# Patient Record
Sex: FEMALE | Race: WHITE | Employment: OTHER | URBAN - METROPOLITAN AREA
[De-identification: names, ages, dates, MRNs, and addresses within clinical notes are randomized per-mention and may not be internally consistent; named-entity substitution may affect disease eponyms.]

---

## 2017-02-12 ENCOUNTER — APPOINTMENT (OUTPATIENT)
Dept: CT IMAGING | Age: 82
DRG: 392 | End: 2017-02-12
Payer: COMMERCIAL

## 2017-02-12 ENCOUNTER — HOSPITAL ENCOUNTER (INPATIENT)
Age: 82
LOS: 1 days | Discharge: HOME HEALTH | DRG: 392 | End: 2017-02-16
Attending: EMERGENCY MEDICINE | Admitting: FAMILY MEDICINE
Payer: COMMERCIAL

## 2017-02-12 ENCOUNTER — APPOINTMENT (OUTPATIENT)
Dept: MRI IMAGING | Age: 82
DRG: 392 | End: 2017-02-12
Payer: COMMERCIAL

## 2017-02-12 DIAGNOSIS — G93.40 ENCEPHALOPATHY ACUTE: ICD-10-CM

## 2017-02-12 DIAGNOSIS — R44.1 HALLUCINATION, VISUAL: ICD-10-CM

## 2017-02-12 DIAGNOSIS — R10.84 GENERALIZED ABDOMINAL PAIN: Primary | ICD-10-CM

## 2017-02-12 LAB
-: NORMAL
ABSOLUTE EOS #: 0.1 K/UL (ref 0–0.4)
ABSOLUTE LYMPH #: 1.7 K/UL (ref 1–4.8)
ABSOLUTE MONO #: 0.6 K/UL (ref 0.1–1.3)
ALBUMIN SERPL-MCNC: 3.9 G/DL (ref 3.5–5.2)
ALBUMIN/GLOBULIN RATIO: ABNORMAL (ref 1–2.5)
ALP BLD-CCNC: 75 U/L (ref 35–104)
ALT SERPL-CCNC: 12 U/L (ref 5–33)
AMMONIA: 43 UMOL/L (ref 11–51)
AMORPHOUS: NORMAL
ANION GAP SERPL CALCULATED.3IONS-SCNC: 14 MMOL/L (ref 9–17)
AST SERPL-CCNC: 25 U/L
BACTERIA: NORMAL
BASOPHILS # BLD: 1 % (ref 0–2)
BASOPHILS ABSOLUTE: 0 K/UL (ref 0–0.2)
BILIRUB SERPL-MCNC: 0.56 MG/DL (ref 0.3–1.2)
BILIRUBIN URINE: NEGATIVE
BUN BLDV-MCNC: 12 MG/DL (ref 8–23)
BUN/CREAT BLD: ABNORMAL (ref 9–20)
CALCIUM SERPL-MCNC: 9.8 MG/DL (ref 8.6–10.4)
CASTS UA: NORMAL /LPF
CHLORIDE BLD-SCNC: 101 MMOL/L (ref 98–107)
CO2: 23 MMOL/L (ref 20–31)
COLOR: YELLOW
COMMENT UA: ABNORMAL
CREAT SERPL-MCNC: 0.7 MG/DL (ref 0.5–0.9)
CRYSTALS, UA: NORMAL /HPF
DIFFERENTIAL TYPE: ABNORMAL
EOSINOPHILS RELATIVE PERCENT: 2 % (ref 0–4)
EPITHELIAL CELLS UA: NORMAL /HPF
GFR AFRICAN AMERICAN: >60 ML/MIN
GFR NON-AFRICAN AMERICAN: >60 ML/MIN
GFR SERPL CREATININE-BSD FRML MDRD: ABNORMAL ML/MIN/{1.73_M2}
GFR SERPL CREATININE-BSD FRML MDRD: ABNORMAL ML/MIN/{1.73_M2}
GLUCOSE BLD-MCNC: 107 MG/DL (ref 70–99)
GLUCOSE URINE: NEGATIVE
HCT VFR BLD CALC: 42.7 % (ref 36–46)
HEMOGLOBIN: 14.6 G/DL (ref 12–16)
INR BLD: 2.4
KETONES, URINE: ABNORMAL
LACTIC ACID: 1.1 MMOL/L (ref 0.5–2.2)
LACTIC ACID: 1.3 MMOL/L (ref 0.5–2.2)
LEUKOCYTE ESTERASE, URINE: NEGATIVE
LIPASE: 15 U/L (ref 13–60)
LYMPHOCYTES # BLD: 34 % (ref 24–44)
MAGNESIUM: 2 MG/DL (ref 1.6–2.6)
MCH RBC QN AUTO: 31.4 PG (ref 26–34)
MCHC RBC AUTO-ENTMCNC: 34.2 G/DL (ref 31–37)
MCV RBC AUTO: 91.8 FL (ref 80–100)
MONOCYTES # BLD: 11 % (ref 1–7)
MUCUS: NORMAL
NITRITE, URINE: NEGATIVE
OTHER OBSERVATIONS UA: NORMAL
PDW BLD-RTO: 13 % (ref 11.5–14.9)
PH UA: 7 (ref 5–8)
PLATELET # BLD: 200 K/UL (ref 150–450)
PLATELET ESTIMATE: ABNORMAL
PMV BLD AUTO: 7.8 FL (ref 6–12)
POTASSIUM SERPL-SCNC: 4.3 MMOL/L (ref 3.7–5.3)
PROTEIN UA: ABNORMAL
PROTHROMBIN TIME: 26.1 SEC (ref 9.7–12)
RBC # BLD: 4.66 M/UL (ref 4–5.2)
RBC # BLD: ABNORMAL 10*6/UL
RBC UA: NORMAL /HPF
RENAL EPITHELIAL, UA: NORMAL /HPF
SEG NEUTROPHILS: 52 % (ref 36–66)
SEGMENTED NEUTROPHILS ABSOLUTE COUNT: 2.6 K/UL (ref 1.3–9.1)
SODIUM BLD-SCNC: 138 MMOL/L (ref 135–144)
SPECIFIC GRAVITY UA: 1.02 (ref 1–1.03)
TOTAL PROTEIN: 8.3 G/DL (ref 6.4–8.3)
TRICHOMONAS: NORMAL
TROPONIN INTERP: NORMAL
TROPONIN INTERP: NORMAL
TROPONIN T: <0.03 NG/ML
TROPONIN T: <0.03 NG/ML
TURBIDITY: CLEAR
URINE HGB: NEGATIVE
UROBILINOGEN, URINE: NORMAL
WBC # BLD: 5.1 K/UL (ref 3.5–11)
WBC # BLD: ABNORMAL 10*3/UL
WBC UA: NORMAL /HPF
YEAST: NORMAL

## 2017-02-12 PROCEDURE — 2500000003 HC RX 250 WO HCPCS: Performed by: EMERGENCY MEDICINE

## 2017-02-12 PROCEDURE — A9579 GAD-BASE MR CONTRAST NOS,1ML: HCPCS | Performed by: FAMILY MEDICINE

## 2017-02-12 PROCEDURE — 74177 CT ABD & PELVIS W/CONTRAST: CPT | Performed by: RADIOLOGY

## 2017-02-12 PROCEDURE — 2580000003 HC RX 258: Performed by: EMERGENCY MEDICINE

## 2017-02-12 PROCEDURE — 93005 ELECTROCARDIOGRAM TRACING: CPT

## 2017-02-12 PROCEDURE — 96372 THER/PROPH/DIAG INJ SC/IM: CPT

## 2017-02-12 PROCEDURE — 6360000002 HC RX W HCPCS: Performed by: FAMILY MEDICINE

## 2017-02-12 PROCEDURE — 85610 PROTHROMBIN TIME: CPT

## 2017-02-12 PROCEDURE — 83735 ASSAY OF MAGNESIUM: CPT

## 2017-02-12 PROCEDURE — 6360000004 HC RX CONTRAST MEDICATION: Performed by: EMERGENCY MEDICINE

## 2017-02-12 PROCEDURE — 96375 TX/PRO/DX INJ NEW DRUG ADDON: CPT

## 2017-02-12 PROCEDURE — 6360000002 HC RX W HCPCS: Performed by: EMERGENCY MEDICINE

## 2017-02-12 PROCEDURE — 6360000004 HC RX CONTRAST MEDICATION: Performed by: FAMILY MEDICINE

## 2017-02-12 PROCEDURE — 2580000003 HC RX 258: Performed by: FAMILY MEDICINE

## 2017-02-12 PROCEDURE — 74185 MRA ABD W OR W/O CNTRST: CPT | Performed by: RADIOLOGY

## 2017-02-12 PROCEDURE — S0028 INJECTION, FAMOTIDINE, 20 MG: HCPCS | Performed by: EMERGENCY MEDICINE

## 2017-02-12 PROCEDURE — G0378 HOSPITAL OBSERVATION PER HR: HCPCS

## 2017-02-12 PROCEDURE — 70450 CT HEAD/BRAIN W/O DYE: CPT | Performed by: RADIOLOGY

## 2017-02-12 PROCEDURE — 70450 CT HEAD/BRAIN W/O DYE: CPT

## 2017-02-12 PROCEDURE — 83605 ASSAY OF LACTIC ACID: CPT

## 2017-02-12 PROCEDURE — 74177 CT ABD & PELVIS W/CONTRAST: CPT

## 2017-02-12 PROCEDURE — 36415 COLL VENOUS BLD VENIPUNCTURE: CPT

## 2017-02-12 PROCEDURE — 80053 COMPREHEN METABOLIC PANEL: CPT

## 2017-02-12 PROCEDURE — 82140 ASSAY OF AMMONIA: CPT

## 2017-02-12 PROCEDURE — 84484 ASSAY OF TROPONIN QUANT: CPT

## 2017-02-12 PROCEDURE — 87086 URINE CULTURE/COLONY COUNT: CPT

## 2017-02-12 PROCEDURE — 96374 THER/PROPH/DIAG INJ IV PUSH: CPT

## 2017-02-12 PROCEDURE — 85025 COMPLETE CBC W/AUTO DIFF WBC: CPT

## 2017-02-12 PROCEDURE — C8900 MRA W/CONT, ABD: HCPCS

## 2017-02-12 PROCEDURE — 99285 EMERGENCY DEPT VISIT HI MDM: CPT

## 2017-02-12 PROCEDURE — 83690 ASSAY OF LIPASE: CPT

## 2017-02-12 PROCEDURE — 81001 URINALYSIS AUTO W/SCOPE: CPT

## 2017-02-12 PROCEDURE — 96376 TX/PRO/DX INJ SAME DRUG ADON: CPT

## 2017-02-12 RX ORDER — SODIUM CHLORIDE 0.9 % (FLUSH) 0.9 %
10 SYRINGE (ML) INJECTION EVERY 12 HOURS SCHEDULED
Status: DISCONTINUED | OUTPATIENT
Start: 2017-02-12 | End: 2017-02-16 | Stop reason: HOSPADM

## 2017-02-12 RX ORDER — ONDANSETRON 2 MG/ML
4 INJECTION INTRAMUSCULAR; INTRAVENOUS EVERY 4 HOURS PRN
Status: DISCONTINUED | OUTPATIENT
Start: 2017-02-12 | End: 2017-02-15

## 2017-02-12 RX ORDER — LORAZEPAM 2 MG/ML
0.5 INJECTION INTRAMUSCULAR ONCE
Status: COMPLETED | OUTPATIENT
Start: 2017-02-12 | End: 2017-02-12

## 2017-02-12 RX ORDER — 0.9 % SODIUM CHLORIDE 0.9 %
500 INTRAVENOUS SOLUTION INTRAVENOUS ONCE
Status: COMPLETED | OUTPATIENT
Start: 2017-02-12 | End: 2017-02-12

## 2017-02-12 RX ORDER — ONDANSETRON 2 MG/ML
8 INJECTION INTRAMUSCULAR; INTRAVENOUS ONCE
Status: COMPLETED | OUTPATIENT
Start: 2017-02-12 | End: 2017-02-12

## 2017-02-12 RX ORDER — DEXTROSE, SODIUM CHLORIDE, AND POTASSIUM CHLORIDE 5; .45; .15 G/100ML; G/100ML; G/100ML
INJECTION INTRAVENOUS CONTINUOUS
Status: DISCONTINUED | OUTPATIENT
Start: 2017-02-12 | End: 2017-02-16 | Stop reason: HOSPADM

## 2017-02-12 RX ORDER — HYDRALAZINE HYDROCHLORIDE 20 MG/ML
10 INJECTION INTRAMUSCULAR; INTRAVENOUS EVERY 6 HOURS PRN
Status: DISCONTINUED | OUTPATIENT
Start: 2017-02-12 | End: 2017-02-15

## 2017-02-12 RX ORDER — MORPHINE SULFATE 2 MG/ML
2 INJECTION, SOLUTION INTRAMUSCULAR; INTRAVENOUS
Status: DISCONTINUED | OUTPATIENT
Start: 2017-02-12 | End: 2017-02-15

## 2017-02-12 RX ORDER — SODIUM CHLORIDE 0.9 % (FLUSH) 0.9 %
10 SYRINGE (ML) INJECTION PRN
Status: DISCONTINUED | OUTPATIENT
Start: 2017-02-12 | End: 2017-02-15

## 2017-02-12 RX ORDER — ASCORBIC ACID 500 MG
500 TABLET ORAL DAILY
COMMUNITY
End: 2017-12-05 | Stop reason: SDUPTHER

## 2017-02-12 RX ORDER — LANOLIN ALCOHOL/MO/W.PET/CERES
325 CREAM (GRAM) TOPICAL
COMMUNITY
End: 2017-12-05 | Stop reason: SDUPTHER

## 2017-02-12 RX ORDER — LORAZEPAM 2 MG/ML
0.5 INJECTION INTRAMUSCULAR EVERY 8 HOURS PRN
Status: DISCONTINUED | OUTPATIENT
Start: 2017-02-12 | End: 2017-02-15

## 2017-02-12 RX ORDER — MORPHINE SULFATE 2 MG/ML
1 INJECTION, SOLUTION INTRAMUSCULAR; INTRAVENOUS
Status: DISCONTINUED | OUTPATIENT
Start: 2017-02-12 | End: 2017-02-15

## 2017-02-12 RX ADMIN — ONDANSETRON 8 MG: 2 INJECTION INTRAMUSCULAR; INTRAVENOUS at 15:33

## 2017-02-12 RX ADMIN — LORAZEPAM 0.5 MG: 2 INJECTION INTRAMUSCULAR; INTRAVENOUS at 17:56

## 2017-02-12 RX ADMIN — IOHEXOL 130 ML: 350 INJECTION, SOLUTION INTRAVENOUS at 16:26

## 2017-02-12 RX ADMIN — SODIUM CHLORIDE 500 ML: 9 INJECTION, SOLUTION INTRAVENOUS at 15:26

## 2017-02-12 RX ADMIN — GADOPENTETATE DIMEGLUMINE 40 ML: 469.01 INJECTION INTRAVENOUS at 21:12

## 2017-02-12 RX ADMIN — LORAZEPAM 0.5 MG: 2 INJECTION INTRAMUSCULAR; INTRAVENOUS at 21:02

## 2017-02-12 RX ADMIN — ENOXAPARIN SODIUM 40 MG: 40 INJECTION SUBCUTANEOUS at 21:11

## 2017-02-12 RX ADMIN — MORPHINE SULFATE 1 MG: 2 INJECTION, SOLUTION INTRAMUSCULAR; INTRAVENOUS at 21:02

## 2017-02-12 RX ADMIN — Medication 10 ML: at 21:04

## 2017-02-12 RX ADMIN — FAMOTIDINE 20 MG: 10 INJECTION, SOLUTION INTRAVENOUS at 15:36

## 2017-02-12 RX ADMIN — POTASSIUM CHLORIDE, DEXTROSE MONOHYDRATE AND SODIUM CHLORIDE: 150; 5; 450 INJECTION, SOLUTION INTRAVENOUS at 21:11

## 2017-02-12 ASSESSMENT — PAIN SCALES - GENERAL
PAINLEVEL_OUTOF10: 8
PAINLEVEL_OUTOF10: 10

## 2017-02-12 ASSESSMENT — ENCOUNTER SYMPTOMS
VOMITING: 0
BLOOD IN STOOL: 0
NAUSEA: 1
ANAL BLEEDING: 0
RECTAL PAIN: 0
ABDOMINAL PAIN: 1
ABDOMINAL DISTENTION: 0
CONSTIPATION: 1
DIARRHEA: 0

## 2017-02-12 ASSESSMENT — PAIN DESCRIPTION - LOCATION: LOCATION: ABDOMEN;HEAD;SHOULDER

## 2017-02-12 ASSESSMENT — PAIN DESCRIPTION - PAIN TYPE: TYPE: ACUTE PAIN

## 2017-02-13 PROBLEM — F03.90 DEMENTIA (HCC): Chronic | Status: ACTIVE | Noted: 2017-02-13

## 2017-02-13 PROBLEM — R10.84 GENERALIZED ABDOMINAL PAIN: Status: ACTIVE | Noted: 2017-02-13

## 2017-02-13 LAB
CULTURE: NORMAL
CULTURE: NORMAL
INR BLD: 1.9
Lab: NORMAL
PROTHROMBIN TIME: 20.7 SEC (ref 9.7–12)
SPECIMEN DESCRIPTION: NORMAL
SPECIMEN DESCRIPTION: NORMAL
STATUS: NORMAL

## 2017-02-13 PROCEDURE — 96376 TX/PRO/DX INJ SAME DRUG ADON: CPT

## 2017-02-13 PROCEDURE — 36415 COLL VENOUS BLD VENIPUNCTURE: CPT

## 2017-02-13 PROCEDURE — 2580000003 HC RX 258: Performed by: FAMILY MEDICINE

## 2017-02-13 PROCEDURE — 93880 EXTRACRANIAL BILAT STUDY: CPT

## 2017-02-13 PROCEDURE — 6370000000 HC RX 637 (ALT 250 FOR IP): Performed by: FAMILY MEDICINE

## 2017-02-13 PROCEDURE — 99223 1ST HOSP IP/OBS HIGH 75: CPT | Performed by: FAMILY MEDICINE

## 2017-02-13 PROCEDURE — 85610 PROTHROMBIN TIME: CPT

## 2017-02-13 PROCEDURE — 6360000002 HC RX W HCPCS: Performed by: FAMILY MEDICINE

## 2017-02-13 PROCEDURE — G0378 HOSPITAL OBSERVATION PER HR: HCPCS

## 2017-02-13 RX ORDER — DOXAZOSIN MESYLATE 1 MG/1
1 TABLET ORAL DAILY
Status: DISCONTINUED | OUTPATIENT
Start: 2017-02-13 | End: 2017-02-16 | Stop reason: HOSPADM

## 2017-02-13 RX ORDER — AMLODIPINE BESYLATE 5 MG/1
5 TABLET ORAL DAILY
Status: DISCONTINUED | OUTPATIENT
Start: 2017-02-13 | End: 2017-02-16 | Stop reason: HOSPADM

## 2017-02-13 RX ORDER — WARFARIN SODIUM 3 MG/1
3 TABLET ORAL
Status: DISCONTINUED | OUTPATIENT
Start: 2017-02-13 | End: 2017-02-16 | Stop reason: HOSPADM

## 2017-02-13 RX ORDER — ATENOLOL 50 MG/1
50 TABLET ORAL DAILY
Status: DISCONTINUED | OUTPATIENT
Start: 2017-02-13 | End: 2017-02-16 | Stop reason: HOSPADM

## 2017-02-13 RX ORDER — LEVOTHYROXINE SODIUM 0.1 MG/1
100 TABLET ORAL DAILY
Status: DISCONTINUED | OUTPATIENT
Start: 2017-02-13 | End: 2017-02-16 | Stop reason: HOSPADM

## 2017-02-13 RX ORDER — PANTOPRAZOLE SODIUM 40 MG/1
40 TABLET, DELAYED RELEASE ORAL
Status: DISCONTINUED | OUTPATIENT
Start: 2017-02-13 | End: 2017-02-16 | Stop reason: HOSPADM

## 2017-02-13 RX ORDER — TRAMADOL HYDROCHLORIDE 50 MG/1
50 TABLET ORAL EVERY 6 HOURS PRN
Status: DISCONTINUED | OUTPATIENT
Start: 2017-02-13 | End: 2017-02-16 | Stop reason: HOSPADM

## 2017-02-13 RX ORDER — FERROUS SULFATE 325(65) MG
325 TABLET ORAL
Status: DISCONTINUED | OUTPATIENT
Start: 2017-02-13 | End: 2017-02-16 | Stop reason: HOSPADM

## 2017-02-13 RX ORDER — LATANOPROST 50 UG/ML
1 SOLUTION/ DROPS OPHTHALMIC DAILY
Status: DISCONTINUED | OUTPATIENT
Start: 2017-02-13 | End: 2017-02-16 | Stop reason: HOSPADM

## 2017-02-13 RX ORDER — POTASSIUM CHLORIDE 750 MG/1
10 CAPSULE, EXTENDED RELEASE ORAL 2 TIMES DAILY
Status: DISCONTINUED | OUTPATIENT
Start: 2017-02-13 | End: 2017-02-16 | Stop reason: HOSPADM

## 2017-02-13 RX ORDER — WARFARIN SODIUM 3 MG/1
1.5 TABLET ORAL
Status: DISCONTINUED | OUTPATIENT
Start: 2017-02-14 | End: 2017-02-16 | Stop reason: HOSPADM

## 2017-02-13 RX ORDER — WARFARIN SODIUM 3 MG/1
1.5 TABLET ORAL DAILY
Status: DISCONTINUED | OUTPATIENT
Start: 2017-02-13 | End: 2017-02-13 | Stop reason: DRUGHIGH

## 2017-02-13 RX ORDER — DONEPEZIL HYDROCHLORIDE 5 MG/1
5 TABLET, FILM COATED ORAL NIGHTLY
Status: DISCONTINUED | OUTPATIENT
Start: 2017-02-13 | End: 2017-02-16 | Stop reason: HOSPADM

## 2017-02-13 RX ORDER — TIMOLOL MALEATE 5 MG/ML
1 SOLUTION/ DROPS OPHTHALMIC 2 TIMES DAILY
Status: DISCONTINUED | OUTPATIENT
Start: 2017-02-13 | End: 2017-02-16 | Stop reason: HOSPADM

## 2017-02-13 RX ADMIN — POTASSIUM CHLORIDE, DEXTROSE MONOHYDRATE AND SODIUM CHLORIDE: 150; 5; 450 INJECTION, SOLUTION INTRAVENOUS at 12:00

## 2017-02-13 RX ADMIN — POTASSIUM CHLORIDE 10 MEQ: 750 CAPSULE, EXTENDED RELEASE ORAL at 20:12

## 2017-02-13 RX ADMIN — LEVOTHYROXINE SODIUM 100 MCG: 100 TABLET ORAL at 11:56

## 2017-02-13 RX ADMIN — MORPHINE SULFATE 2 MG: 2 INJECTION, SOLUTION INTRAMUSCULAR; INTRAVENOUS at 16:47

## 2017-02-13 RX ADMIN — POTASSIUM CHLORIDE 10 MEQ: 750 CAPSULE, EXTENDED RELEASE ORAL at 09:00

## 2017-02-13 RX ADMIN — DOXAZOSIN 1 MG: 1 TABLET ORAL at 11:56

## 2017-02-13 RX ADMIN — TIMOLOL MALEATE 1 DROP: 5 SOLUTION OPHTHALMIC at 22:20

## 2017-02-13 RX ADMIN — LATANOPROST 1 DROP: 50 SOLUTION OPHTHALMIC at 11:56

## 2017-02-13 RX ADMIN — MORPHINE SULFATE 2 MG: 2 INJECTION, SOLUTION INTRAMUSCULAR; INTRAVENOUS at 01:26

## 2017-02-13 RX ADMIN — DONEPEZIL HYDROCHLORIDE 5 MG: 5 TABLET, FILM COATED ORAL at 20:12

## 2017-02-13 RX ADMIN — AMLODIPINE BESYLATE 5 MG: 5 TABLET ORAL at 09:01

## 2017-02-13 RX ADMIN — TIMOLOL MALEATE 1 DROP: 5 SOLUTION OPHTHALMIC at 11:56

## 2017-02-13 RX ADMIN — LATANOPROST 1 DROP: 50 SOLUTION OPHTHALMIC at 20:12

## 2017-02-13 RX ADMIN — MORPHINE SULFATE 2 MG: 2 INJECTION, SOLUTION INTRAMUSCULAR; INTRAVENOUS at 12:36

## 2017-02-13 RX ADMIN — ATENOLOL 50 MG: 50 TABLET ORAL at 09:00

## 2017-02-13 RX ADMIN — WARFARIN SODIUM 3 MG: 3 TABLET ORAL at 18:38

## 2017-02-13 ASSESSMENT — PAIN DESCRIPTION - DESCRIPTORS: DESCRIPTORS: DISCOMFORT;DULL

## 2017-02-13 ASSESSMENT — PAIN SCALES - GENERAL
PAINLEVEL_OUTOF10: 10
PAINLEVEL_OUTOF10: 6
PAINLEVEL_OUTOF10: 0
PAINLEVEL_OUTOF10: 8
PAINLEVEL_OUTOF10: 7

## 2017-02-13 ASSESSMENT — PAIN DESCRIPTION - PAIN TYPE: TYPE: ACUTE PAIN;CHRONIC PAIN

## 2017-02-13 ASSESSMENT — PAIN DESCRIPTION - LOCATION: LOCATION: ABDOMEN;NECK

## 2017-02-14 PROBLEM — F03.918 DEMENTIA WITH BEHAVIORAL DISTURBANCE: Chronic | Status: ACTIVE | Noted: 2017-02-13

## 2017-02-14 LAB
ANION GAP SERPL CALCULATED.3IONS-SCNC: 15 MMOL/L (ref 9–17)
BUN BLDV-MCNC: 8 MG/DL (ref 8–23)
BUN/CREAT BLD: ABNORMAL (ref 9–20)
CALCIUM SERPL-MCNC: 9.3 MG/DL (ref 8.6–10.4)
CHLORIDE BLD-SCNC: 102 MMOL/L (ref 98–107)
CO2: 21 MMOL/L (ref 20–31)
CREAT SERPL-MCNC: 0.86 MG/DL (ref 0.5–0.9)
GFR AFRICAN AMERICAN: >60 ML/MIN
GFR NON-AFRICAN AMERICAN: >60 ML/MIN
GFR SERPL CREATININE-BSD FRML MDRD: ABNORMAL ML/MIN/{1.73_M2}
GFR SERPL CREATININE-BSD FRML MDRD: ABNORMAL ML/MIN/{1.73_M2}
GLUCOSE BLD-MCNC: 131 MG/DL (ref 70–99)
INR BLD: 2.1
LV EF: 55 %
LVEF MODALITY: NORMAL
POTASSIUM SERPL-SCNC: 4.2 MMOL/L (ref 3.7–5.3)
PROTHROMBIN TIME: 23.1 SEC (ref 9.7–12)
SODIUM BLD-SCNC: 138 MMOL/L (ref 135–144)

## 2017-02-14 PROCEDURE — 99231 SBSQ HOSP IP/OBS SF/LOW 25: CPT | Performed by: FAMILY MEDICINE

## 2017-02-14 PROCEDURE — 2580000003 HC RX 258: Performed by: FAMILY MEDICINE

## 2017-02-14 PROCEDURE — 85610 PROTHROMBIN TIME: CPT

## 2017-02-14 PROCEDURE — 80048 BASIC METABOLIC PNL TOTAL CA: CPT

## 2017-02-14 PROCEDURE — G0378 HOSPITAL OBSERVATION PER HR: HCPCS

## 2017-02-14 PROCEDURE — 6370000000 HC RX 637 (ALT 250 FOR IP): Performed by: FAMILY MEDICINE

## 2017-02-14 PROCEDURE — 36415 COLL VENOUS BLD VENIPUNCTURE: CPT

## 2017-02-14 PROCEDURE — C8929 TTE W OR WO FOL WCON,DOPPLER: HCPCS

## 2017-02-14 PROCEDURE — 96375 TX/PRO/DX INJ NEW DRUG ADDON: CPT

## 2017-02-14 PROCEDURE — 96374 THER/PROPH/DIAG INJ IV PUSH: CPT

## 2017-02-14 RX ADMIN — AMLODIPINE BESYLATE 5 MG: 5 TABLET ORAL at 08:54

## 2017-02-14 RX ADMIN — DOXAZOSIN 1 MG: 1 TABLET ORAL at 08:55

## 2017-02-14 RX ADMIN — TRAMADOL HYDROCHLORIDE 50 MG: 50 TABLET, FILM COATED ORAL at 04:45

## 2017-02-14 RX ADMIN — DONEPEZIL HYDROCHLORIDE 5 MG: 5 TABLET, FILM COATED ORAL at 20:06

## 2017-02-14 RX ADMIN — LATANOPROST 1 DROP: 50 SOLUTION OPHTHALMIC at 08:55

## 2017-02-14 RX ADMIN — Medication 325 MG: at 08:54

## 2017-02-14 RX ADMIN — POTASSIUM CHLORIDE 10 MEQ: 750 CAPSULE, EXTENDED RELEASE ORAL at 20:06

## 2017-02-14 RX ADMIN — POTASSIUM CHLORIDE 10 MEQ: 750 CAPSULE, EXTENDED RELEASE ORAL at 08:54

## 2017-02-14 RX ADMIN — PANTOPRAZOLE SODIUM 40 MG: 40 TABLET, DELAYED RELEASE ORAL at 06:03

## 2017-02-14 RX ADMIN — WARFARIN SODIUM 1.5 MG: 3 TABLET ORAL at 18:51

## 2017-02-14 RX ADMIN — TIMOLOL MALEATE 1 DROP: 5 SOLUTION OPHTHALMIC at 20:06

## 2017-02-14 RX ADMIN — POTASSIUM CHLORIDE, DEXTROSE MONOHYDRATE AND SODIUM CHLORIDE: 150; 5; 450 INJECTION, SOLUTION INTRAVENOUS at 14:33

## 2017-02-14 RX ADMIN — TRAMADOL HYDROCHLORIDE 50 MG: 50 TABLET, FILM COATED ORAL at 10:46

## 2017-02-14 RX ADMIN — TIMOLOL MALEATE 1 DROP: 5 SOLUTION OPHTHALMIC at 08:55

## 2017-02-14 RX ADMIN — LEVOTHYROXINE SODIUM 100 MCG: 100 TABLET ORAL at 06:03

## 2017-02-14 ASSESSMENT — PAIN SCALES - GENERAL
PAINLEVEL_OUTOF10: 6
PAINLEVEL_OUTOF10: 8
PAINLEVEL_OUTOF10: 6
PAINLEVEL_OUTOF10: 1

## 2017-02-15 LAB
ANION GAP SERPL CALCULATED.3IONS-SCNC: 14 MMOL/L (ref 9–17)
BUN BLDV-MCNC: 8 MG/DL (ref 8–23)
BUN/CREAT BLD: ABNORMAL (ref 9–20)
CALCIUM SERPL-MCNC: 9.6 MG/DL (ref 8.6–10.4)
CHLORIDE BLD-SCNC: 104 MMOL/L (ref 98–107)
CO2: 23 MMOL/L (ref 20–31)
CREAT SERPL-MCNC: 0.85 MG/DL (ref 0.5–0.9)
GFR AFRICAN AMERICAN: >60 ML/MIN
GFR NON-AFRICAN AMERICAN: >60 ML/MIN
GFR SERPL CREATININE-BSD FRML MDRD: ABNORMAL ML/MIN/{1.73_M2}
GFR SERPL CREATININE-BSD FRML MDRD: ABNORMAL ML/MIN/{1.73_M2}
GLUCOSE BLD-MCNC: 121 MG/DL (ref 70–99)
INR BLD: 2.6
POTASSIUM SERPL-SCNC: 4.7 MMOL/L (ref 3.7–5.3)
PROTHROMBIN TIME: 28.3 SEC (ref 9.7–12)
SODIUM BLD-SCNC: 141 MMOL/L (ref 135–144)

## 2017-02-15 PROCEDURE — 1200000000 HC SEMI PRIVATE

## 2017-02-15 PROCEDURE — G0009 ADMIN PNEUMOCOCCAL VACCINE: HCPCS | Performed by: FAMILY MEDICINE

## 2017-02-15 PROCEDURE — 36415 COLL VENOUS BLD VENIPUNCTURE: CPT

## 2017-02-15 PROCEDURE — 6360000002 HC RX W HCPCS: Performed by: FAMILY MEDICINE

## 2017-02-15 PROCEDURE — 2580000003 HC RX 258: Performed by: FAMILY MEDICINE

## 2017-02-15 PROCEDURE — 6370000000 HC RX 637 (ALT 250 FOR IP): Performed by: FAMILY MEDICINE

## 2017-02-15 PROCEDURE — 90670 PCV13 VACCINE IM: CPT | Performed by: FAMILY MEDICINE

## 2017-02-15 PROCEDURE — 85610 PROTHROMBIN TIME: CPT

## 2017-02-15 PROCEDURE — 99231 SBSQ HOSP IP/OBS SF/LOW 25: CPT | Performed by: FAMILY MEDICINE

## 2017-02-15 PROCEDURE — 80048 BASIC METABOLIC PNL TOTAL CA: CPT

## 2017-02-15 PROCEDURE — 99222 1ST HOSP IP/OBS MODERATE 55: CPT | Performed by: INTERNAL MEDICINE

## 2017-02-15 RX ORDER — BENZONATATE 100 MG/1
100 CAPSULE ORAL 3 TIMES DAILY PRN
Status: DISCONTINUED | OUTPATIENT
Start: 2017-02-15 | End: 2017-02-16 | Stop reason: HOSPADM

## 2017-02-15 RX ORDER — LORAZEPAM 0.5 MG/1
0.5 TABLET ORAL ONCE
Status: DISCONTINUED | OUTPATIENT
Start: 2017-02-15 | End: 2017-02-16 | Stop reason: HOSPADM

## 2017-02-15 RX ORDER — ONDANSETRON 4 MG/1
4 TABLET, FILM COATED ORAL EVERY 8 HOURS PRN
Status: DISCONTINUED | OUTPATIENT
Start: 2017-02-15 | End: 2017-02-16 | Stop reason: HOSPADM

## 2017-02-15 RX ADMIN — BENZONATATE 100 MG: 100 CAPSULE ORAL at 17:37

## 2017-02-15 RX ADMIN — WARFARIN SODIUM 3 MG: 3 TABLET ORAL at 17:10

## 2017-02-15 RX ADMIN — PANTOPRAZOLE SODIUM 40 MG: 40 TABLET, DELAYED RELEASE ORAL at 05:50

## 2017-02-15 RX ADMIN — Medication 325 MG: at 08:29

## 2017-02-15 RX ADMIN — TIMOLOL MALEATE 1 DROP: 5 SOLUTION OPHTHALMIC at 08:31

## 2017-02-15 RX ADMIN — LATANOPROST 1 DROP: 50 SOLUTION OPHTHALMIC at 08:30

## 2017-02-15 RX ADMIN — ATENOLOL 50 MG: 50 TABLET ORAL at 08:29

## 2017-02-15 RX ADMIN — DOXAZOSIN 1 MG: 1 TABLET ORAL at 08:30

## 2017-02-15 RX ADMIN — TRAMADOL HYDROCHLORIDE 50 MG: 50 TABLET, FILM COATED ORAL at 19:49

## 2017-02-15 RX ADMIN — PNEUMOCOCCAL 13-VALENT CONJUGATE VACCINE 0.5 ML: 2.2; 2.2; 2.2; 2.2; 2.2; 4.4; 2.2; 2.2; 2.2; 2.2; 2.2; 2.2; 2.2 INJECTION, SUSPENSION INTRAMUSCULAR at 12:51

## 2017-02-15 RX ADMIN — LEVOTHYROXINE SODIUM 100 MCG: 100 TABLET ORAL at 05:49

## 2017-02-15 RX ADMIN — TIMOLOL MALEATE 1 DROP: 5 SOLUTION OPHTHALMIC at 21:41

## 2017-02-15 RX ADMIN — DONEPEZIL HYDROCHLORIDE 5 MG: 5 TABLET, FILM COATED ORAL at 21:41

## 2017-02-15 RX ADMIN — POTASSIUM CHLORIDE 10 MEQ: 750 CAPSULE, EXTENDED RELEASE ORAL at 21:41

## 2017-02-15 RX ADMIN — AMLODIPINE BESYLATE 5 MG: 5 TABLET ORAL at 08:29

## 2017-02-15 RX ADMIN — POTASSIUM CHLORIDE 10 MEQ: 750 CAPSULE, EXTENDED RELEASE ORAL at 08:29

## 2017-02-15 RX ADMIN — POTASSIUM CHLORIDE, DEXTROSE MONOHYDRATE AND SODIUM CHLORIDE: 150; 5; 450 INJECTION, SOLUTION INTRAVENOUS at 09:43

## 2017-02-15 ASSESSMENT — PAIN SCALES - GENERAL
PAINLEVEL_OUTOF10: 5
PAINLEVEL_OUTOF10: 3

## 2017-02-16 VITALS
HEART RATE: 57 BPM | TEMPERATURE: 97.3 F | WEIGHT: 141.31 LBS | RESPIRATION RATE: 16 BRPM | OXYGEN SATURATION: 94 % | DIASTOLIC BLOOD PRESSURE: 50 MMHG | HEIGHT: 59 IN | BODY MASS INDEX: 28.49 KG/M2 | SYSTOLIC BLOOD PRESSURE: 113 MMHG

## 2017-02-16 LAB
ANION GAP SERPL CALCULATED.3IONS-SCNC: 13 MMOL/L (ref 9–17)
BUN BLDV-MCNC: 9 MG/DL (ref 8–23)
BUN/CREAT BLD: ABNORMAL (ref 9–20)
CALCIUM SERPL-MCNC: 9.5 MG/DL (ref 8.6–10.4)
CHLORIDE BLD-SCNC: 103 MMOL/L (ref 98–107)
CO2: 24 MMOL/L (ref 20–31)
CREAT SERPL-MCNC: 0.9 MG/DL (ref 0.5–0.9)
GFR AFRICAN AMERICAN: >60 ML/MIN
GFR NON-AFRICAN AMERICAN: 60 ML/MIN
GFR SERPL CREATININE-BSD FRML MDRD: ABNORMAL ML/MIN/{1.73_M2}
GFR SERPL CREATININE-BSD FRML MDRD: ABNORMAL ML/MIN/{1.73_M2}
GLUCOSE BLD-MCNC: 97 MG/DL (ref 70–99)
INR BLD: 3
POTASSIUM SERPL-SCNC: 4.5 MMOL/L (ref 3.7–5.3)
PROTHROMBIN TIME: 33.3 SEC (ref 9.7–12)
SODIUM BLD-SCNC: 140 MMOL/L (ref 135–144)

## 2017-02-16 PROCEDURE — 99238 HOSP IP/OBS DSCHRG MGMT 30/<: CPT | Performed by: FAMILY MEDICINE

## 2017-02-16 PROCEDURE — 36415 COLL VENOUS BLD VENIPUNCTURE: CPT

## 2017-02-16 PROCEDURE — 85610 PROTHROMBIN TIME: CPT

## 2017-02-16 PROCEDURE — 99232 SBSQ HOSP IP/OBS MODERATE 35: CPT | Performed by: INTERNAL MEDICINE

## 2017-02-16 PROCEDURE — 80048 BASIC METABOLIC PNL TOTAL CA: CPT

## 2017-02-16 PROCEDURE — 6370000000 HC RX 637 (ALT 250 FOR IP): Performed by: FAMILY MEDICINE

## 2017-02-16 RX ORDER — MIRTAZAPINE 15 MG/1
15 TABLET, FILM COATED ORAL NIGHTLY
Qty: 30 TABLET | Refills: 11 | Status: SHIPPED | OUTPATIENT
Start: 2017-02-16 | End: 2017-12-05 | Stop reason: SDUPTHER

## 2017-02-16 RX ORDER — DONEPEZIL HYDROCHLORIDE 5 MG/1
5 TABLET, FILM COATED ORAL NIGHTLY
Qty: 30 TABLET | Refills: 1 | Status: SHIPPED | OUTPATIENT
Start: 2017-02-16 | End: 2017-04-12 | Stop reason: SDUPTHER

## 2017-02-16 RX ADMIN — DOXAZOSIN 1 MG: 1 TABLET ORAL at 08:28

## 2017-02-16 RX ADMIN — LATANOPROST 1 DROP: 50 SOLUTION OPHTHALMIC at 08:28

## 2017-02-16 RX ADMIN — TIMOLOL MALEATE 1 DROP: 5 SOLUTION OPHTHALMIC at 08:28

## 2017-02-16 RX ADMIN — PANTOPRAZOLE SODIUM 40 MG: 40 TABLET, DELAYED RELEASE ORAL at 06:26

## 2017-02-16 RX ADMIN — POTASSIUM CHLORIDE 10 MEQ: 750 CAPSULE, EXTENDED RELEASE ORAL at 08:28

## 2017-02-16 RX ADMIN — Medication 325 MG: at 08:28

## 2017-02-16 RX ADMIN — ATENOLOL 50 MG: 50 TABLET ORAL at 08:28

## 2017-02-16 RX ADMIN — TRAMADOL HYDROCHLORIDE 50 MG: 50 TABLET, FILM COATED ORAL at 13:04

## 2017-02-16 RX ADMIN — AMLODIPINE BESYLATE 5 MG: 5 TABLET ORAL at 08:28

## 2017-02-16 RX ADMIN — LEVOTHYROXINE SODIUM 100 MCG: 100 TABLET ORAL at 06:26

## 2017-02-16 ASSESSMENT — PAIN SCALES - GENERAL: PAINLEVEL_OUTOF10: 0

## 2017-02-17 LAB
EKG ATRIAL RATE: 59 BPM
EKG P AXIS: -2 DEGREES
EKG P-R INTERVAL: 156 MS
EKG Q-T INTERVAL: 390 MS
EKG QRS DURATION: 74 MS
EKG QTC CALCULATION (BAZETT): 386 MS
EKG R AXIS: -13 DEGREES
EKG T AXIS: 26 DEGREES
EKG VENTRICULAR RATE: 59 BPM

## 2017-02-24 ENCOUNTER — HOSPITAL ENCOUNTER (OUTPATIENT)
Dept: PHARMACY | Age: 82
Setting detail: THERAPIES SERIES
Discharge: HOME | End: 2017-02-24
Payer: COMMERCIAL

## 2017-02-24 DIAGNOSIS — I48.20 CHRONIC ATRIAL FIBRILLATION (HCC): ICD-10-CM

## 2017-02-24 LAB
INR BLD: 2.9
PROTIME: 34.7 SECONDS

## 2017-02-24 PROCEDURE — 85610 PROTHROMBIN TIME: CPT

## 2017-02-24 PROCEDURE — G0463 HOSPITAL OUTPT CLINIC VISIT: HCPCS

## 2017-03-10 ENCOUNTER — HOSPITAL ENCOUNTER (OUTPATIENT)
Dept: PHARMACY | Age: 82
Setting detail: THERAPIES SERIES
Discharge: HOME | End: 2017-03-10
Payer: COMMERCIAL

## 2017-03-10 DIAGNOSIS — I48.20 CHRONIC ATRIAL FIBRILLATION (HCC): ICD-10-CM

## 2017-03-10 LAB
INR BLD: 2.5
PROTIME: 30.3 SECONDS

## 2017-03-10 PROCEDURE — G0463 HOSPITAL OUTPT CLINIC VISIT: HCPCS

## 2017-03-10 PROCEDURE — 85610 PROTHROMBIN TIME: CPT

## 2017-04-07 ENCOUNTER — HOSPITAL ENCOUNTER (OUTPATIENT)
Dept: PHARMACY | Age: 82
Setting detail: THERAPIES SERIES
Discharge: HOME | End: 2017-04-07
Payer: COMMERCIAL

## 2017-04-07 DIAGNOSIS — I48.20 CHRONIC ATRIAL FIBRILLATION (HCC): ICD-10-CM

## 2017-04-07 LAB
INR BLD: 2.1
PROTIME: 25.7 SECONDS

## 2017-04-07 PROCEDURE — G0463 HOSPITAL OUTPT CLINIC VISIT: HCPCS

## 2017-04-07 PROCEDURE — 85610 PROTHROMBIN TIME: CPT

## 2017-05-05 ENCOUNTER — HOSPITAL ENCOUNTER (OUTPATIENT)
Dept: PHARMACY | Age: 82
Setting detail: THERAPIES SERIES
Discharge: HOME | End: 2017-05-05
Payer: COMMERCIAL

## 2017-05-05 DIAGNOSIS — I48.20 CHRONIC ATRIAL FIBRILLATION (HCC): ICD-10-CM

## 2017-05-05 LAB
INR BLD: 2
PROTIME: 23.6 SECONDS

## 2017-05-05 PROCEDURE — 85610 PROTHROMBIN TIME: CPT

## 2017-05-05 PROCEDURE — 99211 OFF/OP EST MAY X REQ PHY/QHP: CPT

## 2017-05-05 PROCEDURE — G0463 HOSPITAL OUTPT CLINIC VISIT: HCPCS

## 2017-06-02 ENCOUNTER — HOSPITAL ENCOUNTER (OUTPATIENT)
Dept: PHARMACY | Age: 82
Setting detail: THERAPIES SERIES
Discharge: HOME | End: 2017-06-02
Payer: COMMERCIAL

## 2017-06-02 LAB
INR BLD: 2.1
PROTIME: 25.2 SECONDS

## 2017-06-02 PROCEDURE — 99211 OFF/OP EST MAY X REQ PHY/QHP: CPT

## 2017-06-02 PROCEDURE — 85610 PROTHROMBIN TIME: CPT

## 2017-06-02 PROCEDURE — G0463 HOSPITAL OUTPT CLINIC VISIT: HCPCS

## 2017-06-30 ENCOUNTER — HOSPITAL ENCOUNTER (OUTPATIENT)
Dept: PHARMACY | Age: 82
Setting detail: THERAPIES SERIES
Discharge: HOME | End: 2017-06-30
Payer: COMMERCIAL

## 2017-06-30 DIAGNOSIS — I48.20 CHRONIC ATRIAL FIBRILLATION (HCC): ICD-10-CM

## 2017-06-30 LAB
INR BLD: 2.2
PROTIME: 27 SECONDS

## 2017-06-30 PROCEDURE — 85610 PROTHROMBIN TIME: CPT

## 2017-06-30 PROCEDURE — G0463 HOSPITAL OUTPT CLINIC VISIT: HCPCS

## 2017-06-30 PROCEDURE — 99211 OFF/OP EST MAY X REQ PHY/QHP: CPT

## 2017-07-28 ENCOUNTER — HOSPITAL ENCOUNTER (OUTPATIENT)
Dept: PHARMACY | Age: 82
Setting detail: THERAPIES SERIES
Discharge: HOME | End: 2017-07-28
Payer: COMMERCIAL

## 2017-07-28 DIAGNOSIS — I48.20 CHRONIC ATRIAL FIBRILLATION (HCC): ICD-10-CM

## 2017-07-28 LAB
INR BLD: 2.4
PROTIME: 29.3 SECONDS

## 2017-07-28 PROCEDURE — G0463 HOSPITAL OUTPT CLINIC VISIT: HCPCS

## 2017-07-28 PROCEDURE — 85610 PROTHROMBIN TIME: CPT

## 2017-07-28 PROCEDURE — 99211 OFF/OP EST MAY X REQ PHY/QHP: CPT

## 2017-08-25 ENCOUNTER — HOSPITAL ENCOUNTER (OUTPATIENT)
Dept: PHARMACY | Age: 82
Setting detail: THERAPIES SERIES
Discharge: HOME | End: 2017-08-25
Payer: COMMERCIAL

## 2017-08-25 DIAGNOSIS — I48.20 CHRONIC ATRIAL FIBRILLATION (HCC): ICD-10-CM

## 2017-08-25 LAB
INR BLD: 1.9
PROTIME: 23.2 SECONDS

## 2017-08-25 PROCEDURE — 85610 PROTHROMBIN TIME: CPT

## 2017-08-25 PROCEDURE — 99211 OFF/OP EST MAY X REQ PHY/QHP: CPT

## 2017-09-08 ENCOUNTER — HOSPITAL ENCOUNTER (OUTPATIENT)
Dept: PHARMACY | Age: 82
Setting detail: THERAPIES SERIES
Discharge: HOME | End: 2017-09-08
Payer: COMMERCIAL

## 2017-09-08 DIAGNOSIS — I48.20 CHRONIC ATRIAL FIBRILLATION (HCC): ICD-10-CM

## 2017-09-08 LAB
INR BLD: 2.6
PROTIME: 31.4 SECONDS

## 2017-09-08 PROCEDURE — 85610 PROTHROMBIN TIME: CPT

## 2017-09-08 PROCEDURE — 99211 OFF/OP EST MAY X REQ PHY/QHP: CPT

## 2017-09-29 ENCOUNTER — TELEPHONE (OUTPATIENT)
Dept: PHARMACY | Age: 82
End: 2017-09-29

## 2017-10-06 ENCOUNTER — HOSPITAL ENCOUNTER (OUTPATIENT)
Dept: PHARMACY | Age: 82
Setting detail: THERAPIES SERIES
Discharge: HOME | End: 2017-10-06
Payer: COMMERCIAL

## 2017-10-06 DIAGNOSIS — I48.20 CHRONIC ATRIAL FIBRILLATION (HCC): ICD-10-CM

## 2017-10-13 ENCOUNTER — HOSPITAL ENCOUNTER (OUTPATIENT)
Dept: PHARMACY | Age: 82
Setting detail: THERAPIES SERIES
Discharge: HOME | End: 2017-10-13
Payer: COMMERCIAL

## 2017-10-13 DIAGNOSIS — I48.20 CHRONIC ATRIAL FIBRILLATION (HCC): ICD-10-CM

## 2017-10-13 LAB
INR BLD: 2
PROTIME: 23.5 SECONDS

## 2017-10-13 PROCEDURE — 85610 PROTHROMBIN TIME: CPT

## 2017-10-13 PROCEDURE — 99211 OFF/OP EST MAY X REQ PHY/QHP: CPT

## 2017-10-13 NOTE — PROGRESS NOTES
Patient states compliant with regimen. No bleeding or thromboembolic side effects noted. No significant med or dietary changes. No significant recent illness or disease state changes. PT/INR done in office per protocol. INR today is 2. Patient understands dosing directions and information discussed. Dosing card given to patient. Progress note faxed to office. INR therapeutic at 2.  Goal 2-3  Continue warfarin 3 mg MWF and 1.5 mg 4x weekly  Recheck INR in four weeks

## 2017-11-10 ENCOUNTER — APPOINTMENT (OUTPATIENT)
Dept: PHARMACY | Age: 82
End: 2017-11-10
Payer: COMMERCIAL

## 2017-11-17 ENCOUNTER — HOSPITAL ENCOUNTER (OUTPATIENT)
Dept: PHARMACY | Age: 82
Setting detail: THERAPIES SERIES
Discharge: HOME | End: 2017-11-17
Payer: COMMERCIAL

## 2017-11-17 DIAGNOSIS — I48.20 CHRONIC ATRIAL FIBRILLATION (HCC): ICD-10-CM

## 2017-11-17 LAB
INR BLD: 1.5
PROTIME: 18 SECONDS

## 2017-11-17 PROCEDURE — 99211 OFF/OP EST MAY X REQ PHY/QHP: CPT

## 2017-11-17 PROCEDURE — 85610 PROTHROMBIN TIME: CPT

## 2017-12-01 ENCOUNTER — HOSPITAL ENCOUNTER (OUTPATIENT)
Dept: PHARMACY | Age: 82
Setting detail: THERAPIES SERIES
Discharge: HOME | End: 2017-12-01
Payer: COMMERCIAL

## 2017-12-01 DIAGNOSIS — I48.20 CHRONIC ATRIAL FIBRILLATION (HCC): ICD-10-CM

## 2017-12-01 LAB
INR BLD: 1.7
PROTIME: 20.7 SECONDS

## 2017-12-01 PROCEDURE — 85610 PROTHROMBIN TIME: CPT

## 2017-12-01 PROCEDURE — 99211 OFF/OP EST MAY X REQ PHY/QHP: CPT

## 2017-12-15 ENCOUNTER — HOSPITAL ENCOUNTER (OUTPATIENT)
Dept: PHARMACY | Age: 82
Setting detail: THERAPIES SERIES
Discharge: HOME | End: 2017-12-15
Payer: COMMERCIAL

## 2017-12-15 DIAGNOSIS — I48.20 CHRONIC ATRIAL FIBRILLATION (HCC): ICD-10-CM

## 2017-12-15 LAB
INR BLD: 2.2
PROTIME: 26.8 SECONDS

## 2017-12-15 PROCEDURE — 99211 OFF/OP EST MAY X REQ PHY/QHP: CPT

## 2017-12-15 PROCEDURE — 85610 PROTHROMBIN TIME: CPT

## 2018-01-12 ENCOUNTER — HOSPITAL ENCOUNTER (OUTPATIENT)
Dept: PHARMACY | Age: 83
Discharge: HOME | End: 2018-01-12

## 2018-01-19 ENCOUNTER — HOSPITAL ENCOUNTER (OUTPATIENT)
Dept: PHARMACY | Age: 83
Setting detail: THERAPIES SERIES
Discharge: HOME | End: 2018-01-19
Payer: COMMERCIAL

## 2018-01-19 DIAGNOSIS — I48.20 CHRONIC ATRIAL FIBRILLATION (HCC): ICD-10-CM

## 2018-01-19 LAB
INR BLD: 1.7
PROTIME: 20.9 SECONDS

## 2018-01-19 PROCEDURE — 85610 PROTHROMBIN TIME: CPT

## 2018-01-19 PROCEDURE — 99211 OFF/OP EST MAY X REQ PHY/QHP: CPT

## 2018-02-02 ENCOUNTER — HOSPITAL ENCOUNTER (OUTPATIENT)
Dept: PHARMACY | Age: 83
Setting detail: THERAPIES SERIES
Discharge: HOME | End: 2018-02-02
Payer: COMMERCIAL

## 2018-02-02 LAB
INR BLD: 1.7
PROTIME: 20.1 SECONDS

## 2018-02-02 PROCEDURE — 99211 OFF/OP EST MAY X REQ PHY/QHP: CPT

## 2018-02-02 PROCEDURE — 85610 PROTHROMBIN TIME: CPT

## 2018-02-16 ENCOUNTER — HOSPITAL ENCOUNTER (OUTPATIENT)
Dept: PHARMACY | Age: 83
Setting detail: THERAPIES SERIES
Discharge: HOME | End: 2018-02-16
Payer: COMMERCIAL

## 2018-02-16 DIAGNOSIS — I48.20 CHRONIC ATRIAL FIBRILLATION (HCC): ICD-10-CM

## 2018-02-16 LAB
INR BLD: 2.6
PROTIME: 31.5 SECONDS

## 2018-02-16 PROCEDURE — 99211 OFF/OP EST MAY X REQ PHY/QHP: CPT

## 2018-02-16 PROCEDURE — 85610 PROTHROMBIN TIME: CPT

## 2018-03-16 ENCOUNTER — HOSPITAL ENCOUNTER (OUTPATIENT)
Dept: PHARMACY | Age: 83
Setting detail: THERAPIES SERIES
Discharge: HOME | End: 2018-03-16
Payer: COMMERCIAL

## 2018-03-16 DIAGNOSIS — I48.20 CHRONIC ATRIAL FIBRILLATION (HCC): ICD-10-CM

## 2018-03-16 LAB
INR BLD: 1.6
PROTIME: 19 SECONDS

## 2018-03-16 PROCEDURE — 99211 OFF/OP EST MAY X REQ PHY/QHP: CPT

## 2018-03-16 PROCEDURE — 85610 PROTHROMBIN TIME: CPT

## 2018-04-02 ENCOUNTER — HOSPITAL ENCOUNTER (OUTPATIENT)
Dept: PHARMACY | Age: 83
Setting detail: THERAPIES SERIES
Discharge: HOME | End: 2018-04-02
Payer: COMMERCIAL

## 2018-04-02 ENCOUNTER — TELEPHONE (OUTPATIENT)
Dept: PHARMACY | Age: 83
End: 2018-04-02

## 2018-04-06 ENCOUNTER — HOSPITAL ENCOUNTER (OUTPATIENT)
Dept: PHARMACY | Age: 83
Setting detail: THERAPIES SERIES
Discharge: HOME | End: 2018-04-06
Payer: COMMERCIAL

## 2018-04-06 DIAGNOSIS — I48.20 CHRONIC ATRIAL FIBRILLATION (HCC): ICD-10-CM

## 2018-04-06 LAB
INR BLD: 2
PROTIME: 23.6 SECONDS

## 2018-04-06 PROCEDURE — 85610 PROTHROMBIN TIME: CPT

## 2018-04-06 PROCEDURE — 99211 OFF/OP EST MAY X REQ PHY/QHP: CPT

## 2018-05-04 ENCOUNTER — HOSPITAL ENCOUNTER (OUTPATIENT)
Dept: PHARMACY | Age: 83
Setting detail: THERAPIES SERIES
Discharge: HOME | End: 2018-05-04
Payer: COMMERCIAL

## 2018-05-04 DIAGNOSIS — I48.20 CHRONIC ATRIAL FIBRILLATION (HCC): ICD-10-CM

## 2018-05-04 LAB
INR BLD: 1.8
PROTIME: 21.1 SECONDS

## 2018-05-04 PROCEDURE — 85610 PROTHROMBIN TIME: CPT

## 2018-05-04 PROCEDURE — 99211 OFF/OP EST MAY X REQ PHY/QHP: CPT

## 2018-05-18 ENCOUNTER — HOSPITAL ENCOUNTER (OUTPATIENT)
Dept: PHARMACY | Age: 83
Setting detail: THERAPIES SERIES
Discharge: HOME | End: 2018-05-18
Payer: COMMERCIAL

## 2018-05-18 LAB
INR BLD: 2
PROTIME: 24 SECONDS

## 2018-05-18 PROCEDURE — 99211 OFF/OP EST MAY X REQ PHY/QHP: CPT

## 2018-05-18 PROCEDURE — 85610 PROTHROMBIN TIME: CPT

## 2018-06-15 ENCOUNTER — TELEPHONE (OUTPATIENT)
Dept: PHARMACY | Age: 83
End: 2018-06-15

## 2018-06-22 ENCOUNTER — HOSPITAL ENCOUNTER (OUTPATIENT)
Dept: PHARMACY | Age: 83
Setting detail: THERAPIES SERIES
Discharge: HOME | End: 2018-06-22
Payer: COMMERCIAL

## 2018-06-22 DIAGNOSIS — I48.20 CHRONIC ATRIAL FIBRILLATION (HCC): ICD-10-CM

## 2018-06-22 LAB
INR BLD: 2
PROTIME: 24.5 SECONDS

## 2018-06-22 PROCEDURE — 99211 OFF/OP EST MAY X REQ PHY/QHP: CPT

## 2018-06-22 PROCEDURE — 85610 PROTHROMBIN TIME: CPT

## 2018-08-03 ENCOUNTER — HOSPITAL ENCOUNTER (OUTPATIENT)
Dept: PHARMACY | Age: 83
Setting detail: THERAPIES SERIES
Discharge: HOME | End: 2018-08-03
Payer: COMMERCIAL

## 2018-08-03 DIAGNOSIS — I48.20 CHRONIC ATRIAL FIBRILLATION (HCC): ICD-10-CM

## 2018-08-03 LAB
INR BLD: 2.4
PROTIME: 28.6 SECONDS

## 2018-08-03 PROCEDURE — 85610 PROTHROMBIN TIME: CPT

## 2018-08-03 PROCEDURE — 99211 OFF/OP EST MAY X REQ PHY/QHP: CPT

## 2018-08-31 ENCOUNTER — HOSPITAL ENCOUNTER (OUTPATIENT)
Dept: PHARMACY | Age: 83
Setting detail: THERAPIES SERIES
Discharge: HOME | End: 2018-08-31
Payer: COMMERCIAL

## 2018-08-31 DIAGNOSIS — I48.20 CHRONIC ATRIAL FIBRILLATION (HCC): ICD-10-CM

## 2018-08-31 LAB
INR BLD: 2.4
PROTIME: 28.2 SECONDS

## 2018-08-31 PROCEDURE — 85610 PROTHROMBIN TIME: CPT

## 2018-08-31 PROCEDURE — 99211 OFF/OP EST MAY X REQ PHY/QHP: CPT

## 2018-09-28 ENCOUNTER — HOSPITAL ENCOUNTER (OUTPATIENT)
Dept: PHARMACY | Age: 83
Setting detail: THERAPIES SERIES
Discharge: HOME | End: 2018-09-28
Payer: COMMERCIAL

## 2018-09-28 DIAGNOSIS — I48.20 CHRONIC ATRIAL FIBRILLATION (HCC): ICD-10-CM

## 2018-09-28 LAB
INR BLD: 2.1
PROTIME: 25.8 SECONDS

## 2018-09-28 PROCEDURE — 85610 PROTHROMBIN TIME: CPT

## 2018-09-28 PROCEDURE — 99211 OFF/OP EST MAY X REQ PHY/QHP: CPT

## 2018-09-28 NOTE — PROGRESS NOTES
Patient states compliant all of the time with regimen. Medication list reviewed. No changes. No bleeding or thromboembolic side effects noted. Diet, use of Vitamin K reviewed. No significant changes. No significant recent illness or disease state changes. No upcomming surgeries or procedures. New prescription for warfarin needed? No    PT/INR done in office per protocol. INR was therapeutic. INR = 2.1, goal range 2-3    Warfarin regimen will be continued at current dose 1.5 mg Tuesday and 3 mg all other days. Will retest in 4 weeks. Patient understands dosing directions and information discussed. Dosing schedule and follow up appointment given to patient. Progress note routed to referring physicians office.

## 2018-10-26 ENCOUNTER — HOSPITAL ENCOUNTER (OUTPATIENT)
Dept: PHARMACY | Age: 83
Setting detail: THERAPIES SERIES
Discharge: HOME | End: 2018-10-26
Payer: COMMERCIAL

## 2018-10-26 LAB
INR BLD: 2.4
PROTIME: 29.3 SECONDS

## 2018-10-26 PROCEDURE — 85610 PROTHROMBIN TIME: CPT

## 2018-10-26 PROCEDURE — 99211 OFF/OP EST MAY X REQ PHY/QHP: CPT

## 2018-10-26 NOTE — PROGRESS NOTES
Patient states compliant all of the time with regimen. No bleeding or thromboembolic side effects noted. No significant med or dietary changes. No significant recent illness or disease state changes. PT/INR done in office per protocol. INR was therapeutic at 2.4 (goal 2-3). Warfarin regimen will be continued at current dose 1.5 mg Tues and 3 mg all other days. Will retest in 4 weeks. Patient understands dosing directions and information discussed. Dosing schedule and follow up appointment given to patient. Progress note routed to referring physicians office.

## 2018-11-23 ENCOUNTER — HOSPITAL ENCOUNTER (OUTPATIENT)
Dept: PHARMACY | Age: 83
Setting detail: THERAPIES SERIES
End: 2018-11-23
Payer: COMMERCIAL

## 2018-11-23 DIAGNOSIS — I48.20 CHRONIC ATRIAL FIBRILLATION (HCC): ICD-10-CM

## 2018-11-30 ENCOUNTER — HOSPITAL ENCOUNTER (OUTPATIENT)
Dept: PHARMACY | Age: 83
Setting detail: THERAPIES SERIES
Discharge: HOME | End: 2018-11-30
Payer: COMMERCIAL

## 2018-11-30 DIAGNOSIS — I48.20 CHRONIC ATRIAL FIBRILLATION (HCC): ICD-10-CM

## 2018-11-30 LAB
INR BLD: 1.9
PROTIME: 23.2 SECONDS

## 2018-11-30 PROCEDURE — 85610 PROTHROMBIN TIME: CPT

## 2018-11-30 PROCEDURE — 99211 OFF/OP EST MAY X REQ PHY/QHP: CPT

## 2018-12-14 ENCOUNTER — HOSPITAL ENCOUNTER (OUTPATIENT)
Dept: PHARMACY | Age: 83
Setting detail: THERAPIES SERIES
Discharge: HOME | End: 2018-12-14
Payer: COMMERCIAL

## 2018-12-14 DIAGNOSIS — I48.20 CHRONIC ATRIAL FIBRILLATION (HCC): ICD-10-CM

## 2018-12-14 LAB
INR BLD: 2.5
PROTIME: 29.7 SECONDS

## 2018-12-14 PROCEDURE — 85610 PROTHROMBIN TIME: CPT

## 2018-12-14 PROCEDURE — 99211 OFF/OP EST MAY X REQ PHY/QHP: CPT

## 2019-01-11 ENCOUNTER — HOSPITAL ENCOUNTER (OUTPATIENT)
Dept: PHARMACY | Age: 84
Setting detail: THERAPIES SERIES
End: 2019-01-11
Payer: COMMERCIAL

## 2019-01-11 DIAGNOSIS — I48.20 CHRONIC ATRIAL FIBRILLATION (HCC): ICD-10-CM

## 2019-01-18 ENCOUNTER — HOSPITAL ENCOUNTER (OUTPATIENT)
Dept: PHARMACY | Age: 84
Setting detail: THERAPIES SERIES
Discharge: HOME | End: 2019-01-18
Payer: COMMERCIAL

## 2019-01-18 DIAGNOSIS — I48.20 CHRONIC ATRIAL FIBRILLATION (HCC): ICD-10-CM

## 2019-01-18 LAB
INR BLD: 2.6
PROTIME: 31.6 SECONDS

## 2019-01-18 PROCEDURE — 99211 OFF/OP EST MAY X REQ PHY/QHP: CPT

## 2019-01-18 PROCEDURE — 85610 PROTHROMBIN TIME: CPT

## 2019-02-15 ENCOUNTER — HOSPITAL ENCOUNTER (OUTPATIENT)
Dept: PHARMACY | Age: 84
Setting detail: THERAPIES SERIES
Discharge: HOME | End: 2019-02-15
Payer: COMMERCIAL

## 2019-02-15 LAB
INR BLD: 2.3
PROTIME: 27.1 SECONDS

## 2019-02-15 PROCEDURE — 85610 PROTHROMBIN TIME: CPT

## 2019-02-15 PROCEDURE — 99211 OFF/OP EST MAY X REQ PHY/QHP: CPT

## 2019-03-15 ENCOUNTER — HOSPITAL ENCOUNTER (OUTPATIENT)
Dept: PHARMACY | Age: 84
Setting detail: THERAPIES SERIES
Discharge: HOME | End: 2019-03-15
Payer: COMMERCIAL

## 2019-03-15 DIAGNOSIS — I48.20 CHRONIC ATRIAL FIBRILLATION (HCC): ICD-10-CM

## 2019-03-15 LAB
INR BLD: 2.2
PROTIME: 26.3 SECONDS

## 2019-04-12 ENCOUNTER — HOSPITAL ENCOUNTER (OUTPATIENT)
Dept: PHARMACY | Age: 84
Setting detail: THERAPIES SERIES
Discharge: HOME | End: 2019-04-12
Payer: COMMERCIAL

## 2019-04-12 DIAGNOSIS — I48.20 CHRONIC ATRIAL FIBRILLATION (HCC): ICD-10-CM

## 2019-04-12 LAB
INR BLD: 2.5
PROTIME: 30.5 SECONDS

## 2019-04-12 PROCEDURE — 85610 PROTHROMBIN TIME: CPT

## 2019-04-12 PROCEDURE — 99211 OFF/OP EST MAY X REQ PHY/QHP: CPT

## 2019-04-12 NOTE — PROGRESS NOTES
Patient states compliant with regimen. No bleeding or thromboembolic side effects noted. No significant med or dietary changes. No significant recent illness or disease state changes. PT/INR done in office per protocol. INR today is 2.5. Patient understands dosing directions and information discussed. Dosing card given to patient. Progress note faxed to office. INR therapeutic at 2.5.  Goal 2-3  Continue warfarin 1.5 mg Tues and 3 mg 6x weekly  Recheck INR in four weeks

## 2019-05-13 ENCOUNTER — APPOINTMENT (OUTPATIENT)
Dept: PHARMACY | Age: 84
End: 2019-05-13
Payer: COMMERCIAL

## 2019-05-17 ENCOUNTER — HOSPITAL ENCOUNTER (OUTPATIENT)
Dept: PHARMACY | Age: 84
Setting detail: THERAPIES SERIES
Discharge: HOME | End: 2019-05-17
Payer: COMMERCIAL

## 2019-05-17 DIAGNOSIS — I48.20 CHRONIC ATRIAL FIBRILLATION (HCC): ICD-10-CM

## 2019-05-17 LAB
INR BLD: 2.3
PROTIME: 27.4 SECONDS

## 2019-05-17 PROCEDURE — 99211 OFF/OP EST MAY X REQ PHY/QHP: CPT

## 2019-05-17 PROCEDURE — 85610 PROTHROMBIN TIME: CPT

## 2019-05-17 NOTE — PROGRESS NOTES
Patient states compliant all of the time with regimen. Medication list reviewed. No changes. No bleeding or thromboembolic side effects noted. Diet, use of Vitamin K reviewed. No significant changes. No significant recent illness or disease state changes. No upcomming surgeries or procedures. New prescription for warfarin needed? No    PT/INR done in office per protocol. INR was therapeutic. INR = 2., goal range 2-3    Warfarin regimen will be continued at current dose 1.5 mg Tues and 3 mg all other days. Will retest in 4 weeks. Patient understands dosing directions and information discussed. Dosing schedule and follow up appointment given to patient. Progress note routed to referring physicians office. 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

## 2019-05-30 ENCOUNTER — TELEPHONE (OUTPATIENT)
Dept: PHARMACY | Age: 84
End: 2019-05-30

## 2019-05-30 RX ORDER — WARFARIN SODIUM 3 MG/1
TABLET ORAL
Qty: 30 TABLET | Refills: 0 | Status: SHIPPED | OUTPATIENT
Start: 2019-05-30 | End: 2019-08-09 | Stop reason: SDUPTHER

## 2019-06-14 ENCOUNTER — HOSPITAL ENCOUNTER (OUTPATIENT)
Dept: PHARMACY | Age: 84
Setting detail: THERAPIES SERIES
Discharge: HOME | End: 2019-06-14
Payer: COMMERCIAL

## 2019-06-14 DIAGNOSIS — I48.20 CHRONIC ATRIAL FIBRILLATION (HCC): ICD-10-CM

## 2019-06-14 LAB
INR BLD: 2.2
PROTIME: 26.5 SECONDS

## 2019-06-14 PROCEDURE — 99211 OFF/OP EST MAY X REQ PHY/QHP: CPT

## 2019-06-14 PROCEDURE — 85610 PROTHROMBIN TIME: CPT

## 2019-06-14 NOTE — PROGRESS NOTES
Patient states compliant with regimen. No bleeding or thromboembolic side effects noted. No significant med or dietary changes. No significant recent illness or disease state changes. PT/INR done in office per protocol. INR today is 2.2. Patient understands dosing directions and information discussed. Dosing card given to patient. Progress note faxed to office. INR therapeutic at 2.2.  Goal 2-3  Continue warfarin 1.5 mg Tues and 3 mg 6x weekly  Recheck INR in four weeks  The [employer] has been increased to twice daily

## 2019-07-12 ENCOUNTER — HOSPITAL ENCOUNTER (OUTPATIENT)
Dept: PHARMACY | Age: 84
Setting detail: THERAPIES SERIES
Discharge: HOME | End: 2019-07-12
Payer: COMMERCIAL

## 2019-07-12 DIAGNOSIS — I48.20 CHRONIC ATRIAL FIBRILLATION (HCC): ICD-10-CM

## 2019-07-12 LAB
INR BLD: 2.1
PROTIME: 25.6 SECONDS

## 2019-07-12 PROCEDURE — 85610 PROTHROMBIN TIME: CPT

## 2019-07-12 PROCEDURE — 99211 OFF/OP EST MAY X REQ PHY/QHP: CPT

## 2019-07-12 NOTE — PROGRESS NOTES
Patient states compliant all of the time with regimen. Medication list reviewed. No changes. No bleeding or thromboembolic side effects noted. Diet, use of Vitamin K reviewed. No significant changes. No significant recent illness or disease state changes. No upcomming surgeries or procedures. New prescription for warfarin needed? No    PT/INR done in office per protocol. INR was therapeutic. INR = 2.1, goal range 2-3    Warfarin regimen will be continued at current dose 1.5 mg Tuesday and 3 mg all other days. Will retest in 4 weeks. Patient understands dosing directions and information discussed. Dosing schedule and follow up appointment given to patient. Progress note routed to referring physicians office. 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

## 2019-08-09 ENCOUNTER — HOSPITAL ENCOUNTER (OUTPATIENT)
Dept: PHARMACY | Age: 84
Setting detail: THERAPIES SERIES
Discharge: HOME | End: 2019-08-09
Payer: COMMERCIAL

## 2019-08-09 ENCOUNTER — HOSPITAL ENCOUNTER (OUTPATIENT)
Age: 84
Discharge: HOME | End: 2019-08-09
Payer: COMMERCIAL

## 2019-08-09 DIAGNOSIS — E03.9 HYPOTHYROIDISM, UNSPECIFIED TYPE: ICD-10-CM

## 2019-08-09 DIAGNOSIS — I10 HYPERTENSION, BENIGN ESSENTIAL, GOAL BELOW 140/90: ICD-10-CM

## 2019-08-09 LAB
ANION GAP SERPL CALCULATED.3IONS-SCNC: 10 MMOL/L (ref 9–17)
BUN BLDV-MCNC: 15 MG/DL (ref 8–23)
BUN/CREAT BLD: ABNORMAL (ref 9–20)
CALCIUM SERPL-MCNC: 9.8 MG/DL (ref 8.6–10.4)
CHLORIDE BLD-SCNC: 106 MMOL/L (ref 98–107)
CO2: 23 MMOL/L (ref 20–31)
CREAT SERPL-MCNC: 0.8 MG/DL (ref 0.5–0.9)
GFR AFRICAN AMERICAN: >60 ML/MIN
GFR NON-AFRICAN AMERICAN: >60 ML/MIN
GFR SERPL CREATININE-BSD FRML MDRD: ABNORMAL ML/MIN/{1.73_M2}
GFR SERPL CREATININE-BSD FRML MDRD: ABNORMAL ML/MIN/{1.73_M2}
GLUCOSE BLD-MCNC: 112 MG/DL (ref 70–99)
INR BLD: 1.9
POTASSIUM SERPL-SCNC: 5 MMOL/L (ref 3.7–5.3)
PROTIME: 23.2 SECONDS
SODIUM BLD-SCNC: 139 MMOL/L (ref 135–144)
THYROXINE, FREE: 1.13 NG/DL (ref 0.93–1.7)
TSH SERPL DL<=0.05 MIU/L-ACNC: 9.23 MIU/L (ref 0.3–5)

## 2019-08-09 PROCEDURE — 80048 BASIC METABOLIC PNL TOTAL CA: CPT

## 2019-08-09 PROCEDURE — 85610 PROTHROMBIN TIME: CPT

## 2019-08-09 PROCEDURE — 99211 OFF/OP EST MAY X REQ PHY/QHP: CPT

## 2019-08-09 PROCEDURE — 84439 ASSAY OF FREE THYROXINE: CPT

## 2019-08-09 PROCEDURE — 36415 COLL VENOUS BLD VENIPUNCTURE: CPT

## 2019-08-09 PROCEDURE — 84443 ASSAY THYROID STIM HORMONE: CPT

## 2019-09-06 ENCOUNTER — HOSPITAL ENCOUNTER (OUTPATIENT)
Dept: PHARMACY | Age: 84
Setting detail: THERAPIES SERIES
Discharge: HOME | End: 2019-09-06
Payer: COMMERCIAL

## 2019-09-06 DIAGNOSIS — I48.20 CHRONIC ATRIAL FIBRILLATION (HCC): ICD-10-CM

## 2019-09-06 LAB
INR BLD: 1.9
PROTIME: 23.3 SECONDS

## 2019-09-06 PROCEDURE — 99211 OFF/OP EST MAY X REQ PHY/QHP: CPT

## 2019-09-06 PROCEDURE — 85610 PROTHROMBIN TIME: CPT

## 2019-09-06 NOTE — PROGRESS NOTES
Patient states compliant with regimen. No bleeding or thromboembolic side effects noted. No significant med or dietary changes. No significant recent illness or disease state changes. PT/INR done in office per protocol. INR today is 1.9. Patient understands dosing directions and information discussed. Dosing card given to patient. Progress note faxed to office. INR subtherapeutic at 1.9.  Goal 2-3  Increase warfarin dose to 3 mg daily  The patient's daughter refuses to bring the patient back earlier than every four weeks  Recheck INR in four weeks

## 2019-10-04 ENCOUNTER — HOSPITAL ENCOUNTER (OUTPATIENT)
Dept: PHARMACY | Age: 84
Setting detail: THERAPIES SERIES
Discharge: HOME | End: 2019-10-04
Payer: COMMERCIAL

## 2019-10-04 DIAGNOSIS — I48.20 CHRONIC ATRIAL FIBRILLATION (HCC): ICD-10-CM

## 2019-10-04 LAB
INR BLD: 2.2
PROTIME: 26.3 SECONDS

## 2019-10-04 PROCEDURE — 85610 PROTHROMBIN TIME: CPT

## 2019-10-04 PROCEDURE — 99211 OFF/OP EST MAY X REQ PHY/QHP: CPT

## 2019-11-01 ENCOUNTER — HOSPITAL ENCOUNTER (OUTPATIENT)
Dept: PHARMACY | Age: 84
Setting detail: THERAPIES SERIES
Discharge: HOME | End: 2019-11-01
Payer: COMMERCIAL

## 2019-11-01 DIAGNOSIS — I48.20 CHRONIC ATRIAL FIBRILLATION (HCC): ICD-10-CM

## 2019-11-01 LAB
INR BLD: 2.9
PROTIME: 34.3 SECONDS

## 2019-11-01 PROCEDURE — 85610 PROTHROMBIN TIME: CPT

## 2019-11-01 PROCEDURE — 99211 OFF/OP EST MAY X REQ PHY/QHP: CPT

## 2019-11-15 ENCOUNTER — TELEPHONE (OUTPATIENT)
Dept: PHARMACY | Age: 84
End: 2019-11-15

## 2019-11-15 RX ORDER — WARFARIN SODIUM 3 MG/1
3 TABLET ORAL DAILY
Qty: 90 TABLET | Refills: 1 | Status: SHIPPED | OUTPATIENT
Start: 2019-11-15 | End: 2019-12-06 | Stop reason: SDUPTHER

## 2019-12-06 ENCOUNTER — HOSPITAL ENCOUNTER (OUTPATIENT)
Dept: PHARMACY | Age: 84
Setting detail: THERAPIES SERIES
Discharge: HOME | End: 2019-12-06
Payer: COMMERCIAL

## 2019-12-06 DIAGNOSIS — I48.20 CHRONIC ATRIAL FIBRILLATION (HCC): ICD-10-CM

## 2019-12-06 LAB
INR BLD: 3.2
PROTIME: 38.7 SECONDS

## 2019-12-06 PROCEDURE — 85610 PROTHROMBIN TIME: CPT

## 2019-12-06 PROCEDURE — 99212 OFFICE O/P EST SF 10 MIN: CPT

## 2019-12-06 RX ORDER — WARFARIN SODIUM 3 MG/1
3 TABLET ORAL DAILY
Qty: 90 TABLET | Refills: 1 | Status: SHIPPED | OUTPATIENT
Start: 2019-12-06 | End: 2020-10-28

## 2019-12-20 ENCOUNTER — HOSPITAL ENCOUNTER (OUTPATIENT)
Dept: PHARMACY | Age: 84
Setting detail: THERAPIES SERIES
Discharge: HOME | End: 2019-12-20
Payer: COMMERCIAL

## 2019-12-20 DIAGNOSIS — I48.20 CHRONIC ATRIAL FIBRILLATION (HCC): ICD-10-CM

## 2019-12-20 LAB
INR BLD: 2.4
PROTIME: 28.5 SECONDS

## 2019-12-20 PROCEDURE — 85610 PROTHROMBIN TIME: CPT

## 2019-12-20 PROCEDURE — 99211 OFF/OP EST MAY X REQ PHY/QHP: CPT

## 2020-01-06 ENCOUNTER — APPOINTMENT (OUTPATIENT)
Dept: CT IMAGING | Age: 85
End: 2020-01-06
Payer: COMMERCIAL

## 2020-01-06 ENCOUNTER — HOSPITAL ENCOUNTER (EMERGENCY)
Age: 85
Discharge: HOME | End: 2020-01-06
Attending: EMERGENCY MEDICINE
Payer: COMMERCIAL

## 2020-01-06 VITALS
RESPIRATION RATE: 16 BRPM | HEIGHT: 59 IN | DIASTOLIC BLOOD PRESSURE: 73 MMHG | WEIGHT: 138 LBS | TEMPERATURE: 97.4 F | OXYGEN SATURATION: 96 % | HEART RATE: 66 BPM | BODY MASS INDEX: 27.82 KG/M2 | SYSTOLIC BLOOD PRESSURE: 154 MMHG

## 2020-01-06 LAB
-: ABNORMAL
ABSOLUTE EOS #: 0.1 K/UL (ref 0–0.4)
ABSOLUTE IMMATURE GRANULOCYTE: ABNORMAL K/UL (ref 0–0.3)
ABSOLUTE LYMPH #: 2 K/UL (ref 1–4.8)
ABSOLUTE MONO #: 0.6 K/UL (ref 0.1–1.3)
ALBUMIN SERPL-MCNC: 4 G/DL (ref 3.5–5.2)
ALBUMIN/GLOBULIN RATIO: ABNORMAL (ref 1–2.5)
ALP BLD-CCNC: 96 U/L (ref 35–104)
ALT SERPL-CCNC: 11 U/L (ref 5–33)
AMORPHOUS: ABNORMAL
ANION GAP SERPL CALCULATED.3IONS-SCNC: 14 MMOL/L (ref 9–17)
AST SERPL-CCNC: 21 U/L
BACTERIA: ABNORMAL
BASOPHILS # BLD: 1 % (ref 0–2)
BASOPHILS ABSOLUTE: 0 K/UL (ref 0–0.2)
BILIRUB SERPL-MCNC: 0.36 MG/DL (ref 0.3–1.2)
BILIRUBIN URINE: NEGATIVE
BUN BLDV-MCNC: 12 MG/DL (ref 8–23)
BUN/CREAT BLD: ABNORMAL (ref 9–20)
CALCIUM SERPL-MCNC: 9.8 MG/DL (ref 8.6–10.4)
CASTS UA: ABNORMAL /LPF
CHLORIDE BLD-SCNC: 103 MMOL/L (ref 98–107)
CO2: 23 MMOL/L (ref 20–31)
COLOR: YELLOW
COMMENT UA: ABNORMAL
CREAT SERPL-MCNC: 0.69 MG/DL (ref 0.5–0.9)
CRYSTALS, UA: ABNORMAL /HPF
DIFFERENTIAL TYPE: ABNORMAL
EOSINOPHILS RELATIVE PERCENT: 2 % (ref 0–4)
EPITHELIAL CELLS UA: ABNORMAL /HPF
GFR AFRICAN AMERICAN: >60 ML/MIN
GFR NON-AFRICAN AMERICAN: >60 ML/MIN
GFR SERPL CREATININE-BSD FRML MDRD: ABNORMAL ML/MIN/{1.73_M2}
GFR SERPL CREATININE-BSD FRML MDRD: ABNORMAL ML/MIN/{1.73_M2}
GLUCOSE BLD-MCNC: 108 MG/DL (ref 70–99)
GLUCOSE URINE: NEGATIVE
HCT VFR BLD CALC: 42.6 % (ref 36–46)
HEMOGLOBIN: 14.4 G/DL (ref 12–16)
IMMATURE GRANULOCYTES: ABNORMAL %
KETONES, URINE: NEGATIVE
LACTIC ACID: 1.9 MMOL/L (ref 0.5–2.2)
LACTIC ACID: 2.3 MMOL/L (ref 0.5–2.2)
LEUKOCYTE ESTERASE, URINE: NEGATIVE
LIPASE: 10 U/L (ref 13–60)
LYMPHOCYTES # BLD: 36 % (ref 24–44)
MAGNESIUM: 1.9 MG/DL (ref 1.6–2.6)
MCH RBC QN AUTO: 32.3 PG (ref 26–34)
MCHC RBC AUTO-ENTMCNC: 33.7 G/DL (ref 31–37)
MCV RBC AUTO: 95.6 FL (ref 80–100)
MONOCYTES # BLD: 11 % (ref 1–7)
MUCUS: ABNORMAL
NITRITE, URINE: NEGATIVE
NRBC AUTOMATED: ABNORMAL PER 100 WBC
OTHER OBSERVATIONS UA: ABNORMAL
PDW BLD-RTO: 13.3 % (ref 11.5–14.9)
PH UA: 5.5 (ref 5–8)
PLATELET # BLD: 189 K/UL (ref 150–450)
PLATELET ESTIMATE: ABNORMAL
PMV BLD AUTO: 8.1 FL (ref 6–12)
POTASSIUM SERPL-SCNC: 4.8 MMOL/L (ref 3.7–5.3)
PROTEIN UA: ABNORMAL
RBC # BLD: 4.45 M/UL (ref 4–5.2)
RBC # BLD: ABNORMAL 10*6/UL
RBC UA: ABNORMAL /HPF
RENAL EPITHELIAL, UA: ABNORMAL /HPF
SEG NEUTROPHILS: 50 % (ref 36–66)
SEGMENTED NEUTROPHILS ABSOLUTE COUNT: 2.9 K/UL (ref 1.3–9.1)
SODIUM BLD-SCNC: 140 MMOL/L (ref 135–144)
SPECIFIC GRAVITY UA: 1.01 (ref 1–1.03)
TOTAL PROTEIN: 8 G/DL (ref 6.4–8.3)
TRICHOMONAS: ABNORMAL
TURBIDITY: CLEAR
URINE HGB: NEGATIVE
UROBILINOGEN, URINE: NORMAL
WBC # BLD: 5.7 K/UL (ref 3.5–11)
WBC # BLD: ABNORMAL 10*3/UL
WBC UA: ABNORMAL /HPF
YEAST: ABNORMAL

## 2020-01-06 PROCEDURE — 83690 ASSAY OF LIPASE: CPT

## 2020-01-06 PROCEDURE — 83735 ASSAY OF MAGNESIUM: CPT

## 2020-01-06 PROCEDURE — 93005 ELECTROCARDIOGRAM TRACING: CPT | Performed by: EMERGENCY MEDICINE

## 2020-01-06 PROCEDURE — 81001 URINALYSIS AUTO W/SCOPE: CPT

## 2020-01-06 PROCEDURE — 2580000003 HC RX 258: Performed by: EMERGENCY MEDICINE

## 2020-01-06 PROCEDURE — 6360000004 HC RX CONTRAST MEDICATION: Performed by: EMERGENCY MEDICINE

## 2020-01-06 PROCEDURE — 83605 ASSAY OF LACTIC ACID: CPT

## 2020-01-06 PROCEDURE — 80053 COMPREHEN METABOLIC PANEL: CPT

## 2020-01-06 PROCEDURE — 74174 CTA ABD&PLVS W/CONTRAST: CPT

## 2020-01-06 PROCEDURE — 85025 COMPLETE CBC W/AUTO DIFF WBC: CPT

## 2020-01-06 PROCEDURE — 99284 EMERGENCY DEPT VISIT MOD MDM: CPT

## 2020-01-06 PROCEDURE — 36415 COLL VENOUS BLD VENIPUNCTURE: CPT

## 2020-01-06 RX ORDER — 0.9 % SODIUM CHLORIDE 0.9 %
500 INTRAVENOUS SOLUTION INTRAVENOUS ONCE
Status: DISCONTINUED | OUTPATIENT
Start: 2020-01-06 | End: 2020-01-06 | Stop reason: HOSPADM

## 2020-01-06 RX ORDER — 0.9 % SODIUM CHLORIDE 0.9 %
80 INTRAVENOUS SOLUTION INTRAVENOUS ONCE
Status: COMPLETED | OUTPATIENT
Start: 2020-01-06 | End: 2020-01-06

## 2020-01-06 RX ORDER — SODIUM CHLORIDE 0.9 % (FLUSH) 0.9 %
10 SYRINGE (ML) INJECTION PRN
Status: DISCONTINUED | OUTPATIENT
Start: 2020-01-06 | End: 2020-01-06 | Stop reason: HOSPADM

## 2020-01-06 RX ADMIN — Medication 10 ML: at 05:21

## 2020-01-06 RX ADMIN — IOVERSOL 100 ML: 741 INJECTION INTRA-ARTERIAL; INTRAVENOUS at 05:21

## 2020-01-06 RX ADMIN — SODIUM CHLORIDE 80 ML: 9 INJECTION, SOLUTION INTRAVENOUS at 05:20

## 2020-01-06 ASSESSMENT — PAIN SCALES - GENERAL
PAINLEVEL_OUTOF10: 0
PAINLEVEL_OUTOF10: 2

## 2020-01-06 ASSESSMENT — ENCOUNTER SYMPTOMS
SHORTNESS OF BREATH: 0
VOMITING: 0
ABDOMINAL PAIN: 1
DIARRHEA: 0
COUGH: 0
BACK PAIN: 1

## 2020-01-06 ASSESSMENT — PAIN DESCRIPTION - LOCATION: LOCATION: BACK

## 2020-01-06 ASSESSMENT — PAIN DESCRIPTION - ORIENTATION: ORIENTATION: LEFT

## 2020-01-06 NOTE — ED NOTES
Mode of arrival (squad #, walk in, police, etc) : walk in from home        Chief complaint(s): flank pain, back pain        Arrival Note (brief scenario, treatment PTA, etc). : Pt presents with left flank pain radiating to back x 3 days. Pt states pain is intermittent and continues to wake her from sleep. Pt denies nausea, vomiting, diarrhea, CP, SOB. Pt denies urinary frequency, urgency, and dysuria. Pt hx of hypertension. Pt is A&Ox4, eupneic, PWD. GCS=15. Call light in reach. Daughter at bedside. C= \"Have you ever felt that you should Cut down on your drinking? \"  No  A= \"Have people Annoyed you by criticizing your drinking? \"  No  G= \"Have you ever felt bad or Guilty about your drinking? \"  No  E= \"Have you ever had a drink as an Eye-opener first thing in the morning to steady your nerves or to help a hangover? \"  No      Deferred []      Reason for deferring: N/A    *If yes to two or more: probable alcohol abuse. Oleksandr Santos RN  01/06/20 6827

## 2020-01-06 NOTE — ED NOTES
Pt resting comfortably in bed. Denies further needs at this time.  Daughter at bedside        Peggy Barillas RN  01/06/20 7989

## 2020-01-06 NOTE — ED PROVIDER NOTES
EMERGENCY DEPARTMENT ENCOUNTER    Pt Name: Deanne Moon  MRN: [id number]  [place] 7/30/1932  Date of evaluation: 1/6/20  CHIEF COMPLAINT       Chief Complaint   Patient presents with    Back Pain    Flank Pain     HISTORY OF PRESENT ILLNESS   HPI     This is a 51-year-old female with a history of hypertension who comes in today with abdominal pain it is on the left side of her abdomen and radiates to her back this is been going on for the past 3 days the patient states that she has been having a difficult time sleeping last night and so that is why she is here. She has a good appetite no nausea no vomiting the pain is not associated with eating she states if she stays in one area for too long that is when she has the pain. She denies any diarrhea no blood in the stools no burning with urination. No chest pain shortness of breath headache cough congestion. Of note, the patient had a similar episode of abdominal pain back in 2017. 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 REVIEW OF SYSTEMS     Review of Systems   Constitutional: Negative for fever. HENT: Negative for congestion. Respiratory: Negative for cough and shortness of breath. Cardiovascular: Negative for chest pain. Gastrointestinal: Positive for abdominal pain. Negative for diarrhea and vomiting. Genitourinary: Negative for dysuria. Musculoskeletal: Positive for back pain. Skin: Negative for rash. Neurological: Negative for headaches. All other systems reviewed and are negative.     PASTMEDICAL HISTORY     Past Medical History:   Diagnosis Date    Atrial fibrillation 3/28/2014    Back pain, chronic 6/1/2015    GERD (gastroesophageal reflux disease)     Hiatal hernia     History of breast cancer 12/1/2015    History of glaucoma     Hypertension, benign essential, goal below 140/90 6/1/2015    Hypothyroid 6/1/2015     SURGICAL HISTORY       Past Surgical History:   Procedure Laterality Date    BLADDER REPAIR      BREAST SURGERY      left mastectomy    CATARACT REMOVAL WITH IMPLANT      CHOLECYSTECTOMY       CURRENT MEDICATIONS       Previous Medications    AMLODIPINE (NORVASC) 5 MG TABLET    TAKE 1 TABLET BY MOUTH ONCE DAILY    ATENOLOL (TENORMIN) 50 MG TABLET    TAKE 1 TABLET BY MOUTH ONCE DAILY    CARDURA 1 MG TABLET    TAKE 1 TABLET BY MOUTH ONCE DAILY    DONEPEZIL (ARICEPT) 5 MG TABLET    TAKE 1 TABLET BY MOUTH NIGHTLY    FERROUS SULFATE 325 (65 FE) MG TABLET    TAKE 1 TABLET BY MOUTH DAILY WITH BREAKFAST    LATANOPROST (XALATAN) 0.005 % OPHTHALMIC SOLUTION    Apply to eye daily    LEVOTHYROXINE (SYNTHROID) 100 MCG TABLET    TAKE 1 TABLET BY MOUTH ONCE DAILY    MIRTAZAPINE (REMERON) 15 MG TABLET    TAKE (1) TABLET BY MOUTH NIGHTLY    MULTIPLE VITAMINS-MINERALS (ICAPS AREDS 2) CAPS    Take 1 tablet by mouth 2 times daily     OMEPRAZOLE (PRILOSEC) 20 MG DELAYED RELEASE CAPSULE    TAKE 1 CAPSULE BY MOUTH ONCE DAILY BEFORE A MEAL    POTASSIUM CHLORIDE (MICRO-K) 10 MEQ EXTENDED RELEASE CAPSULE    TAKE 1 CAPSULE BY MOUTH TWICE A DAY WITH FOOD    TIMOLOL (TIMOPTIC) 0.5 % OPHTHALMIC SOLUTION    instill 1 drop into both eyes twice a day    TRAMADOL (ULTRAM) 50 MG TABLET    Take 1 tablet by mouth every 8 hours as needed for Pain for up to 90 days. VITAMIN C (ASCORBIC ACID) 500 MG TABLET    TAKE 1 TABLET BY MOUTH DAILY    WARFARIN (COUMADIN) 3 MG TABLET    Take 1 tablet by mouth daily     ALLERGIES     is allergic to latex; penicillins; and tylenol [acetaminophen]. FAMILY HISTORY     She indicated that her mother is . She indicated that her father is .      SOCIAL HISTORY       Social History     Tobacco Use    Smoking status: Never Smoker    Smokeless tobacco: Never Used   Substance Use Topics    Alcohol use: No     Alcohol/week: 0.0 standard drinks    Drug use: No     PHYSICAL EXAM     INITIAL VITALS: BP (!) 154/73 Pulse 66   Temp 97.4 °F (36.3 °C) (Oral)   Resp 16   Ht 4' 11\" (1.499 m)   Wt 138 lb (62.6 kg)   SpO2 96%   BMI 27.87 kg/m²    Physical Exam  Vitals signs and nursing note reviewed. Constitutional:       General: She is not in acute distress. Appearance: She is well-developed. HENT:      Head: Normocephalic and atraumatic. Eyes:      Conjunctiva/sclera: Conjunctivae normal.   Neck:      Musculoskeletal: Neck supple. Cardiovascular:      Rate and Rhythm: Normal rate and regular rhythm. Heart sounds: No murmur. No friction rub. Pulmonary:      Effort: Pulmonary effort is normal. No respiratory distress. Breath sounds: Normal breath sounds. Abdominal:      General: There is no distension. Palpations: Abdomen is soft. There is no mass. Tenderness: There is no tenderness. There is no right CVA tenderness, left CVA tenderness, guarding or rebound. Hernia: No hernia is present. Genitourinary:     Comments: Pelvic exam chaperoned by [place] RN: Blood in the posterior fornix difficult to visualize the cervix secondary to the patient's body habitus and menses, no cervical motion tenderness on bimanual exam suprapubic abdominal tenderness no adnexal tenderness  Skin:     General: Skin is warm and dry. Capillary Refill: Capillary refill takes less than 2 seconds. Findings: No rash. Neurological:      Mental Status: She is alert. DIAGNOSTIC RESULTS   RADIOLOGY:All plain film, CT, MRI, and formal ultrasound images (except ED bedside ultrasound) are read by the radiologist, see reports below, unless otherwisenoted in MDM or here. CTA ABDOMEN PELVIS W CONTRAST   Preliminary Result   1. 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 2. 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 3. Hepatic steatosis. 4. Fat containing right inguinal hernia.            LABS: All lab results were reviewed by myself, and all abnormals are listed below. Labs Reviewed   CBC WITH AUTO DIFFERENTIAL - Abnormal; Notable for the following components:       Result Value    Monocytes 11 (*)     All other components within normal limits   COMPREHENSIVE METABOLIC PANEL - Abnormal; Notable for the following components:    Glucose 108 (*)     All other components within normal limits   LIPASE - Abnormal; Notable for the following components:    Lipase 10 (*)     All other components within normal limits   LACTIC ACID - Abnormal; Notable for the following components:    Lactic Acid 2.3 (*)     All other components within normal limits   URINALYSIS - Abnormal; Notable for the following components:    Protein, UA 2+ (*)     All other components within normal limits   MICROSCOPIC URINALYSIS - Abnormal; Notable for the following components:    Bacteria, UA FEW (*)     All other components within normal limits   MAGNESIUM   LACTIC ACID       EMERGENCY DEPARTMENTCOURSE:   Differential diagnosis includes chronic mesenteric ischemia, acute mesenteric ischemia, renal colic, urinary tract infection, shingles. There is no rash doubt shingles. I did look at the previous notes she had SMA obstruction 3 years ago and if this is intermittent in nature she could be having chronic arterial or venous mesenteric ischemia will obtain a lactate and a CT abdomen. 5:15 AM  Mild lactic acidosis at 2.3 no elevation in creatinine no electrolyte abnormality no anion gap elevation no elevation in LFTs or lipase no leukocytosis no anemia. Urinalysis reveals few bacteria with no nitrite or leukocyte esterace. 6:23 AM  CTA of the abdomen reveals chronic SMA occlusion but no acute process. Unknown etiology for the patient's abdominal pain however she has had no abdominal pain while in the emergency department. If lactate is decreasing will discharge. EKG: All EKG's are interpreted by the Emergency Department Physician who either signs or Co-signs this chart in the absence of a cardiologist.  Normal sinus rhythm with sinus arrhythmia with a heart rate of 65 bpm borderline IL interval at 208 ms no acute ST or T wave changes      Vitals:    Vitals:    01/06/20 0245 01/06/20 0300 01/06/20 0315 01/06/20 0500   BP: (!) 166/61 (!) 150/61 (!) 153/66 (!) 154/73   Pulse:    66   Resp:    16   Temp:       TempSrc:       SpO2: 96% 95% 95% 96%   Weight:       Height:           The patient was given the following medications while in the emergency department:  Orders Placed This Encounter   Medications    ioversol (OPTIRAY) 74 % injection 100 mL    sodium chloride flush 0.9 % injection 10 mL    0.9 % sodium chloride bolus    0.9 % sodium chloride bolus         FINAL IMPRESSION      1.  Abdominal pain, unspecified abdominal location         DISPOSITION/PLAN   DISPOSITION  01/06/2020 06:38:52 AM      PATIENT REFERRED TO:  Santosh Lui MD  [facility]  [address] [address]  [phone]    Schedule an appointment as soon as possible for a visit     Neena Wellington MD  Attending Emergency Physician    This charting supersedes any ED resident or staff charting and was written using speech recognition software       Neena Wellington MD  01/06/20 1048

## 2020-01-07 LAB
EKG ATRIAL RATE: 65 BPM
EKG P AXIS: 68 DEGREES
EKG P-R INTERVAL: 208 MS
EKG Q-T INTERVAL: 380 MS
EKG QRS DURATION: 78 MS
EKG QTC CALCULATION (BAZETT): 395 MS
EKG R AXIS: -19 DEGREES
EKG T AXIS: 47 DEGREES
EKG VENTRICULAR RATE: 65 BPM

## 2020-01-07 PROCEDURE — 93010 ELECTROCARDIOGRAM REPORT: CPT | Performed by: INTERNAL MEDICINE

## 2020-01-17 ENCOUNTER — HOSPITAL ENCOUNTER (OUTPATIENT)
Dept: PHARMACY | Age: 85
Setting detail: THERAPIES SERIES
Discharge: HOME | End: 2020-01-17
Payer: COMMERCIAL

## 2020-01-17 LAB
INR BLD: 3
PROTIME: 36 SECONDS

## 2020-01-17 PROCEDURE — 99211 OFF/OP EST MAY X REQ PHY/QHP: CPT

## 2020-01-17 PROCEDURE — 85610 PROTHROMBIN TIME: CPT

## 2020-01-17 NOTE — PROGRESS NOTES
Patient states compliant with regimen. No bleeding or thromboembolic side effects noted. No significant med or dietary changes. No significant recent illness or disease state changes. PT/INR done in office per protocol. INR today is 3. Patient understands dosing directions and information discussed. Dosing card given to patient. Progress note faxed to office. INR of 3 at the upper end of the therapeutic range.  Goal 2-3  The patient plans to consume increased greens  Continue warfarin 3 mg daily  Recheck INR in four weeks

## 2020-02-14 ENCOUNTER — HOSPITAL ENCOUNTER (OUTPATIENT)
Dept: PHARMACY | Age: 85
Setting detail: THERAPIES SERIES
End: 2020-02-14
Payer: COMMERCIAL

## 2020-02-21 ENCOUNTER — HOSPITAL ENCOUNTER (OUTPATIENT)
Dept: PHARMACY | Age: 85
Setting detail: THERAPIES SERIES
Discharge: HOME | End: 2020-02-21
Payer: COMMERCIAL

## 2020-02-21 LAB
INR BLD: 2.8
PROTIME: 33.6 SECONDS

## 2020-02-21 PROCEDURE — 99211 OFF/OP EST MAY X REQ PHY/QHP: CPT

## 2020-02-21 PROCEDURE — 85610 PROTHROMBIN TIME: CPT

## 2020-02-21 NOTE — PROGRESS NOTES
Patient states compliant all of the time with regimen. No bleeding or thromboembolic side effects noted. No significant med or dietary changes. No significant recent illness or disease state changes. PT/INR done in office per protocol. INR was therapeutic at 2.8 (goal 2-3)    Warfarin regimen will be continued at current dose 3 mg daily. Will retest in 4 weeks. Patient understands dosing directions and information discussed. Dosing schedule and follow up appointment given to patient. Progress note routed to referring physicians office. Patient acknowledges working in [address] [place] [address] with Pharmacist as referred by his/her physician.

## 2020-03-19 ENCOUNTER — TELEPHONE (OUTPATIENT)
Dept: PHARMACY | Age: 85
End: 2020-03-19

## 2020-04-15 ENCOUNTER — TELEPHONE (OUTPATIENT)
Dept: PHARMACY | Age: 85
End: 2020-04-15

## 2020-05-14 ENCOUNTER — TELEPHONE (OUTPATIENT)
Dept: PHARMACY | Age: 85
End: 2020-05-14

## 2020-05-14 NOTE — TELEPHONE ENCOUNTER
Spoke with patient  for COVID 19 screening secondary to upcoming appointment tomorrow . At this time patient denies symptoms, recent travel and exposure. Since patient has had stable INR will have patient get lab draw on scheduled day. Patient educated to call physician or go to ER with respiratory symptoms or fever and to not present to appointment if symptomatic. Will coordinate for next INR check accordingly.      Electronically signed by Ana Maria Hoover [address] [place] [address] on 5/14/20 at 5:11 PM EDT

## 2020-05-15 ENCOUNTER — HOSPITAL ENCOUNTER (OUTPATIENT)
Dept: PHARMACY | Age: 85
Setting detail: THERAPIES SERIES
Discharge: HOME | End: 2020-05-15
Payer: COMMERCIAL

## 2020-05-15 LAB
INR BLD: 2.7
PROTIME: 32.5 SECONDS

## 2020-05-15 PROCEDURE — 85610 PROTHROMBIN TIME: CPT

## 2020-05-15 PROCEDURE — 99211 OFF/OP EST MAY X REQ PHY/QHP: CPT

## 2020-06-09 ENCOUNTER — TELEPHONE (OUTPATIENT)
Dept: PHARMACY | Age: 85
End: 2020-06-09

## 2020-06-09 NOTE — TELEPHONE ENCOUNTER
Left voice message for patient informing that next appointment will be in the [facility] office. Please enter through the main hospital entrance on [address], temperature will be take, makses are required - one will be provided if patient does not have own. Ask to please not arrive earlier than 5 minutes prior to appointment so as to practice social distancing. Requested patient call clinic back to acknowledge receipt of  this message. Armin Tellez [facility].   [address] [facility]  [phone]

## 2020-06-12 ENCOUNTER — HOSPITAL ENCOUNTER (OUTPATIENT)
Dept: PHARMACY | Age: 85
Setting detail: THERAPIES SERIES
Discharge: HOME | End: 2020-06-12
Payer: COMMERCIAL

## 2020-06-12 ENCOUNTER — TELEPHONE (OUTPATIENT)
Dept: PHARMACY | Age: 85
End: 2020-06-12

## 2020-06-12 LAB
INR BLD: 2.8
PROTIME: 33.5 SECONDS

## 2020-06-12 PROCEDURE — 85610 PROTHROMBIN TIME: CPT

## 2020-06-12 PROCEDURE — 99211 OFF/OP EST MAY X REQ PHY/QHP: CPT

## 2020-07-08 ENCOUNTER — TELEPHONE (OUTPATIENT)
Dept: PHARMACY | Age: 85
End: 2020-07-08

## 2020-07-08 NOTE — TELEPHONE ENCOUNTER
Left message for patient for COVID 19 screening secondary to upcoming appointment Friday . Patient unavailable but left message for patient to contact us with any respiratory symptoms/fever/exposure or history of recent international travel. Message left for patient to call physician or go to ER with respiratory symptoms or fever and to not present to appointment if symptomatic. Will coordinate for next INR check accordingly.      Electronically signed by Salome Shaw [address] [place] [address] on 7/8/20 at 4:37 PM EDT

## 2020-07-10 ENCOUNTER — HOSPITAL ENCOUNTER (OUTPATIENT)
Dept: PHARMACY | Age: 85
Setting detail: THERAPIES SERIES
Discharge: HOME | End: 2020-07-10
Payer: COMMERCIAL

## 2020-07-10 VITALS — TEMPERATURE: 97.3 F

## 2020-07-10 LAB
INR BLD: 2.7
PROTIME: 31.9 SECONDS

## 2020-07-10 PROCEDURE — 99211 OFF/OP EST MAY X REQ PHY/QHP: CPT

## 2020-07-10 PROCEDURE — 85610 PROTHROMBIN TIME: CPT

## 2020-07-10 NOTE — PROGRESS NOTES
Patient seen in person in Medication Management Service. Patient states compliant most of the time with regimen. No bleeding or thromboembolic side effects noted. No significant med or dietary changes. No significant recent illness or disease state changes. PT/INR done via POC meter per protocol. INR was therapeutic at 2.7.  (goal 2 - 3)    Warfarin regimen will be continued at current dose 3 mg daily. Will retest in 4 weeks. Patient understands dosing directions and information discussed. Dosing schedule and follow up appointment given to patient. Progress note routed to referring physicians office. Patient acknowledges working in [address] [place] [address] with Pharmacist as referred by his/her physician. Standing order for PT/INR has been placed in preparation to transition to possible remote INR monitoring given efforts to reduce the spread of COVID-19.       CLINICAL PHARMACY CONSULT: MED RECONCILIATION/REVIEW ADDENDUM    For Pharmacy Admin Tracking Only    PHSO: No  Total # of Interventions Recommended: 0    - Maintenance Safety Lab Monitoring #: 1    Total Interventions Accepted: 0  Time Spent (min): 20    Martha Velazquez, EyadD

## 2020-08-06 ENCOUNTER — TELEPHONE (OUTPATIENT)
Dept: PHARMACY | Age: 85
End: 2020-08-06

## 2020-08-06 NOTE — TELEPHONE ENCOUNTER
Spoke with patient for COVID 19 screening secondary to upcoming appointment tomorrow . At this time patient denies symptoms, recent travel and exposure. Patient will report to [facility] for INR check at scheduled time. Patient educated to call physician or go to ER with respiratory symptoms or fever and to not present to appointment if symptomatic. Will coordinate for next INR check accordingly.

## 2020-08-07 ENCOUNTER — HOSPITAL ENCOUNTER (OUTPATIENT)
Dept: PHARMACY | Age: 85
Setting detail: THERAPIES SERIES
Discharge: HOME | End: 2020-08-07
Payer: COMMERCIAL

## 2020-08-07 VITALS — TEMPERATURE: 97.3 F

## 2020-08-07 LAB
INR BLD: 2.9
PROTIME: 35.3 SECONDS

## 2020-08-07 PROCEDURE — 99211 OFF/OP EST MAY X REQ PHY/QHP: CPT

## 2020-08-07 PROCEDURE — 85610 PROTHROMBIN TIME: CPT

## 2020-08-07 NOTE — PROGRESS NOTES
Patient seen in person in Medication Management Service. Patient states compliant all of the time with regimen. No bleeding or thromboembolic side effects noted. No significant med or dietary changes. No significant recent illness or disease state changes. No upcoming surgeries or procedures. Patient moving in with her daughter at the end of August  PT/INR done via POC meter per protocol. INR was therapeutic. INR = 2.9. INR goal range 2-3. Warfarin regimen will be continued at current dose 3 mg daily. Will retest in 4 weeks. Patient understands dosing directions and information discussed. Dosing schedule and follow up appointment given to patient. Progress note routed to referring physicians office. Patient acknowledges working in [address] [place] [address] with Pharmacist as referred by his/her physician. Standing order for PT/INR has been placed in preparation to transition to possible remote INR monitoring given efforts to reduce the spread of COVID-19.       CLINICAL PHARMACY CONSULT: MED RECONCILIATION/REVIEW ADDENDUM    For Pharmacy Admin Tracking Only    PHSO: No  Total # of Interventions Recommended: 0  - Maintenance Safety Lab Monitoring #: 1  Total Interventions Accepted: 0  Time Spent (min): Jason Reno, PharmD  [address]

## 2020-09-03 ENCOUNTER — TELEPHONE (OUTPATIENT)
Dept: PHARMACY | Age: 85
End: 2020-09-03

## 2020-09-03 NOTE — TELEPHONE ENCOUNTER
Attempted to call patient for COVID 19 screening secondary to upcoming appointment tomorrow . At this time patient is unavailable so voicemail left for patient to call us with any covic symptoms, contact or recent travel. Patient will report to [facility] or INR check at scheduled time. Patient educated to call physician or go to ER with respiratory symptoms or fever and to not present to appointment if symptomatic. Will coordinate for next INR check accordingly.

## 2020-09-04 ENCOUNTER — APPOINTMENT (OUTPATIENT)
Dept: PHARMACY | Age: 85
End: 2020-09-04
Payer: COMMERCIAL

## 2020-09-04 ENCOUNTER — TELEPHONE (OUTPATIENT)
Dept: PHARMACY | Age: 85
End: 2020-09-04

## 2020-09-04 NOTE — TELEPHONE ENCOUNTER
Patient was a no show for [facility] appointment today for INR check. Spoke with patient's daughter. She totally forgot about appointment. Appointment rescheduled for next week.    Dyan Weinstein, Pharm D, [facility]  9/4/2020 12:07 PM

## 2020-09-10 ENCOUNTER — TELEPHONE (OUTPATIENT)
Dept: PHARMACY | Age: 85
End: 2020-09-10

## 2020-09-11 ENCOUNTER — HOSPITAL ENCOUNTER (OUTPATIENT)
Age: 85
Setting detail: SPECIMEN
Discharge: HOME | End: 2020-09-11
Payer: COMMERCIAL

## 2020-09-11 ENCOUNTER — HOSPITAL ENCOUNTER (OUTPATIENT)
Dept: PHARMACY | Age: 85
Setting detail: THERAPIES SERIES
Discharge: HOME | End: 2020-09-11
Payer: COMMERCIAL

## 2020-09-11 VITALS — TEMPERATURE: 96 F

## 2020-09-11 LAB
INR BLD: 6.9
PROTHROMBIN TIME: 60.6 SEC (ref 11.8–14.6)

## 2020-09-11 PROCEDURE — 99212 OFFICE O/P EST SF 10 MIN: CPT

## 2020-09-11 PROCEDURE — 36415 COLL VENOUS BLD VENIPUNCTURE: CPT

## 2020-09-11 PROCEDURE — 85610 PROTHROMBIN TIME: CPT

## 2020-09-11 NOTE — PROGRESS NOTES
Patient seen in person in Medication Management Service. Patient states compliant all of the time with regimen. No bleeding or thromboembolic side effects noted. Denies any signs of bleeding  No significant dietary changes. No significant disease state changes. Patient has been on Bactrim for eye infection since last Friday. Will finish tomorrow    PT/INR done via POC meter per protocol. INR was >6 than on POC machine in office. Patient sent to [address] [place] [address] lab for venous draw. INR was supratherapeutic at 6.9.  (goal 2 - 3)    Patient likes broccoli and will have some tonight as well. Warfarin regimen will be held for 3 days. Will retest in 3 days prior to restarting. Patient counseled on signs/symptoms of bleeding and when to seek medical attention. Patient instructed to use extra precautions with elevated INR. Patient understands dosing directions and information discussed. Dosing schedule and follow up appointment given to patient. Progress note routed to referring physicians office. Patient acknowledges working in [address] [place] [address] with Pharmacist as referred by his/her physician. Standing order for PT/INR has been placed in preparation to transition to possible remote INR monitoring given efforts to reduce the spread of COVID-19.       CLINICAL PHARMACY CONSULT: MED RECONCILIATION/REVIEW ADDENDUM    For Pharmacy Admin Tracking Only    PHSO: No  Total # of Interventions Recommended: 1  - Decreased Dose #: 1  - Maintenance Safety Lab Monitoring #: 1  Total Interventions Accepted: 1  Time Spent (min): [address], EyadD

## 2020-09-11 NOTE — PROGRESS NOTES
Called Dr. Dain Bullock office per protocol to inform of the office of elevated INR 6.9 and plan for warfarin. Dr. Dain Bullock nurse was not available. Message left on nurse voicemail.    Braden Menchaca, Pharm D, [facility]  [facility]  9/11/2020 1:21 PM

## 2020-09-14 ENCOUNTER — HOSPITAL ENCOUNTER (OUTPATIENT)
Dept: PHARMACY | Age: 85
Setting detail: THERAPIES SERIES
Discharge: HOME | End: 2020-09-14
Payer: COMMERCIAL

## 2020-09-14 VITALS — TEMPERATURE: 98.9 F

## 2020-09-14 LAB
INR BLD: 2.5
PROTIME: 30.6 SECONDS

## 2020-09-14 PROCEDURE — 99211 OFF/OP EST MAY X REQ PHY/QHP: CPT

## 2020-09-14 PROCEDURE — 85610 PROTHROMBIN TIME: CPT

## 2020-09-14 NOTE — PROGRESS NOTES
Patient seen in person in Medication Management Service. Patient states compliant all of the time with regimen. No bleeding or thromboembolic side effects noted. No significant dietary changes. Patient did have some broccoli over the weekend. Patient has finished the Bactrim. Last dose was 9/12. Eye is much better. No significant recent illness or disease state changes. PT/INR done via POC meter per protocol. INR was therapeutic at 2.5.  (goal 2 - 3)    Warfarin regimen will be restarted at 3 mg daily as patient has finished Bactrim. Will retest in 1 week. Patient understands dosing directions and information discussed. Dosing schedule and follow up appointment given to patient. Progress note routed to referring physicians office. Patient acknowledges working in [address] [place] [address] with Pharmacist as referred by his/her physician. Standing order for PT/INR has been placed in preparation to transition to possible remote INR monitoring given efforts to reduce the spread of COVID-19.       CLINICAL PHARMACY CONSULT: MED RECONCILIATION/REVIEW ADDENDUM    For Pharmacy Admin Tracking Only    PHSO: No  Total # of Interventions Recommended: 1  - Increased Dose #: 1  - Maintenance Safety Lab Monitoring #: 1  Total Interventions Accepted: 1  Time Spent (min): [address], EyadD

## 2020-09-24 ENCOUNTER — TELEPHONE (OUTPATIENT)
Dept: PHARMACY | Age: 85
End: 2020-09-24

## 2020-09-24 NOTE — TELEPHONE ENCOUNTER
Attempted to call patient for COVID 19 screening secondary to upcoming appointment tomorrow . At this time patient is unavailable so voicemail left for patient to call us with any covic symptoms, contact or recent travel. Patient will report to [address] or INR check at scheduled time. Patient educated to call physician or go to ER with respiratory symptoms or fever and to not present to appointment if symptomatic. Will coordinate for next INR check accordingly.

## 2020-09-25 ENCOUNTER — HOSPITAL ENCOUNTER (OUTPATIENT)
Dept: PHARMACY | Age: 85
Setting detail: THERAPIES SERIES
Discharge: HOME | End: 2020-09-25
Payer: COMMERCIAL

## 2020-09-25 LAB
INR BLD: 1.6
PROTIME: 18.9 SECONDS

## 2020-09-25 PROCEDURE — 85610 PROTHROMBIN TIME: CPT

## 2020-09-25 PROCEDURE — 99212 OFFICE O/P EST SF 10 MIN: CPT

## 2020-09-25 NOTE — PROGRESS NOTES
Patient seen in person in Medication Management Service. Patient states compliant all of the time with regimen. No bleeding or thromboembolic side effects noted. No significant med or dietary changes. Patient has continued to eat  Increased salads, spinach, broccoli due to high INR while on antibiotics  Will return to normal intake  No significant recent illness or disease state changes. No upcoming surgeries or procedures. PT/INR done via POC meter per protocol. INR was subtherapeutic. INR = 1.6. INR goal range 2-3. Warfarin regimen will be continued at current dose 3 mg daily. Will retest in 2 weeks. Patient understands dosing directions and information discussed. Dosing schedule and follow up appointment given to patient. Progress note routed to referring physicians office. Patient acknowledges working in [address] [place] [address] with Pharmacist as referred by his/her physician. Standing order for PT/INR has been placed in preparation to transition to possible remote INR monitoring given efforts to reduce the spread of COVID-19.       CLINICAL PHARMACY CONSULT: MED RECONCILIATION/REVIEW ADDENDUM    For Pharmacy Admin Tracking Only    PHSO: No  Total # of Interventions Recommended: 0  - Maintenance Safety Lab Monitoring #: 1  Total Interventions Accepted: 0  Time Spent (min): [address], PharmD  [address]

## 2020-10-08 ENCOUNTER — TELEPHONE (OUTPATIENT)
Dept: PHARMACY | Age: 85
End: 2020-10-08

## 2020-10-08 NOTE — TELEPHONE ENCOUNTER
Spoke with patient for COVID 19 screening secondary to upcoming appointment tomorrow . At this time patient denies symptoms, recent (previous 14 days) positive covid test, recent travel and exposure. Patient will report to [facility] at scheduled time. Patient educated to call physician or go to ER with respiratory symptoms or fever and to not present to appointment if symptomatic. Will coordinate for next appointment accordingly.

## 2020-10-09 ENCOUNTER — HOSPITAL ENCOUNTER (OUTPATIENT)
Dept: PHARMACY | Age: 85
Setting detail: THERAPIES SERIES
Discharge: HOME | End: 2020-10-09
Payer: COMMERCIAL

## 2020-10-09 VITALS — TEMPERATURE: 96.9 F

## 2020-10-09 LAB
INR BLD: 2.2
PROTIME: 26.3 SECONDS

## 2020-10-09 PROCEDURE — 99211 OFF/OP EST MAY X REQ PHY/QHP: CPT

## 2020-10-09 PROCEDURE — 85610 PROTHROMBIN TIME: CPT

## 2020-10-09 NOTE — PROGRESS NOTES
Patient seen in person in Medication Management Service. Patient states compliant most of the time with regimen. No bleeding or thromboembolic side effects noted. No significant med or dietary changes. No significant recent illness or disease state changes. PT/INR done via POC meter per protocol. INR was therapeutic at 2.2.  (goal 2 - 3)    Warfarin regimen will be continued at current dose 3 mg daily. Will retest in 4 weeks. Patient understands dosing directions and information discussed. Dosing schedule and follow up appointment given to patient. Progress note routed to referring physicians office. Patient acknowledges working in [address] [place] [address] with Pharmacist as referred by his/her physician. Standing order for PT/INR has been placed in preparation to transition to possible remote INR monitoring given efforts to reduce the spread of COVID-19.       CLINICAL PHARMACY CONSULT: MED RECONCILIATION/REVIEW ADDENDUM    For Pharmacy Admin Tracking Only    PHSO: No  Total # of Interventions Recommended: 0    - Maintenance Safety Lab Monitoring #: 1  Total Interventions Accepted: 0  Time Spent (min): 20    Sue Armando, EyadD

## 2020-11-05 ENCOUNTER — TELEPHONE (OUTPATIENT)
Dept: PHARMACY | Age: 85
End: 2020-11-05

## 2020-11-06 ENCOUNTER — HOSPITAL ENCOUNTER (OUTPATIENT)
Dept: PHARMACY | Age: 85
Setting detail: THERAPIES SERIES
Discharge: HOME | End: 2020-11-06
Payer: COMMERCIAL

## 2020-11-06 VITALS — TEMPERATURE: 97.1 F

## 2020-11-06 LAB
INR BLD: 2.7
PROTIME: 31.9 SECONDS

## 2020-11-06 PROCEDURE — 85610 PROTHROMBIN TIME: CPT | Performed by: PHARMACIST

## 2020-11-06 PROCEDURE — 99211 OFF/OP EST MAY X REQ PHY/QHP: CPT | Performed by: PHARMACIST

## 2020-12-03 ENCOUNTER — TELEPHONE (OUTPATIENT)
Dept: PHARMACY | Age: 85
End: 2020-12-03

## 2020-12-04 ENCOUNTER — HOSPITAL ENCOUNTER (OUTPATIENT)
Dept: PHARMACY | Age: 85
Setting detail: THERAPIES SERIES
Discharge: HOME | End: 2020-12-04
Payer: COMMERCIAL

## 2020-12-04 VITALS — TEMPERATURE: 96.9 F

## 2020-12-04 LAB
INR BLD: 2.2
PROTIME: 26.8 SECONDS

## 2020-12-04 PROCEDURE — 85610 PROTHROMBIN TIME: CPT

## 2020-12-04 PROCEDURE — 99211 OFF/OP EST MAY X REQ PHY/QHP: CPT

## 2020-12-04 NOTE — PROGRESS NOTES
Patient seen in person in Medication Management Service. Patient states compliant most of the time with regimen. No bleeding or thromboembolic side effects noted. No significant med or dietary changes. No significant recent illness or disease state changes. PT/INR done via POC meter per protocol. INR was therapeutic at 2.2.  (goal 2 - 3)    Warfarin regimen will be continued at current dose 3 mg daily. Will retest in 4 weeks. Patient understands dosing directions and information discussed. Dosing schedule and follow up appointment given to patient. Progress note routed to referring physicians office. Patient acknowledges working in [address] [place] [address] with Pharmacist as referred by his/her physician. Standing order for PT/INR has been placed in preparation to transition to possible remote INR monitoring given efforts to reduce the spread of COVID-19.       CLINICAL PHARMACY CONSULT: MED RECONCILIATION/REVIEW ADDENDUM    For Pharmacy Admin Tracking Only    PHSO: No  Total # of Interventions Recommended: 0    - Maintenance Safety Lab Monitoring #: 1    Total Interventions Accepted: 0  Time Spent (min): 20    Josh Pires PharmD

## 2021-01-07 ENCOUNTER — TELEPHONE (OUTPATIENT)
Dept: PHARMACY | Age: 86
End: 2021-01-07

## 2021-01-08 ENCOUNTER — TELEPHONE (OUTPATIENT)
Dept: PHARMACY | Age: 86
End: 2021-01-08

## 2021-01-08 ENCOUNTER — APPOINTMENT (OUTPATIENT)
Dept: PHARMACY | Age: 86
End: 2021-01-08
Payer: COMMERCIAL

## 2021-01-14 ENCOUNTER — TELEPHONE (OUTPATIENT)
Dept: PHARMACY | Age: 86
End: 2021-01-14

## 2021-01-14 NOTE — TELEPHONE ENCOUNTER
Attempted to call patient for COVID 19 screening secondary to upcoming appointment tomorrow . At this time patient is unavailable with either no answer or no voicemail available. Patient will report to [facility] at scheduled time. Patient will be screened at entry to hospital for fever. Will coordinate for next appointment accordingly.

## 2021-01-15 ENCOUNTER — HOSPITAL ENCOUNTER (OUTPATIENT)
Age: 86
Setting detail: SPECIMEN
Discharge: HOME | End: 2021-01-15
Payer: COMMERCIAL

## 2021-01-15 ENCOUNTER — HOSPITAL ENCOUNTER (OUTPATIENT)
Dept: PHARMACY | Age: 86
Setting detail: THERAPIES SERIES
Discharge: HOME | End: 2021-01-15
Payer: COMMERCIAL

## 2021-01-15 DIAGNOSIS — I10 HYPERTENSION, BENIGN ESSENTIAL, GOAL BELOW 140/90: ICD-10-CM

## 2021-01-15 DIAGNOSIS — E03.9 HYPOTHYROIDISM, UNSPECIFIED TYPE: ICD-10-CM

## 2021-01-15 DIAGNOSIS — I48.20 CHRONIC ATRIAL FIBRILLATION (HCC): ICD-10-CM

## 2021-01-15 LAB
ANION GAP SERPL CALCULATED.3IONS-SCNC: 10 MMOL/L (ref 9–17)
BUN BLDV-MCNC: 15 MG/DL (ref 8–23)
BUN/CREAT BLD: ABNORMAL (ref 9–20)
CALCIUM SERPL-MCNC: 9.3 MG/DL (ref 8.6–10.4)
CHLORIDE BLD-SCNC: 104 MMOL/L (ref 98–107)
CO2: 23 MMOL/L (ref 20–31)
CREAT SERPL-MCNC: 0.76 MG/DL (ref 0.5–0.9)
GFR AFRICAN AMERICAN: >60 ML/MIN
GFR NON-AFRICAN AMERICAN: >60 ML/MIN
GFR SERPL CREATININE-BSD FRML MDRD: ABNORMAL ML/MIN/{1.73_M2}
GFR SERPL CREATININE-BSD FRML MDRD: ABNORMAL ML/MIN/{1.73_M2}
GLUCOSE BLD-MCNC: 102 MG/DL (ref 70–99)
INR BLD: 2.4
POTASSIUM SERPL-SCNC: 4.6 MMOL/L (ref 3.7–5.3)
PROTIME: 29.1 SECONDS
SODIUM BLD-SCNC: 137 MMOL/L (ref 135–144)
THYROXINE, FREE: 1.17 NG/DL (ref 0.93–1.7)
TSH SERPL DL<=0.05 MIU/L-ACNC: 1.69 MIU/L (ref 0.3–5)

## 2021-01-15 PROCEDURE — 85610 PROTHROMBIN TIME: CPT

## 2021-01-15 PROCEDURE — 80048 BASIC METABOLIC PNL TOTAL CA: CPT

## 2021-01-15 PROCEDURE — 99211 OFF/OP EST MAY X REQ PHY/QHP: CPT

## 2021-01-15 PROCEDURE — 84439 ASSAY OF FREE THYROXINE: CPT

## 2021-01-15 PROCEDURE — 84443 ASSAY THYROID STIM HORMONE: CPT

## 2021-01-15 PROCEDURE — 36415 COLL VENOUS BLD VENIPUNCTURE: CPT

## 2021-01-15 NOTE — PROGRESS NOTES
Patient seen in person in Medication Management Service. Patient states compliant most of the time with regimen. No bleeding or thromboembolic side effects noted. No significant med or dietary changes. No significant recent illness or disease state changes. PT/INR done via POC meter per protocol. INR was therapeutic at 2.4.  (goal 2 - 3)    Warfarin regimen will be continued at current dose 3 mg daily. Will retest in 4 weeks. Patient understands dosing directions and information discussed. Dosing schedule and follow up appointment given to patient. Progress note routed to referring physicians office. Patient acknowledges working in [address] [place] [address] with Pharmacist as referred by his/her physician. Standing order for PT/INR has been placed in preparation to transition to possible remote INR monitoring given efforts to reduce the spread of COVID-19.       CLINICAL PHARMACY CONSULT: MED RECONCILIATION/REVIEW ADDENDUM    For Pharmacy Admin Tracking Only    PHSO: No  Total # of Interventions Recommended: 0    - Maintenance Safety Lab Monitoring #: 1  Total Interventions Accepted: 0  Time Spent (min): 20    [place] PharmD

## 2021-02-11 ENCOUNTER — TELEPHONE (OUTPATIENT)
Dept: PHARMACY | Age: 86
End: 2021-02-11

## 2021-02-11 NOTE — TELEPHONE ENCOUNTER
Attempted to call patient for COVID 19 screening secondary to upcoming appointment tomorrow . At this time patient is unavailable so voicemail left for patient to call us with any covid symptoms, recent (previous 14 days) positive covid test, contact or recent travel. Patient will report to [facility] at scheduled time. Patient educated to call physician or go to ER with respiratory symptoms or fever and to not present to appointment if symptomatic. Will coordinate for next appointment accordingly.

## 2021-02-12 ENCOUNTER — TELEPHONE (OUTPATIENT)
Dept: PHARMACY | Age: 86
End: 2021-02-12

## 2021-02-12 ENCOUNTER — APPOINTMENT (OUTPATIENT)
Dept: PHARMACY | Age: 86
End: 2021-02-12
Payer: COMMERCIAL

## 2021-02-25 ENCOUNTER — TELEPHONE (OUTPATIENT)
Dept: PHARMACY | Age: 86
End: 2021-02-25

## 2021-02-25 NOTE — TELEPHONE ENCOUNTER
Spoke with patient for COVID 19 screening secondary to upcoming appointment tomorrow . At this time patient denies symptoms, recent (previous 14 days) positive covid test, recent travel and exposure. Patient will report to [address] at scheduled time. Patient educated to call physician or go to ER with respiratory symptoms or fever and to not present to appointment if symptomatic. Will coordinate for next appointment accordingly.

## 2021-02-26 ENCOUNTER — HOSPITAL ENCOUNTER (OUTPATIENT)
Dept: PHARMACY | Age: 86
Setting detail: THERAPIES SERIES
Discharge: HOME | End: 2021-02-26
Payer: COMMERCIAL

## 2021-02-26 VITALS — TEMPERATURE: 96.4 F

## 2021-02-26 DIAGNOSIS — I48.20 CHRONIC ATRIAL FIBRILLATION (HCC): ICD-10-CM

## 2021-02-26 LAB
INR BLD: 1.7
PROTIME: 19.9 SECONDS

## 2021-02-26 PROCEDURE — 85610 PROTHROMBIN TIME: CPT

## 2021-02-26 PROCEDURE — 99211 OFF/OP EST MAY X REQ PHY/QHP: CPT

## 2021-02-26 NOTE — PROGRESS NOTES
Patient seen in person in Medication Management Service. Patient states noncompliant some of the time with regimen. No bleeding or thromboembolic side effects noted. No significant med or dietary changes. No significant recent illness or disease state changes. PT/INR done via POC meter per protocol. INR was subtherapeutic at 1.7.  (goal 2 - 3)  The patient may have missed one dose of warfarin this past week  Warfarin regimen will be continued at current dose 3 mg daily. Will retest in 2 weeks. Patient understands dosing directions and information discussed. Dosing schedule and follow up appointment given to patient. Progress note routed to referring physicians office. Patient acknowledges working in [address] [place] [address] with Pharmacist as referred by his/her physician. Standing order for PT/INR has been placed in preparation to transition to possible remote INR monitoring given efforts to reduce the spread of COVID-19. CLINICAL PHARMACY CONSULT: MED RECONCILIATION/REVIEW ADDENDUM    For Pharmacy Admin Tracking Only    PHSO: No  Total # of Interventions Recommended: 0    - Maintenance Safety Lab Monitoring #: 1  Total Interventions Accepted: 0  Time Spent (min):  [address], PharmD

## 2021-03-11 ENCOUNTER — TELEPHONE (OUTPATIENT)
Dept: PHARMACY | Age: 86
End: 2021-03-11

## 2021-03-12 ENCOUNTER — HOSPITAL ENCOUNTER (OUTPATIENT)
Dept: PHARMACY | Age: 86
Setting detail: THERAPIES SERIES
Discharge: HOME | End: 2021-03-12
Payer: COMMERCIAL

## 2021-03-12 VITALS — TEMPERATURE: 96.9 F

## 2021-03-12 DIAGNOSIS — I48.20 CHRONIC ATRIAL FIBRILLATION (HCC): ICD-10-CM

## 2021-03-12 LAB
INR BLD: 1.8
PROTIME: 21.7 SECONDS

## 2021-03-12 PROCEDURE — 85610 PROTHROMBIN TIME: CPT

## 2021-03-12 PROCEDURE — 99212 OFFICE O/P EST SF 10 MIN: CPT

## 2021-03-12 NOTE — PROGRESS NOTES
Patient seen in person in Medication Management Service. Patient states compliant all of the time with regimen. No bleeding or thromboembolic side effects noted. No significant med or dietary changes. No significant recent illness or disease state changes. PT/INR done via POC meter per protocol. INR was subtherapeutic at 1.8.  (goal 2 - 3)    Warfarin regimen will be increased to 4.5 mg Tue/Sat and 3 mg all other days. Will retest in 3  weeks as her transportation will not be available at 2 weeks    Patient counseled on signs/symptoms of bleeding and when to seek medical attention. Patient instructed to use extra precautions with elevated INR.        Patient understands dosing directions and information discussed. Dosing schedule and follow up appointment given to patient. Progress note routed to referring physicians office. Patient acknowledges working in [address] [place] [address] with Pharmacist as referred by his/her physician. Standing order for PT/INR has been placed in preparation to transition to possible remote INR monitoring given efforts to reduce the spread of COVID-19. CLINICAL PHARMACY CONSULT: MED RECONCILIATION/REVIEW ADDENDUM    For Pharmacy Admin Tracking Only    PHSO: No  Total # of Interventions Recommended: 1  - Increased Dose #: 1  - Maintenance Safety Lab Monitoring #: 1  Total Interventions Accepted: 1  Time Spent (min): [facility].   Rosio Delgadillo Medication Management Services  [phone]

## 2021-04-02 ENCOUNTER — TELEPHONE (OUTPATIENT)
Dept: PHARMACY | Age: 86
End: 2021-04-02

## 2021-04-16 ENCOUNTER — HOSPITAL ENCOUNTER (OUTPATIENT)
Dept: PHARMACY | Age: 86
Setting detail: THERAPIES SERIES
Discharge: HOME | End: 2021-04-16
Payer: COMMERCIAL

## 2021-04-16 DIAGNOSIS — I48.20 CHRONIC ATRIAL FIBRILLATION (HCC): ICD-10-CM

## 2021-04-16 LAB
INR BLD: 1.8
PROTIME: 21 SECONDS

## 2021-04-16 PROCEDURE — 99211 OFF/OP EST MAY X REQ PHY/QHP: CPT

## 2021-04-16 PROCEDURE — 85610 PROTHROMBIN TIME: CPT

## 2021-04-30 ENCOUNTER — HOSPITAL ENCOUNTER (OUTPATIENT)
Dept: PHARMACY | Age: 86
Setting detail: THERAPIES SERIES
Discharge: HOME | End: 2021-04-30
Payer: COMMERCIAL

## 2021-04-30 DIAGNOSIS — I48.20 CHRONIC ATRIAL FIBRILLATION (HCC): ICD-10-CM

## 2021-04-30 LAB
INR BLD: 2.4
PROTIME: 25.5 SECONDS

## 2021-04-30 PROCEDURE — 85610 PROTHROMBIN TIME: CPT

## 2021-04-30 PROCEDURE — 99211 OFF/OP EST MAY X REQ PHY/QHP: CPT

## 2021-04-30 NOTE — PROGRESS NOTES
Patient seen in person in Medication Management Service. Patient states compliant most of the time with regimen. No bleeding or thromboembolic side effects noted. No significant med or dietary changes. No significant recent illness or disease state changes. PT/INR done via POC meter per protocol. INR was therapeutic at 2.4.  (goal 2 - 3)    Warfarin regimen will be continued at current dose 4.5 mg Tues/Sat and 3 mg all other days. Will retest in 4 weeks. The patient complains of light-headedness in the morning that resolves after her morning coffee  The patient is sleeping more and eating less  She was advised to contact her PCP for this for evaluation    Patient understands dosing directions and information discussed. Dosing schedule and follow up appointment given to patient. Progress note routed to referring physicians office. Patient acknowledges working in [address] [place] [address] with Pharmacist as referred by his/her physician/provider. COVID 19 screening completed. At this time patient denies symptoms, recent travel and exposure. Patient educated to screen for temperature and COVID-19 symptoms prior to coming to clinic for next appointment. They are instructed to call the clinic to reschedule if they have any symptoms. Standing order for PT/INR has been placed in preparation to transition to possible remote INR monitoring given efforts to reduce the spread of COVID-19.       For Pharmacy Admin Tracking Only     Intervention Detail:    Total # of Interventions Recommended: 0   Total # of Interventions Accepted: 0   Time Spent (min): 20

## 2021-05-01 ENCOUNTER — HOSPITAL ENCOUNTER (EMERGENCY)
Age: 86
Discharge: HOME | End: 2021-05-01
Attending: EMERGENCY MEDICINE
Payer: COMMERCIAL

## 2021-05-01 ENCOUNTER — APPOINTMENT (OUTPATIENT)
Dept: GENERAL RADIOLOGY | Age: 86
End: 2021-05-01
Payer: COMMERCIAL

## 2021-05-01 VITALS
HEIGHT: 59 IN | HEART RATE: 56 BPM | SYSTOLIC BLOOD PRESSURE: 159 MMHG | WEIGHT: 127 LBS | TEMPERATURE: 98.2 F | RESPIRATION RATE: 16 BRPM | BODY MASS INDEX: 25.6 KG/M2 | OXYGEN SATURATION: 97 % | DIASTOLIC BLOOD PRESSURE: 77 MMHG

## 2021-05-01 DIAGNOSIS — S62.622A CLOSED DISPLACED FRACTURE OF MIDDLE PHALANX OF RIGHT MIDDLE FINGER, INITIAL ENCOUNTER: Primary | ICD-10-CM

## 2021-05-01 DIAGNOSIS — S62.654A CLOSED NONDISPLACED FRACTURE OF MIDDLE PHALANX OF RIGHT RING FINGER, INITIAL ENCOUNTER: ICD-10-CM

## 2021-05-01 PROCEDURE — 73130 X-RAY EXAM OF HAND: CPT

## 2021-05-01 PROCEDURE — 99284 EMERGENCY DEPT VISIT MOD MDM: CPT

## 2021-05-01 PROCEDURE — 99283 EMERGENCY DEPT VISIT LOW MDM: CPT

## 2021-05-01 PROCEDURE — 6370000000 HC RX 637 (ALT 250 FOR IP): Performed by: SPECIALIST

## 2021-05-01 RX ORDER — TRAMADOL HYDROCHLORIDE 50 MG/1
50 TABLET ORAL ONCE
Status: COMPLETED | OUTPATIENT
Start: 2021-05-01 | End: 2021-05-01

## 2021-05-01 RX ADMIN — TRAMADOL HYDROCHLORIDE 50 MG: 50 TABLET, FILM COATED ORAL at 19:45

## 2021-05-01 ASSESSMENT — PAIN DESCRIPTION - ORIENTATION: ORIENTATION: RIGHT

## 2021-05-01 ASSESSMENT — PAIN SCALES - GENERAL
PAINLEVEL_OUTOF10: 1

## 2021-05-01 ASSESSMENT — PAIN DESCRIPTION - PROGRESSION: CLINICAL_PROGRESSION: NOT CHANGED

## 2021-05-02 NOTE — ED PROVIDER NOTES
phalanx. Skin is intact. Neurovascular examination is intact distally. X-rays revealed mild comminuted fracture of the mid diaphysis of the third digit. Fracture was reduced by physician assistant and closely supervised by myself and fingers were immobilized with a splint. Patient was referred to hand specialist as an outpatient.        Isabella Reynoso MD  05/02/21 [id number]

## 2021-05-02 NOTE — ED NOTES
Patient provided with discharge instructions, prescriptions, and follow up information. Verbalized understanding. No IV access to discontinue. A&OX3. Steady gait noted at discharge. Wheelchair declined by patient.      Anca Benz RN  05/01/21 2017

## 2021-05-04 ENCOUNTER — TELEPHONE (OUTPATIENT)
Dept: PHARMACY | Age: 86
End: 2021-05-04

## 2021-05-04 NOTE — TELEPHONE ENCOUNTER
Patient was to ED on 5/1 for hand injury and found to have two fractured fingers. Spoke to patient's daughter today who indicates bruising has gotten a bit darker since ED visit. Patient has appt with hand specialist on 5/6. Patient is not taking anything extra for pain currently. INR was 2.4 on appt day before ED visit (4/30). Will continue current warfarin dose and call with any changes/med changes. Dianne Long,Pharm. D,, BCPS, CACP  5/4/2021  4:51 PM

## 2021-05-06 PROBLEM — S62.622A CLOSED DISPLACED FRACTURE OF MIDDLE PHALANX OF RIGHT MIDDLE FINGER: Status: ACTIVE | Noted: 2021-05-06

## 2021-06-04 ENCOUNTER — TELEPHONE (OUTPATIENT)
Dept: PHARMACY | Age: 86
End: 2021-06-04

## 2021-06-04 NOTE — TELEPHONE ENCOUNTER
Patient was a no show for their ACS appointment this afternoon. Called and left a message requesting a call back for the patient to reschedule their appointment.    Sylvie Walker PharmD, [facility]  6/4/2021 1:54 PM

## 2021-06-08 ENCOUNTER — TELEPHONE (OUTPATIENT)
Dept: PHARMACY | Age: 86
End: 2021-06-08

## 2021-06-08 NOTE — TELEPHONE ENCOUNTER
Called patient to reschedule for missed INR appt last week. Spoke with patient's daughter, who stated that the best days for appointments are Fridays. Rescheduled patient for next Friday per patient preference.     Gaston Srinivasan, PharmD, 6/8/2021 3:50 PM

## 2021-06-18 ENCOUNTER — HOSPITAL ENCOUNTER (OUTPATIENT)
Dept: PHARMACY | Age: 86
Setting detail: THERAPIES SERIES
Discharge: HOME | End: 2021-06-18
Payer: COMMERCIAL

## 2021-06-18 DIAGNOSIS — I48.20 CHRONIC ATRIAL FIBRILLATION (HCC): Primary | ICD-10-CM

## 2021-06-18 LAB
INR BLD: 2.7
PROTIME: 32.4 SECONDS

## 2021-06-18 PROCEDURE — 85610 PROTHROMBIN TIME: CPT

## 2021-06-18 PROCEDURE — 99211 OFF/OP EST MAY X REQ PHY/QHP: CPT

## 2021-06-18 NOTE — PROGRESS NOTES
Patient seen in person in Medication Management Service. Patient states compliant most of the time with regimen. No bleeding or thromboembolic side effects noted. No significant med or dietary changes. No significant recent illness or disease state changes. PT/INR done via POC meter per protocol. INR was therapeutic at 2.7.  (goal 2 - 3)    Warfarin regimen will be continued at current dose 4.5 mg Tues/Sat and 3 mg all other days. Will retest in 4 weeks. Patient understands dosing directions and information discussed. Dosing schedule and follow up appointment given to patient. Progress note routed to referring physicians office. Patient acknowledges working in [address] [place] [address] with Pharmacist as referred by his/her physician/provider. COVID 19 screening completed. At this time patient denies symptoms, recent travel and exposure. Patient educated to screen for temperature and COVID-19 symptoms prior to coming to clinic for next appointment. They are instructed to call the clinic to reschedule if they have any symptoms. Standing order for PT/INR has been placed in preparation to transition to possible remote INR monitoring given efforts to reduce the spread of COVID-19.       For Pharmacy Admin Tracking Only     Intervention Detail:    Total # of Interventions Recommended: 0   Total # of Interventions Accepted: 0   Time Spent (min): 20

## 2021-07-15 ENCOUNTER — TELEPHONE (OUTPATIENT)
Dept: PHARMACY | Age: 86
End: 2021-07-15

## 2021-07-15 NOTE — TELEPHONE ENCOUNTER
Daughter called to cancel appointment for INR check on 7/16 states patient will be out of town. Last INR within range. Attempted to reschedule INR check the following week on 7/23. Not able to as available appointment times are not convenient for daughter and can only be on Fridays.  Appointment rescheduled 7/30

## 2021-07-30 ENCOUNTER — TELEPHONE (OUTPATIENT)
Dept: PHARMACY | Age: 86
End: 2021-07-30

## 2021-07-30 NOTE — TELEPHONE ENCOUNTER
Called patient due to them not showing up for their appt today in the [facility] Anticoagulation Service. Left message for them to call back to reschedule their appt. Indicated importance of keeping up on INR monitoring. Dianne Long [facility],Pharm. D,, BCPS, CACP  7/30/2021  2:05 PM

## 2021-08-04 ENCOUNTER — TELEPHONE (OUTPATIENT)
Dept: PHARMACY | Age: 86
End: 2021-08-04

## 2021-08-04 NOTE — TELEPHONE ENCOUNTER
Called patient to reschedule missed appointment in medication management (warfarin) office. Patient was last seen 6/18 and was to follow up in 4 weeks. Left message for patient to call back as soon as possible. Indicated the importance of follow up.    Bhavin Lopez, Pharm D, [facility]  8/4/2021 8:51 AM

## 2021-08-11 ENCOUNTER — TELEPHONE (OUTPATIENT)
Dept: PHARMACY | Age: 86
End: 2021-08-11

## 2021-08-11 NOTE — TELEPHONE ENCOUNTER
Patient's daughter called to cancel appt today due to having to stay over at work and not being able to find anyone to bring patient to this appt. Expressed understanding about daughter's situation but also need to monitor patient effectively as we have not seen her since 6/18/21. Daughter states [facility] are best so appt rescheduled for 8/13. Dianne Long RPH,Pharm. D,, BCPS, CACP  8/11/2021  7:47 AM

## 2021-08-13 ENCOUNTER — HOSPITAL ENCOUNTER (OUTPATIENT)
Dept: PHARMACY | Age: 86
Setting detail: THERAPIES SERIES
Discharge: HOME | End: 2021-08-13
Payer: COMMERCIAL

## 2021-08-13 DIAGNOSIS — I48.20 CHRONIC ATRIAL FIBRILLATION (HCC): Primary | ICD-10-CM

## 2021-08-13 LAB
INR BLD: 2.5
PROTIME: 29.8 SECONDS

## 2021-08-13 PROCEDURE — 99211 OFF/OP EST MAY X REQ PHY/QHP: CPT

## 2021-08-13 PROCEDURE — 85610 PROTHROMBIN TIME: CPT

## 2021-08-13 NOTE — PROGRESS NOTES
Patient seen in person in Medication Management Service. Patient's granddaughter brought her to appointment. No medication list available. Patient states compliant all of the time with regimen. No bleeding or thromboembolic side effects noted. No significant med or dietary changes. No significant recent illness or disease state changes. PT/INR done via POC meter per protocol. INR was therapeutic at 2.5.  (goal 2 - 3)    Warfarin regimen will be continued at current dose 4.5 mg Tues/Sat and 3 mg all other days. Will retest in 4 weeks. Patient understands dosing directions and information discussed. Dosing schedule and follow up appointment given to patient. Progress note routed to referring physicians office. Patient acknowledges working in [address] [place] [address] with Pharmacist as referred by his/her physician/provider. COVID 19 screening completed. At this time patient denies symptoms, recent travel and exposure. Patient educated to screen for temperature and COVID-19 symptoms prior to coming to clinic for next appointment. They are instructed to call the clinic to reschedule if they have any symptoms. Standing order for PT/INR has been placed in preparation to transition to possible remote INR monitoring given efforts to reduce the spread of COVID-19.       For Pharmacy Admin Tracking Only     Intervention Detail:    Total # of Interventions Recommended: 0   Total # of Interventions Accepted: 0   Time Spent (min): 20

## 2021-09-10 ENCOUNTER — HOSPITAL ENCOUNTER (OUTPATIENT)
Dept: PHARMACY | Age: 86
Setting detail: THERAPIES SERIES
Discharge: HOME | End: 2021-09-10
Payer: COMMERCIAL

## 2021-09-10 LAB
INR BLD: 2.5
PROTIME: 29.9 SECONDS

## 2021-09-10 PROCEDURE — 85610 PROTHROMBIN TIME: CPT

## 2021-09-10 PROCEDURE — 99211 OFF/OP EST MAY X REQ PHY/QHP: CPT

## 2021-09-10 NOTE — PROGRESS NOTES
Patient seen in person in Medication Management Service. Patient states compliant most of the time with regimen. No bleeding or thromboembolic side effects noted. No significant med or dietary changes. No significant recent illness or disease state changes. PT/INR done via POC meter per protocol. INR was therapeutic at 2.5.  (goal 2 - 3)    Warfarin regimen will be continued at current dose 4.5 mg Tues/Sat and 3 mg all other days. Will retest in 4 weeks. Patient understands dosing directions and information discussed. Dosing schedule and follow up appointment given to patient. Progress note routed to referring physicians office. Patient acknowledges working in [address] [place] [address] with Pharmacist as referred by his/her physician/provider. COVID 19 screening completed. At this time patient denies symptoms, recent travel and exposure. Patient educated to screen for temperature and COVID-19 symptoms prior to coming to clinic for next appointment. They are instructed to call the clinic to reschedule if they have any symptoms. Standing order for PT/INR has been placed in preparation to transition to possible remote INR monitoring given efforts to reduce the spread of COVID-19.       For Pharmacy Admin Tracking Only     Intervention Detail:    Total # of Interventions Recommended: 0   Total # of Interventions Accepted: 0   Time Spent (min): 20

## 2021-10-15 ENCOUNTER — HOSPITAL ENCOUNTER (OUTPATIENT)
Dept: PHARMACY | Age: 86
Setting detail: THERAPIES SERIES
Discharge: HOME | End: 2021-10-15
Payer: COMMERCIAL

## 2021-10-15 LAB
INR BLD: 3.2
PROTIME: 38.2 SECONDS

## 2021-10-15 PROCEDURE — 85610 PROTHROMBIN TIME: CPT

## 2021-10-15 PROCEDURE — 99211 OFF/OP EST MAY X REQ PHY/QHP: CPT

## 2021-10-15 NOTE — PROGRESS NOTES
Patient seen in person in Medication Management Service. Patient states compliant most of the time with regimen. No bleeding or thromboembolic side effects noted. No significant med or dietary changes. No significant recent illness or disease state changes. PT/INR done via POC meter per protocol. INR was supratherapeutic at 3.2.  (goal 2 - 3)  The patient's family helps her take her medication  Two days ago , she was given her warfarin dose by two different family members for a total of 6 mg that day    Warfarin regimen will be continued at current dose 4.5 mg Tues/Sat and 3 mg all other days. Will retest in 4 weeks. Patient understands dosing directions and information discussed. Dosing schedule and follow up appointment given to patient. Progress note routed to referring physicians office. Patient acknowledges working in [address] [place] [address] with Pharmacist as referred by his/her physician/provider. COVID 19 screening completed. At this time patient denies symptoms, recent travel and exposure. Patient educated to screen for temperature and COVID-19 symptoms prior to coming to clinic for next appointment. They are instructed to call the clinic to reschedule if they have any symptoms. Standing order for PT/INR has been placed in preparation to transition to possible remote INR monitoring given efforts to reduce the spread of COVID-19.       For Pharmacy Admin Tracking Only     Intervention Detail:    Total # of Interventions Recommended: 0   Total # of Interventions Accepted: 0   Time Spent (min): 20

## 2021-11-12 ENCOUNTER — TELEPHONE (OUTPATIENT)
Dept: PHARMACY | Age: 86
End: 2021-11-12

## 2021-11-12 NOTE — TELEPHONE ENCOUNTER
Received call from Patient's daughter Kristi Davenport that her mother does not want to leave the house today as too cold  Discussed several options for patient:  [facility] Home Draw with results sent to her physician  The patient could get her own [address] with results called to her physician  Consider alternative agents: Xarelto and elliquis  The daughter will speak with her mother's physician  The appt with our clinic was rescheduled in one week

## 2021-11-19 ENCOUNTER — HOSPITAL ENCOUNTER (OUTPATIENT)
Dept: PHARMACY | Age: 86
Setting detail: THERAPIES SERIES
Discharge: HOME | End: 2021-11-19
Payer: COMMERCIAL

## 2021-11-19 DIAGNOSIS — I48.20 CHRONIC ATRIAL FIBRILLATION (HCC): Primary | ICD-10-CM

## 2021-11-19 LAB
INR BLD: 2.5
PROTIME: 30.6 SECONDS

## 2021-11-19 PROCEDURE — 85610 PROTHROMBIN TIME: CPT

## 2021-11-19 PROCEDURE — 99211 OFF/OP EST MAY X REQ PHY/QHP: CPT

## 2021-11-19 NOTE — PROGRESS NOTES
Patient seen in person in Medication Management Service. Patient states compliant all of the time with regimen. No bleeding or thromboembolic side effects noted. No significant med or dietary changes. No significant recent illness or disease state changes. PT/INR done via POC meter per protocol. INR was therapeutic at 2.5.  (goal 2 - 3)    Warfarin regimen will be continued at current dose 4.5 mg Tues/Sat and 3 mg all other days. Will retest in 4 weeks. Patient understands dosing directions and information discussed. Dosing schedule and follow up appointment given to patient. Progress note routed to referring physicians office. 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 COVID 19 screening completed. At this time patient denies symptoms, recent travel and exposure. Patient educated to screen for temperature and COVID-19 symptoms prior to coming to clinic for next appointment. They are instructed to call the clinic to reschedule if they have any symptoms. Standing order for PT/INR has been placed in preparation to transition to possible remote INR monitoring given efforts to reduce the spread of COVID-19.       For Pharmacy Admin Tracking Only     Intervention Detail:    Total # of Interventions Recommended: 0   Total # of Interventions Accepted: 0   Time Spent (min): 20

## 2021-12-17 ENCOUNTER — HOSPITAL ENCOUNTER (OUTPATIENT)
Dept: PHARMACY | Age: 86
Setting detail: THERAPIES SERIES
Discharge: HOME | End: 2021-12-17
Payer: COMMERCIAL

## 2021-12-17 LAB
INR BLD: 2.4
PROTIME: 29.1 SECONDS

## 2021-12-17 PROCEDURE — 85610 PROTHROMBIN TIME: CPT

## 2021-12-17 PROCEDURE — 99211 OFF/OP EST MAY X REQ PHY/QHP: CPT

## 2021-12-17 NOTE — PROGRESS NOTES
Patient seen in person in Medication Management Service. Patient states compliant most of the time with regimen. No bleeding or thromboembolic side effects noted. No significant med or dietary changes. No significant recent illness or disease state changes. PT/INR done via POC meter per protocol. INR was therapeutic at 2.4.  (goal 2 - 3)    Warfarin regimen will be continued at current dose 4.5 mg Tues/Sat and 3 mg all other days. Will retest in 4 weeks. Patient understands dosing directions and information discussed. Dosing schedule and follow up appointment given to patient. Progress note routed to referring physicians office. 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 COVID 19 screening completed. At this time patient denies symptoms, recent travel and exposure. Patient educated to screen for temperature and COVID-19 symptoms prior to coming to clinic for next appointment. They are instructed to call the clinic to reschedule if they have any symptoms. Standing order for PT/INR has been placed in preparation to transition to possible remote INR monitoring given efforts to reduce the spread of COVID-19.       For Pharmacy Admin Tracking Only     Intervention Detail:    Total # of Interventions Recommended: 0   Total # of Interventions Accepted: 0   Time Spent (min): 20

## 2022-01-14 ENCOUNTER — HOSPITAL ENCOUNTER (OUTPATIENT)
Dept: PHARMACY | Age: 87
Setting detail: THERAPIES SERIES
Discharge: HOME | End: 2022-01-14
Payer: COMMERCIAL

## 2022-01-14 DIAGNOSIS — I48.20 CHRONIC ATRIAL FIBRILLATION (HCC): Primary | ICD-10-CM

## 2022-01-14 LAB
INR BLD: 2.5
PROTIME: 30.2 SECONDS

## 2022-01-14 PROCEDURE — 85610 PROTHROMBIN TIME: CPT

## 2022-01-14 PROCEDURE — 99211 OFF/OP EST MAY X REQ PHY/QHP: CPT

## 2022-01-14 NOTE — PROGRESS NOTES
Patient seen in person in Medication Management Service. Patient states compliant all of the time with regimen. No bleeding or thromboembolic side effects noted. No significant dietary changes. No significant disease state changes. Patient did take 2 doses of Robitussin last week. Felt better next day. PT/INR done via POC meter per protocol. INR was therapeutic at 2.5.  (goal 2 - 3)    Warfarin regimen will be continued at current dose 4.5 mg Tues/Sat and 3 mg all other days. Will retest in 4 weeks. Patient understands dosing directions and information discussed. Dosing schedule and follow up appointment given to patient. Progress note routed to referring physicians office. Patient acknowledges working in [address] [place] [address] with Pharmacist as referred by his/her physician/provider. COVID 19 screening completed. At this time patient denies symptoms, recent travel and exposure. Patient educated to screen for temperature and COVID-19 symptoms prior to coming to clinic for next appointment. They are instructed to call the clinic to reschedule if they have any symptoms. Standing order for PT/INR has been placed in preparation to transition to possible remote INR monitoring given efforts to reduce the spread of COVID-19.       For Pharmacy Admin Tracking Only     Intervention Detail:    Total # of Interventions Recommended: 0   Total # of Interventions Accepted: 0   Time Spent (min): 20

## 2022-02-11 ENCOUNTER — HOSPITAL ENCOUNTER (OUTPATIENT)
Dept: PHARMACY | Age: 87
Setting detail: THERAPIES SERIES
Discharge: HOME | End: 2022-02-11
Payer: COMMERCIAL

## 2022-02-11 LAB
INR BLD: 2.3
PROTIME: 27.4 SECONDS

## 2022-02-11 PROCEDURE — 99211 OFF/OP EST MAY X REQ PHY/QHP: CPT

## 2022-02-11 PROCEDURE — 85610 PROTHROMBIN TIME: CPT

## 2022-02-11 NOTE — PROGRESS NOTES
Patient seen in person in Medication Management Service. Patient states compliant most of the time with regimen. No bleeding or thromboembolic side effects noted. No significant med or dietary changes. No significant recent illness or disease state changes. PT/INR done via POC meter per protocol. INR was therapeutic at 2.3.  (goal 2 - 3)    Warfarin regimen will be continued at current dose 4.5 mg Tues/Sat and 3 mg all other days. Will retest in 4 weeks. Patient understands dosing directions and information discussed. Dosing schedule and follow up appointment given to patient. Progress note routed to referring physicians office. Patient acknowledges working in [address] [place] [address] with Pharmacist as referred by his/her physician/provider. COVID 19 screening completed. At this time patient denies symptoms, recent travel and exposure. Patient educated to screen for temperature and COVID-19 symptoms prior to coming to clinic for next appointment. They are instructed to call the clinic to reschedule if they have any symptoms. Standing order for PT/INR has been placed in preparation to transition to possible remote INR monitoring given efforts to reduce the spread of COVID-19.       For Pharmacy Admin Tracking Only     Intervention Detail:    Total # of Interventions Recommended: 0   Total # of Interventions Accepted: 0   Time Spent (min): 20

## 2022-03-11 ENCOUNTER — TELEPHONE (OUTPATIENT)
Dept: PHARMACY | Age: 87
End: 2022-03-11

## 2022-03-11 NOTE — TELEPHONE ENCOUNTER
Patient's daughter called to cancel appointment for INR check in the Medication management clinic. Patient's brother passed away last night and she is not feeling up to leaving the house. Appointment rescheduled for next Friday so that daughter can bring her.    David Arce, Pharm D, [facility]  3/11/2022 9:14 AM

## 2022-03-18 ENCOUNTER — HOSPITAL ENCOUNTER (OUTPATIENT)
Dept: PHARMACY | Age: 87
Setting detail: THERAPIES SERIES
Discharge: HOME | End: 2022-03-18
Payer: COMMERCIAL

## 2022-03-18 LAB
INR BLD: 3.1
PROTIME: 37.8 SECONDS

## 2022-03-18 PROCEDURE — 85610 PROTHROMBIN TIME: CPT

## 2022-03-18 PROCEDURE — 99212 OFFICE O/P EST SF 10 MIN: CPT

## 2022-03-18 NOTE — PROGRESS NOTES
Patient seen in person in Medication Management Service. Patient states compliant most of the time with regimen. No bleeding or thromboembolic side effects noted. No significant med or dietary changes. No significant recent illness or disease state changes. PT/INR done via POC meter per protocol. INR was supratherapeutic at 3.1.  (goal 2 - 3)  The patient did take Pepto-Bismol for Gastric Upset this past week  She is feeling better  The patient does take one Aleve 2-3 x weekly for leg pain    Warfarin regimen will be continued at current dose 4.5 mg Tues/Sat and 3 mg all other days. Will retest in 3 weeks. Patient understands dosing directions and information discussed. Dosing schedule and follow up appointment given to patient. Progress note routed to referring physicians office. Patient acknowledges working in [address] [place] [address] with Pharmacist as referred by his/her physician/provider. COVID 19 screening completed. At this time patient denies symptoms, recent travel and exposure. Patient educated to screen for temperature and COVID-19 symptoms prior to coming to clinic for next appointment. They are instructed to call the clinic to reschedule if they have any symptoms. Standing order for PT/INR has been placed in preparation to transition to possible remote INR monitoring given efforts to reduce the spread of COVID-19.       For Pharmacy Admin Tracking Only     Intervention Detail:    Total # of Interventions Recommended: 0   Total # of Interventions Accepted: 0   Time Spent (min): 20

## 2022-04-08 ENCOUNTER — HOSPITAL ENCOUNTER (OUTPATIENT)
Dept: PHARMACY | Age: 87
Setting detail: THERAPIES SERIES
Discharge: HOME | End: 2022-04-08
Payer: COMMERCIAL

## 2022-04-08 LAB
INR BLD: 3.4
PROTIME: 41.1 SECONDS

## 2022-04-08 PROCEDURE — 85610 PROTHROMBIN TIME: CPT

## 2022-04-08 PROCEDURE — 99212 OFFICE O/P EST SF 10 MIN: CPT

## 2022-04-08 NOTE — PROGRESS NOTES
Patient seen in person in Medication Management Service. Patient states compliant most of the time with regimen. No bleeding or thromboembolic side effects noted. No significant med or dietary changes. No significant recent illness or disease state changes. PT/INR done via POC meter per protocol. INR was supratherapeutic at 3.4.  (goal 2 - 3)  The patient did take Pepto-Bismol x 1 this past week    Warfarin regimen will be decreased to 4.5 mg Tues and 3 mg all other days. Will retest in 2 weeks. Patient understands dosing directions and information discussed. Dosing schedule and follow up appointment given to patient. Progress note routed to referring physicians office. Patient acknowledges working in [address] [place] [address] with Pharmacist as referred by his/her physician/provider. COVID 19 screening completed. At this time patient denies symptoms, recent travel and exposure. Patient educated to screen for temperature and COVID-19 symptoms prior to coming to clinic for next appointment. They are instructed to call the clinic to reschedule if they have any symptoms. Standing order for PT/INR has been placed in preparation to transition to possible remote INR monitoring given efforts to reduce the spread of COVID-19.       For Pharmacy Admin Tracking Only     Intervention Detail: Dose Adjustment: 1, reason: Therapy De-escalation   Total # of Interventions Recommended: 1   Total # of Interventions Accepted: 1   Time Spent (min): 20

## 2022-04-22 ENCOUNTER — HOSPITAL ENCOUNTER (OUTPATIENT)
Dept: PHARMACY | Age: 87
Setting detail: THERAPIES SERIES
Discharge: HOME | End: 2022-04-22
Payer: COMMERCIAL

## 2022-04-22 DIAGNOSIS — I48.20 CHRONIC ATRIAL FIBRILLATION (HCC): Primary | ICD-10-CM

## 2022-04-22 LAB
INR BLD: 3.1
PROTIME: 37.1 SECONDS

## 2022-04-22 PROCEDURE — 99212 OFFICE O/P EST SF 10 MIN: CPT

## 2022-04-22 PROCEDURE — 85610 PROTHROMBIN TIME: CPT

## 2022-04-22 NOTE — PROGRESS NOTES
Patient seen in person in Medication Management Service. Patient states compliant all of the time with regimen. No bleeding side effects noted. No significant dietary changes. Eating spinach about once a week. Discussed consistency. No significant recent illness or disease state changes. Patient did fall last week. Patient has been taking Aleve twice daily for the past week. Can't take Tylenol. Recommended using the least amount of Aleve for the shortest time possible. Patient has Ultram (daughter wants us to refer to it as Ultram and not tramadol so patient will take it). Daughter plans to cut back Aleve as she did not know patient was taking that much Aleve and use the Ultram instead. Patient had 2nd COVID booster a week or two ago. PT/INR done via POC meter per protocol. INR was supratherapeutic at 3.1.  (goal 2 - 3)    Daughter cutting back Aleve and using Ultram instead. Warfarin regimen will be continued at current dose 4.5 mg Tues and 3 mg all other days. Will retest in 2 weeks. Patient understands dosing directions and information discussed. Dosing schedule and follow up appointment given to patient. Progress note routed to referring physicians office. Patient acknowledges working in [address] [place] [address] with Pharmacist as referred by his/her physician/provider. COVID 19 screening completed. At this time patient denies symptoms, recent travel and exposure. Patient educated to screen for temperature and COVID-19 symptoms prior to coming to clinic for next appointment. They are instructed to call the clinic to reschedule if they have any symptoms. Standing order for PT/INR has been placed in preparation to transition to possible remote INR monitoring given efforts to reduce the spread of COVID-19.       For Pharmacy Admin Tracking Only     Intervention Detail: Adherence Monitorin   Total # of Interventions Recommended: 1   Total # of Interventions Accepted: 1   Time Spent (min): 20

## 2022-04-27 ENCOUNTER — APPOINTMENT (OUTPATIENT)
Dept: CT IMAGING | Age: 87
End: 2022-04-27
Payer: COMMERCIAL

## 2022-04-27 ENCOUNTER — HOSPITAL ENCOUNTER (EMERGENCY)
Age: 87
Discharge: HOME | End: 2022-04-27
Attending: STUDENT IN AN ORGANIZED HEALTH CARE EDUCATION/TRAINING PROGRAM
Payer: COMMERCIAL

## 2022-04-27 ENCOUNTER — APPOINTMENT (OUTPATIENT)
Dept: GENERAL RADIOLOGY | Age: 87
End: 2022-04-27
Payer: COMMERCIAL

## 2022-04-27 VITALS
HEART RATE: 65 BPM | SYSTOLIC BLOOD PRESSURE: 144 MMHG | OXYGEN SATURATION: 96 % | WEIGHT: 125 LBS | HEIGHT: 59 IN | RESPIRATION RATE: 14 BRPM | DIASTOLIC BLOOD PRESSURE: 104 MMHG | BODY MASS INDEX: 25.2 KG/M2 | TEMPERATURE: 98 F

## 2022-04-27 DIAGNOSIS — R41.82 ALTERED MENTAL STATUS, UNSPECIFIED ALTERED MENTAL STATUS TYPE: ICD-10-CM

## 2022-04-27 DIAGNOSIS — N30.00 ACUTE CYSTITIS WITHOUT HEMATURIA: Primary | ICD-10-CM

## 2022-04-27 LAB
-: ABNORMAL
ABSOLUTE EOS #: 0.2 K/UL (ref 0–0.4)
ABSOLUTE LYMPH #: 2.3 K/UL (ref 1–4.8)
ABSOLUTE MONO #: 0.7 K/UL (ref 0.1–1.2)
ALBUMIN SERPL-MCNC: 3.9 G/DL (ref 3.5–5.2)
ALBUMIN/GLOBULIN RATIO: 1 (ref 1–2.5)
ALP BLD-CCNC: 272 U/L (ref 35–104)
ALT SERPL-CCNC: 11 U/L (ref 5–33)
ANION GAP SERPL CALCULATED.3IONS-SCNC: 12 MMOL/L (ref 9–17)
AST SERPL-CCNC: 19 U/L
BACTERIA: ABNORMAL
BASOPHILS # BLD: 1 % (ref 0–2)
BASOPHILS ABSOLUTE: 0.1 K/UL (ref 0–0.2)
BILIRUB SERPL-MCNC: 0.3 MG/DL (ref 0.3–1.2)
BILIRUBIN URINE: NEGATIVE
BUN BLDV-MCNC: 21 MG/DL (ref 8–23)
CALCIUM SERPL-MCNC: 9.2 MG/DL (ref 8.6–10.4)
CASTS UA: ABNORMAL /LPF
CASTS UA: ABNORMAL /LPF
CHLORIDE BLD-SCNC: 100 MMOL/L (ref 98–107)
CO2: 23 MMOL/L (ref 20–31)
COLOR: YELLOW
CREAT SERPL-MCNC: 0.83 MG/DL (ref 0.5–0.9)
EOSINOPHILS RELATIVE PERCENT: 3 % (ref 1–4)
EPITHELIAL CELLS UA: ABNORMAL /HPF (ref 0–5)
GFR AFRICAN AMERICAN: >60 ML/MIN
GFR NON-AFRICAN AMERICAN: >60 ML/MIN
GFR SERPL CREATININE-BSD FRML MDRD: ABNORMAL ML/MIN/{1.73_M2}
GLUCOSE BLD-MCNC: 111 MG/DL (ref 70–99)
GLUCOSE URINE: NEGATIVE
HCT VFR BLD CALC: 39.1 % (ref 36–46)
HEMOGLOBIN: 13 G/DL (ref 12–16)
INR BLD: 2.7
KETONES, URINE: NEGATIVE
LEUKOCYTE ESTERASE, URINE: ABNORMAL
LYMPHOCYTES # BLD: 36 % (ref 24–44)
MCH RBC QN AUTO: 31.9 PG (ref 26–34)
MCHC RBC AUTO-ENTMCNC: 33.3 G/DL (ref 31–37)
MCV RBC AUTO: 95.8 FL (ref 80–100)
MONOCYTES # BLD: 11 % (ref 2–11)
MUCUS: ABNORMAL
NITRITE, URINE: NEGATIVE
OTHER OBSERVATIONS UA: ABNORMAL
PDW BLD-RTO: 13.5 % (ref 12.5–15.4)
PH UA: 5 (ref 5–8)
PLATELET # BLD: 202 K/UL (ref 140–450)
PMV BLD AUTO: 7.6 FL (ref 6–12)
POTASSIUM SERPL-SCNC: 4.2 MMOL/L (ref 3.7–5.3)
PROTEIN UA: ABNORMAL
PROTHROMBIN TIME: 26 SEC (ref 9.4–12.6)
RBC # BLD: 4.08 M/UL (ref 4–5.2)
RBC UA: ABNORMAL /HPF (ref 0–2)
SEG NEUTROPHILS: 49 % (ref 36–66)
SEGMENTED NEUTROPHILS ABSOLUTE COUNT: 3.2 K/UL (ref 1.8–7.7)
SODIUM BLD-SCNC: 135 MMOL/L (ref 135–144)
SPECIFIC GRAVITY UA: 1.02 (ref 1–1.03)
TOTAL PROTEIN: 7.8 G/DL (ref 6.4–8.3)
TROPONIN, HIGH SENSITIVITY: 12 NG/L (ref 0–14)
TURBIDITY: ABNORMAL
URINE HGB: NEGATIVE
UROBILINOGEN, URINE: NORMAL
WBC # BLD: 6.5 K/UL (ref 3.5–11)
WBC UA: ABNORMAL /HPF (ref 0–5)

## 2022-04-27 PROCEDURE — 84484 ASSAY OF TROPONIN QUANT: CPT

## 2022-04-27 PROCEDURE — 85610 PROTHROMBIN TIME: CPT

## 2022-04-27 PROCEDURE — 70450 CT HEAD/BRAIN W/O DYE: CPT

## 2022-04-27 PROCEDURE — 99285 EMERGENCY DEPT VISIT HI MDM: CPT

## 2022-04-27 PROCEDURE — 93005 ELECTROCARDIOGRAM TRACING: CPT | Performed by: STUDENT IN AN ORGANIZED HEALTH CARE EDUCATION/TRAINING PROGRAM

## 2022-04-27 PROCEDURE — 80053 COMPREHEN METABOLIC PANEL: CPT

## 2022-04-27 PROCEDURE — 71045 X-RAY EXAM CHEST 1 VIEW: CPT

## 2022-04-27 PROCEDURE — 85025 COMPLETE CBC W/AUTO DIFF WBC: CPT

## 2022-04-27 PROCEDURE — 6370000000 HC RX 637 (ALT 250 FOR IP): Performed by: STUDENT IN AN ORGANIZED HEALTH CARE EDUCATION/TRAINING PROGRAM

## 2022-04-27 PROCEDURE — 36415 COLL VENOUS BLD VENIPUNCTURE: CPT

## 2022-04-27 PROCEDURE — 81001 URINALYSIS AUTO W/SCOPE: CPT

## 2022-04-27 RX ORDER — NITROFURANTOIN 25; 75 MG/1; MG/1
100 CAPSULE ORAL ONCE
Status: COMPLETED | OUTPATIENT
Start: 2022-04-27 | End: 2022-04-27

## 2022-04-27 RX ORDER — NITROFURANTOIN 25; 75 MG/1; MG/1
100 CAPSULE ORAL 2 TIMES DAILY
Qty: 14 CAPSULE | Refills: 0 | Status: SHIPPED | OUTPATIENT
Start: 2022-04-27 | End: 2022-05-04

## 2022-04-27 RX ADMIN — NITROFURANTOIN (MONOHYDRATE/MACROCRYSTALS) 100 MG: 75; 25 CAPSULE ORAL at 23:00

## 2022-04-28 ENCOUNTER — TELEPHONE (OUTPATIENT)
Dept: PHARMACY | Age: 87
End: 2022-04-28

## 2022-04-28 LAB
EKG ATRIAL RATE: 49 BPM
EKG P AXIS: 44 DEGREES
EKG P-R INTERVAL: 186 MS
EKG Q-T INTERVAL: 416 MS
EKG QRS DURATION: 82 MS
EKG QTC CALCULATION (BAZETT): 375 MS
EKG R AXIS: -10 DEGREES
EKG T AXIS: 73 DEGREES
EKG VENTRICULAR RATE: 49 BPM

## 2022-04-28 ASSESSMENT — ENCOUNTER SYMPTOMS
SORE THROAT: 0
SHORTNESS OF BREATH: 0
VOMITING: 0
BACK PAIN: 1
ABDOMINAL PAIN: 0
BLOOD IN STOOL: 0
DIARRHEA: 0
COUGH: 0
FACIAL SWELLING: 0
WHEEZING: 0
NAUSEA: 0

## 2022-04-28 NOTE — ED PROVIDER NOTES
[address] [facility] ED  EMERGENCY DEPARTMENT ENCOUNTER      Pt Name: Allyson Grande  MRN: [id number]  [place] 7/30/1932  Date of evaluation: 4/27/2022  Provider: Juanis Lizarraga, [address] [place] [address],[address]       Chief Complaint   Patient presents with    Altered Mental Status    Back Pain         HISTORY OF PRESENT ILLNESS   (Location/Symptom, Timing/Onset, Context/Setting, Quality, Duration, Modifying Factors, Severity)  Note limiting factors. Allyson Grande is a 80 y.o. female who presents to the emergency department for confusion and hallucinations as well as back pain. Family states that she does have a history of dementia and has had hallucinations and sundowning in the past but states that today her confusion was worse than normal.  Patient states that she feels well right now and does not have any confusion or hallucinations. Family also notes that she was having some lower back discomfort although it was not bothering her too much as she was very active in cooking and cleaning and walking around the house. They are mostly concerned about a possible urinary tract infection. They note that she did fall from standing onto her back 2 weeks ago but has not been having any problems or symptoms since then. She is on Coumadin for atrial fibrillation. Denies any head trauma. HPI    Nursing Notes were reviewed. REVIEW OF SYSTEMS    (2-9 systems for level 4, 10 or more for level 5)     Review of Systems   Constitutional: Negative for chills and fever. HENT: Negative for congestion, facial swelling and sore throat. Eyes: Negative for visual disturbance. Respiratory: Negative for cough, shortness of breath and wheezing. Cardiovascular: Negative for chest pain, palpitations and leg swelling. Gastrointestinal: Negative for abdominal pain, blood in stool, diarrhea, nausea and vomiting. Genitourinary: Negative for dysuria and hematuria. Musculoskeletal: Positive for back pain. Negative for neck pain. Skin: Negative for rash. Neurological: Negative for syncope, weakness, numbness and headaches. Hematological: Does not bruise/bleed easily. Psychiatric/Behavioral:        Positive for intermittent confusion and hallucinations often at nighttime. Except as noted above the remainder of the review of systems was reviewed and negative.        PAST MEDICAL HISTORY     Past Medical History:   Diagnosis Date    Atrial fibrillation 3/28/2014    Back pain, chronic 6/1/2015    Dementia (HCC)     GERD (gastroesophageal reflux disease)     Hiatal hernia     History of breast cancer 12/1/2015    History of glaucoma     Hypertension, benign essential, goal below 140/90 6/1/2015    Hypothyroid 6/1/2015         SURGICAL HISTORY       Past Surgical History:   Procedure Laterality Date    BLADDER REPAIR      BREAST SURGERY      left mastectomy    CATARACT REMOVAL WITH IMPLANT      CHOLECYSTECTOMY           CURRENT MEDICATIONS       Discharge Medication List as of 4/27/2022 10:45 PM      CONTINUE these medications which have NOT CHANGED    Details   traMADol (ULTRAM) 50 MG tablet TAKE 1 TABLET BY MOUTH EVERY 8 HOURS AS NEEDED FOR PAIN, REDUCE DOSES TAKEN AS PAIN BECOMES MANAGEABLE, Disp-90 tablet, R-0Normal      ascorbic acid (VITAMIN C) 500 MG tablet TAKE 1 TABLET BY MOUTH EVERY DAY, Disp-30 tablet, R-11Normal      atenolol (TENORMIN) 50 MG tablet TAKE 1 TABLET BY MOUTH EVERY DAY, Disp-30 tablet, R-11Normal      donepezil (ARICEPT) 5 MG tablet TAKE (1) TABLET BY MOUTH NIGHTLY, Disp-30 tablet, R-11Normal      amLODIPine (NORVASC) 5 MG tablet TAKE 1 TABLET BY MOUTH EVERY DAY, Disp-30 tablet, R-11Normal      doxazosin (CARDURA) 1 MG tablet TAKE 1 TABLET BY MOUTH EVERY DAY, Disp-30 tablet, R-11Normal      ferrous sulfate (FEROSUL) 325 (65 Fe) MG tablet TAKE 1 TABLET BY MOUTH DAILY WITH BREAKFAST, Disp-90 tablet, R-3Normal      potassium chloride (MICRO-K) 10 MEQ extended release capsule TAKE 1 CAPSULE BY MOUTH TWICE DAILY WITH FOOD, Disp-180 capsule, R-3Normal      levothyroxine (SYNTHROID) 100 MCG tablet TAKE 1 TABLET BY MOUTH ONCE DAILY, Disp-90 tablet, R-3Normal      omeprazole (PRILOSEC) 20 MG delayed release capsule TAKE 1 CAPSULE BY MOUTH ONCE DAILY BEFORE A MEAL, Disp-90 capsule, R-3Normal      warfarin (COUMADIN) 3 MG tablet Take 1 tablet by mouth daily 4.5 mg Tue/Sat and 3 mg al other days or as managed by [facility], Disp-135 tablet, R-3Normal      mirtazapine (REMERON) 15 MG tablet TAKE 1 TABLET BY MOUTH NIGHTLY, Disp-90 tablet, R-3Normal      !! Multiple Vitamins-Minerals (PRESERVISION AREDS 2) CAPS Take 1 capsule by mouth daily, Disp-100 capsule, R-2NO PRINT      !! Multiple Vitamins-Minerals (ICAPS AREDS 2) CAPS Take 1 tablet by mouth 2 times daily Historical Med      timolol (TIMOPTIC) 0.5 % ophthalmic solution instill 1 drop into both eyes twice a day, R-0      latanoprost (XALATAN) 0.005 % ophthalmic solution Apply to eye daily, R-1       !! - Potential duplicate medications found. Please discuss with provider.           ALLERGIES     Latex, Penicillins, and Tylenol [acetaminophen]    FAMILY HISTORY       Family History   Problem Relation Age of Onset    No Known Problems Mother     No Known Problems Father           SOCIAL HISTORY       Social History     Socioeconomic History    Marital status:      Spouse name: None    Number of children: None    Years of education: None    Highest education level: None   Occupational History    None   Tobacco Use    Smoking status: Never Smoker    Smokeless tobacco: Never Used   Substance and Sexual Activity    Alcohol use: No     Alcohol/week: 0.0 standard drinks    Drug use: No    Sexual activity: None   Other Topics Concern    None   Social History Narrative    None     Social Determinants of Health     Financial Resource Strain:     Difficulty of Paying Living Expenses: Not on file   Food Insecurity:     Worried About Running Out of Food in the Last Year: Not on file    Ran Out of Food in the Last Year: Not on file   Transportation Needs:     Lack of Transportation (Medical): Not on file    Lack of Transportation (Non-Medical): Not on file   Physical Activity: Inactive    Days of Exercise per Week: 0 days    Minutes of Exercise per Session: 0 min   Stress:     Feeling of Stress : Not on file   Social Connections:     Frequency of Communication with Friends and Family: Not on file    Frequency of Social Gatherings with Friends and Family: Not on file    Attends Muslim Services: Not on file    Active Member of [address] [place] [address] [employer] or Organizations: Not on file    Attends Club or Organization Meetings: Not on file    Marital Status: Not on file   Intimate Partner Violence:     Fear of Current or Ex-Partner: Not on file    Emotionally Abused: Not on file    Physically Abused: Not on file    Sexually Abused: Not on file   Housing Stability:     Unable to Pay for Housing in the Last Year: Not on file    Number of [place] in the Last Year: Not on file    Unstable Housing in the Last Year: Not on file       SCREENINGS        [place] Coma Scale  Eye Opening: Spontaneous  Best Verbal Response: Oriented  Best Motor Response: Obeys commands  [place] Coma Scale Score: 15               PHYSICAL EXAM    (up to 7 for level 4, 8 or more for level 5)     ED Triage Vitals [04/27/22 2111]   BP Temp Temp Source Pulse Resp SpO2 Height Weight   (!) 144/104 98 °F (36.7 °C) Oral 65 14 96 % 4' 11\" (1.499 m) 125 lb (56.7 kg)       Physical Exam  Vitals and nursing note reviewed. Constitutional:       General: She is not in acute distress. Appearance: Normal appearance. She is not ill-appearing, toxic-appearing or diaphoretic. HENT:      Head: Normocephalic and atraumatic. Mouth/Throat:      Mouth: Mucous membranes are moist.      Pharynx: Oropharynx is clear. Eyes:      Extraocular Movements: Extraocular movements intact.       Pupils: Pupils are equal, round, and reactive to light. Cardiovascular:      Rate and Rhythm: Normal rate and regular rhythm. Heart sounds: No murmur heard. No gallop. Pulmonary:      Effort: Pulmonary effort is normal. No respiratory distress. Breath sounds: Normal breath sounds. No wheezing or rales. Abdominal:      General: There is no distension. Palpations: Abdomen is soft. Tenderness: There is no abdominal tenderness. There is no guarding. Musculoskeletal:         General: No tenderness. Cervical back: Normal range of motion and neck supple. Right lower leg: No edema. Left lower leg: No edema. Comments: No tenderness over the back or spine. Skin:     General: Skin is warm and dry. Findings: No rash. Neurological:      General: No focal deficit present. Mental Status: She is alert and oriented to person, place, and time. Mental status is at baseline. Cranial Nerves: No cranial nerve deficit. Sensory: No sensory deficit. Motor: No weakness. Coordination: Coordination normal.         DIAGNOSTIC RESULTS     EKG: All EKG's are interpreted by the Emergency Department Physician who either signs or Co-signs this chart in the absence of a cardiologist.    EKG Interpretation    Interpreted by me    Rhythm: normal sinus   Rate: Bradycardic rate of 49  Axis: normal  Ectopy: none  Conduction: normal  ST Segments: no acute change  T Waves: no acute change  Q Waves: none    Clinical Impression: Sinus bradycardia    RADIOLOGY:   Non-plain film images such as CT, Ultrasound and MRI are read by the radiologist. Plain radiographic images are visualized and preliminarily interpreted by the emergency physician with the below findings:        Interpretation per the Radiologist below, if available at the time of this note:    CT HEAD WO CONTRAST   Final Result   Chronic involutional changes. No acute disease.          XR CHEST PORTABLE   Final Result   No acute disease               ED BEDSIDE ULTRASOUND:   Performed by ED Physician - none    LABS:  Labs Reviewed   URINALYSIS WITH REFLEX TO CULTURE - Abnormal; Notable for the following components:       Result Value    Turbidity UA SLIGHTLY CLOUDY (*)     Protein, UA 1+ (*)     Leukocyte Esterase, Urine TRACE (*)     All other components within normal limits   COMPREHENSIVE METABOLIC PANEL - Abnormal; Notable for the following components:    Glucose 111 (*)     Alkaline Phosphatase 272 (*)     All other components within normal limits   PROTIME-INR - Abnormal; Notable for the following components:    Protime 26.0 (*)     All other components within normal limits   MICROSCOPIC URINALYSIS - Abnormal; Notable for the following components:    Bacteria, UA MODERATE (*)     Mucus, UA 1+ (*)     Other Observations UA   (*)     Value: Utilizing a urinalysis as the only screening method to exclude a potential uropathogen can be unreliable in many patient populations. Rapid screening tests are less sensitive than culture and if UTI is a clinical possibility, culture should be considered despite a negative urinalysis. All other components within normal limits   CBC WITH AUTO DIFFERENTIAL   TROPONIN       All other labs were within normal range or not returned as of this dictation. EMERGENCY DEPARTMENT COURSE and DIFFERENTIAL DIAGNOSIS/MDM:   Vitals:    Vitals:    04/27/22 2111   BP: (!) 144/104   Pulse: 65   Resp: 14   Temp: 98 °F (36.7 °C)   TempSrc: Oral   SpO2: 96%   Weight: 56.7 kg (125 lb)   Height: 4' 11\" (1.499 m)       Patient is an 79-year-old female with history of dementia as well as sundowning presenting with another episode of transient altered mental status and hallucinations as well as low back pain with family concern of urinary tract infection. On exam vitals normal patient is in no acute distress she is awake and alert and oriented and not complaining of any pain or confusion at this time.   No neurological deficits. Heart lung sounds normal, abdomen soft nontender, no back tenderness or spinal tenderness. Urinalysis and basic labs obtained including INR as the patient is on Coumadin for atrial fibrillation. CT of the head also obtained. All work-up normal except for evidence of urinary tract infection. Patient treated with Trazeem Millet and discharged home with follow-up with PCP. MDM      REASSESSMENT          CRITICAL CARE TIME       CONSULTS:  None    PROCEDURES:  Unless otherwise noted below, none     Procedures      FINAL IMPRESSION      1. Acute cystitis without hematuria    2. Altered mental status, unspecified altered mental status type          DISPOSITION/PLAN   DISPOSITION Decision To Discharge 04/27/2022 10:44:15 PM      PATIENT REFERRED TO:  Paola Ramesh, [address]  [address] [place] [address]  [phone]    Schedule an appointment as soon as possible for a visit         DISCHARGE MEDICATIONS:  Discharge Medication List as of 4/27/2022 10:45 PM      START taking these medications    Details   nitrofurantoin, macrocrystal-monohydrate, (MACROBID) 100 MG capsule Take 1 capsule by mouth 2 times daily for 7 days, Disp-14 capsule, R-0Normal           Controlled Substances Monitoring:     RX Monitoring 5/15/2019   Attestation The Prescription Monitoring Report for this patient was reviewed today.    Periodic Controlled Substance Monitoring No signs of potential drug abuse or diversion identified: otherwise, see note documentation       (Please note that portions of this note were completed with a voice recognition program.  Efforts were made to edit the dictations but occasionally words are mis-transcribed.)    Desmond Reveles DO (electronically signed)  Attending Emergency Physician            Dylan Sandoval DO  04/28/22 0142

## 2022-04-28 NOTE — ED NOTES
Pt ambulates to room- gait slow/steady. Family accompanies. Pt lives with daughter/DEBBI. Per family- pt s/f off last step landing on rear 3 weeks PTA- denies striking head/LOC. Pt c/o bilat low back/flank pain. Family reports giving aleve- PCP told them to d/c and give Ultram. Over last couple days- reported visual hallucinations. Pt denies n/v/d/dysuria. Pt AOx4. RR easy,unlabored.  PWD     Sandee Schlatter, RN  04/27/22 2484

## 2022-04-28 NOTE — TELEPHONE ENCOUNTER
Patient to ED early today for confusion and hallucinations. Also concern for UTI. UA positive for bacteria. Patient to take nitrofurantoin 100mg twice a day for 7 days which should not affect INR. ED notes also report a fall about two weeks ago hitting her head. CT done in ED was negative. INR in ED was 2.7. Next appt for patient INR follow up on 5/6/22. Dianne Long RPH,Pharm. D,, BCPS, CACP  4/28/2022  6:27 PM

## 2022-05-13 ENCOUNTER — HOSPITAL ENCOUNTER (OUTPATIENT)
Dept: PHARMACY | Age: 87
Setting detail: THERAPIES SERIES
Discharge: HOME | End: 2022-05-13
Payer: COMMERCIAL

## 2022-05-13 DIAGNOSIS — I48.20 CHRONIC ATRIAL FIBRILLATION (HCC): Primary | ICD-10-CM

## 2022-05-13 LAB
INR BLD: 3.1
PROTIME: 37.3 SECONDS

## 2022-05-13 PROCEDURE — 99212 OFFICE O/P EST SF 10 MIN: CPT

## 2022-05-13 PROCEDURE — 85610 PROTHROMBIN TIME: CPT

## 2022-05-13 NOTE — PROGRESS NOTES
Patient seen in person in Medication Management Service. Patient states compliant all of the time with regimen. No bleeding or thromboembolic side effects noted. No significant med or dietary changes. No significant recent illness or disease state changes. Patient was in [facility] ER on 4/27 for UTI. Took Macrobid for 7 days. Finished 5/3  Did cut back on the amount of Aleve she was taking. Taking Ultram for pain. PT/INR done via POC meter per protocol. INR was supratherapeutic at 3.1.  (goal 2 - 3)    Warfarin regimen will be decreased to 3 mg daily. Will retest in 3 weeks per daughter's request.    Patient understands dosing directions and information discussed. Dosing schedule and follow up appointment given to patient. Progress note routed to referring physicians office. 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 COVID 19 screening completed. At this time patient denies symptoms, recent travel and exposure. Patient educated to screen for temperature and COVID-19 symptoms prior to coming to clinic for next appointment. They are instructed to call the clinic to reschedule if they have any symptoms. Standing order for PT/INR has been placed in preparation to transition to possible remote INR monitoring given efforts to reduce the spread of COVID-19.       For Pharmacy Admin Tracking Only     Intervention Detail: Dose Adjustment: 1, reason: Therapy Optimization   Total # of Interventions Recommended: 1   Total # of Interventions Accepted: 1   Time Spent (min): 20

## 2022-06-03 ENCOUNTER — HOSPITAL ENCOUNTER (OUTPATIENT)
Dept: PHARMACY | Age: 87
Setting detail: THERAPIES SERIES
Discharge: HOME | End: 2022-06-03
Payer: COMMERCIAL

## 2022-06-03 DIAGNOSIS — I48.20 CHRONIC ATRIAL FIBRILLATION (HCC): Primary | ICD-10-CM

## 2022-06-03 LAB
INR BLD: 3.4
PROTIME: 40.8 SECONDS

## 2022-06-03 PROCEDURE — 99213 OFFICE O/P EST LOW 20 MIN: CPT

## 2022-06-03 PROCEDURE — 85610 PROTHROMBIN TIME: CPT

## 2022-06-03 NOTE — PROGRESS NOTES
Patient seen in person in Medication Management Service. Patient/Daughter states compliant all of the time with regimen. No bleeding or thromboembolic side effects noted. No significant med or dietary changes. Denies any cranberry, grapefruit, or black licorice. Has been drinking vitamin water but does not think it has cherry concentrate or cranberry in it. Using Ultram for pain. No Aleve. No significant recent illness or disease state changes. Stomach was a little upset yesterday after oatmeal but no vomiting or diarrhea. PT/INR done via POC meter per protocol. INR was supratherapeutic at 3.4.  (goal 2 - 3)    Warfarin regimen will be held for 1 day and reduced to 1.5 mg Sat and 3 mg all other days. Will retest in 2 weeks per daughter request on Friday. Patient counseled on signs/symptoms of bleeding and when to seek medical attention. Patient instructed to use extra precautions with elevated INR. Patient understands dosing directions and information discussed. Dosing schedule and follow up appointment given to patient. Progress note routed to referring physicians office. Patient acknowledges working in [address] [place] [address] with Pharmacist as referred by his/her physician/provider. COVID 19 screening completed. At this time patient denies symptoms, recent travel and exposure. Patient educated to screen for temperature and COVID-19 symptoms prior to coming to clinic for next appointment. They are instructed to call the clinic to reschedule if they have any symptoms. Standing order for PT/INR has been placed in preparation to transition to possible remote INR monitoring given efforts to reduce the spread of COVID-19.       For Pharmacy Admin Tracking Only     Intervention Detail: Dose Adjustment: 1, reason: Therapy Optimization   Total # of Interventions Recommended: 1   Total # of Interventions Accepted: 1   Time Spent (min): 20

## 2022-06-15 ENCOUNTER — TELEPHONE (OUTPATIENT)
Dept: PHARMACY | Age: 87
End: 2022-06-15

## 2022-06-15 ENCOUNTER — APPOINTMENT (OUTPATIENT)
Dept: GENERAL RADIOLOGY | Age: 87
End: 2022-06-15
Payer: COMMERCIAL

## 2022-06-15 ENCOUNTER — HOSPITAL ENCOUNTER (EMERGENCY)
Age: 87
Discharge: HOME | End: 2022-06-15
Attending: EMERGENCY MEDICINE
Payer: COMMERCIAL

## 2022-06-15 VITALS
TEMPERATURE: 98.7 F | SYSTOLIC BLOOD PRESSURE: 142 MMHG | HEIGHT: 59 IN | WEIGHT: 127 LBS | HEART RATE: 92 BPM | DIASTOLIC BLOOD PRESSURE: 67 MMHG | OXYGEN SATURATION: 94 % | BODY MASS INDEX: 25.6 KG/M2 | RESPIRATION RATE: 14 BRPM

## 2022-06-15 DIAGNOSIS — L03.115 CELLULITIS OF RIGHT FOOT: Primary | ICD-10-CM

## 2022-06-15 LAB
-: ABNORMAL
AMORPHOUS: ABNORMAL
BACTERIA: ABNORMAL
BILIRUBIN URINE: NEGATIVE
CASTS UA: ABNORMAL /LPF
CASTS UA: ABNORMAL /LPF
COLOR: YELLOW
EPITHELIAL CELLS UA: ABNORMAL /HPF (ref 0–5)
GLUCOSE URINE: NEGATIVE
KETONES, URINE: ABNORMAL
LEUKOCYTE ESTERASE, URINE: NEGATIVE
MUCUS: ABNORMAL
NITRITE, URINE: NEGATIVE
OTHER OBSERVATIONS UA: ABNORMAL
PH UA: 6 (ref 5–8)
PROTEIN UA: ABNORMAL
RBC UA: ABNORMAL /HPF (ref 0–2)
SPECIFIC GRAVITY UA: 1.02 (ref 1–1.03)
TURBIDITY: ABNORMAL
URINE HGB: NEGATIVE
UROBILINOGEN, URINE: NORMAL
WBC UA: ABNORMAL /HPF (ref 0–5)

## 2022-06-15 PROCEDURE — 99284 EMERGENCY DEPT VISIT MOD MDM: CPT

## 2022-06-15 PROCEDURE — 73630 X-RAY EXAM OF FOOT: CPT

## 2022-06-15 PROCEDURE — 81001 URINALYSIS AUTO W/SCOPE: CPT

## 2022-06-15 RX ORDER — DOXYCYCLINE HYCLATE 100 MG
100 TABLET ORAL 2 TIMES DAILY
Qty: 20 TABLET | Refills: 0 | Status: SHIPPED | OUTPATIENT
Start: 2022-06-15 | End: 2022-06-25

## 2022-06-15 ASSESSMENT — PAIN - FUNCTIONAL ASSESSMENT: PAIN_FUNCTIONAL_ASSESSMENT: NONE - DENIES PAIN

## 2022-06-15 ASSESSMENT — PAIN DESCRIPTION - PAIN TYPE: TYPE: ACUTE PAIN

## 2022-06-15 ASSESSMENT — ENCOUNTER SYMPTOMS
SHORTNESS OF BREATH: 0
VOMITING: 0
DIARRHEA: 0
COUGH: 0

## 2022-06-15 ASSESSMENT — PAIN SCALES - GENERAL: PAINLEVEL_OUTOF10: 0

## 2022-06-15 ASSESSMENT — PAIN DESCRIPTION - ORIENTATION: ORIENTATION: RIGHT

## 2022-06-15 ASSESSMENT — PAIN DESCRIPTION - LOCATION: LOCATION: LEG

## 2022-06-15 ASSESSMENT — PAIN DESCRIPTION - FREQUENCY: FREQUENCY: INTERMITTENT

## 2022-06-15 NOTE — ED NOTES
Brought commode to bedside & pt was able to provide urine sample right away. Pt back in bed, bed in lowest position, call light w/in reach.  Family at bedside     Abraham Souza RN  06/15/22 9550

## 2022-06-15 NOTE — ED PROVIDER NOTES
[id number] [facility] ED  [address] [address] [facility] [address]  Phone: [phone]  Fax: [phone]        Pt Name: Rohit Caldwell  MRN: [id number]  [place] 7/30/1932  Date of evaluation: 6/15/22      CHIEF COMPLAINT     Chief Complaint   Patient presents with    Leg Pain     right ankle up to the right knee is painful, not able to bare weight, no known injury, onset over the weekend         HISTORY OF PRESENT ILLNESS  (Location/Symptom, Timing/Onset, Context/Setting, Quality, Duration, Modifying Factors, Severity.)    Rohit Caldwell is a 80 y.o. female who presents with right foot pain. The patient over the weekend started developing right foot pain unknown if there is any trauma as the patient does have dementia family members report also she has been urinating a little more frequently no fever no chills no reports of chest pain shortness of breath headache numbness weakness vomiting or diarrhea ambulation makes her right foot pain worse otherwise nothing else changes her symptoms      REVIEW OF SYSTEMS    (2-9 systems for level 4, 10 or more for level 5)     Review of Systems   Constitutional: Negative for chills and fever. Respiratory: Negative for cough and shortness of breath. Cardiovascular: Negative for chest pain. Gastrointestinal: Negative for diarrhea and vomiting. Genitourinary: Positive for frequency. Musculoskeletal:        Foot pain   Skin: Positive for rash. Neurological: Negative for weakness and numbness. PAST MEDICAL HISTORY    has a past medical history of Atrial fibrillation, Back pain, chronic, Dementia ([address] GERD (gastroesophageal reflux disease), Hiatal hernia, History of breast cancer, History of glaucoma, Hypertension, benign essential, goal below 140/90, and Hypothyroid. SURGICAL HISTORY      has a past surgical history that includes Cholecystectomy; Breast surgery; Cataract removal with implant; and bladder repair.     [address] Previous Medications    AMLODIPINE (NORVASC) 5 MG TABLET    TAKE 1 TABLET BY MOUTH EVERY DAY    ASCORBIC ACID (VITAMIN C) 500 MG TABLET    TAKE 1 TABLET BY MOUTH EVERY DAY    ATENOLOL (TENORMIN) 50 MG TABLET    TAKE 1 TABLET BY MOUTH EVERY DAY    DONEPEZIL (ARICEPT) 5 MG TABLET    TAKE (1) TABLET BY MOUTH NIGHTLY    DOXAZOSIN (CARDURA) 1 MG TABLET    TAKE 1 TABLET BY MOUTH EVERY DAY    FERROUS SULFATE (FEROSUL) 325 (65 FE) MG TABLET    TAKE 1 TABLET BY MOUTH DAILY WITH BREAKFAST    LATANOPROST (XALATAN) 0.005 % OPHTHALMIC SOLUTION    Apply to eye daily    LEVOTHYROXINE (SYNTHROID) 100 MCG TABLET    TAKE 1 TABLET BY MOUTH ONCE DAILY    MIRTAZAPINE (REMERON) 15 MG TABLET    TAKE 1 TABLET BY MOUTH NIGHTLY    MULTIPLE VITAMINS-MINERALS (ICAPS AREDS 2) CAPS    Take 1 tablet by mouth 2 times daily     MULTIPLE VITAMINS-MINERALS (PRESERVISION AREDS 2) CAPS    Take 1 capsule by mouth daily    OMEPRAZOLE (PRILOSEC) 20 MG DELAYED RELEASE CAPSULE    TAKE 1 CAPSULE BY MOUTH ONCE DAILY BEFORE A MEAL    POTASSIUM CHLORIDE (MICRO-K) 10 MEQ EXTENDED RELEASE CAPSULE    TAKE 1 CAPSULE BY MOUTH TWICE DAILY WITH FOOD    TIMOLOL (TIMOPTIC) 0.5 % OPHTHALMIC SOLUTION    instill 1 drop into both eyes twice a day    TRAMADOL (ULTRAM) 50 MG TABLET    TAKE 1 TABLET BY MOUTH EVERY 8 HOURS AS NEEDED FOR PAIN, REDUCE DOSES TAKEN AS PAIN BECOMES MANAGEABLE    WARFARIN (COUMADIN) 3 MG TABLET    Take 1 tablet by mouth daily 4.5 mg Tue/Sat and 3 mg al other days or as managed by [address] [facility] [address]     is allergic to latex, penicillins, and tylenol [acetaminophen]. FAMILY HISTORY     She indicated that her mother is . She indicated that her father is . family history includes No Known Problems in her father and mother. SOCIAL HISTORY      reports that she has never smoked. She has never used smokeless tobacco. She reports that she does not drink alcohol and does not use drugs.     PHYSICAL EXAM (up to 7 for level 4, 8 or more for level 5)   INITIAL VITALS:  height is 4' 11\" (1.499 m) and weight is 57.6 kg (127 lb). Her oral temperature is 98.7 °F (37.1 °C). Her blood pressure is 142/67 (abnormal) and her pulse is 92. Her oxygen saturation is 94%. Physical Exam  Vitals and nursing note reviewed. Constitutional:       Appearance: Normal appearance. HENT:      Head: Normocephalic and atraumatic. Eyes:      Conjunctiva/sclera: Conjunctivae normal.   Cardiovascular:      Rate and Rhythm: Normal rate and regular rhythm. Pulmonary:      Effort: Pulmonary effort is normal.      Breath sounds: Normal breath sounds. Abdominal:      General: There is no distension. Palpations: Abdomen is soft. There is no mass. Tenderness: There is no abdominal tenderness. There is no right CVA tenderness, left CVA tenderness, guarding or rebound. Musculoskeletal:         General: Normal range of motion. Cervical back: Normal range of motion and neck supple. Comments: Patient is noted to have some tenderness and swelling and slight erythema to the dorsum of the right foot no other bony point tenderness appreciated no calf swelling or tenderness appreciated   Skin:     Comments: Swelling and erythema to the dorsum of the right foot otherwise without further rashes or lesions   Neurological:      General: No focal deficit present. Mental Status: She is alert.          DIFFERENTIAL DIAGNOSIS/ MDM:     Get an x-ray of the right foot and I will put in for a UA    DIAGNOSTIC RESULTS         RADIOLOGY:        Interpretation per the Radiologist below, if available at the time of this note:    XR FOOT RIGHT (MIN 3 VIEWS) (Final result)  Result time 06/15/22 12:19:07  Final result by Sidra Simon MD (06/15/22 12:19:07)                Impression:    No acute bony abnormalities are noted             Narrative:    EXAMINATION:   THREE XRAY VIEWS OF THE RIGHT FOOT     6/15/2022 8:47 am COMPARISON:   None. HISTORY:   ORDERING SYSTEM PROVIDED HISTORY: pain   TECHNOLOGIST PROVIDED HISTORY:   pain   Reason for Exam: right foot pain     FINDINGS:   The visualized bones are osteopenic.  There is no evidence of fracture or   dislocation. . The  joint spaces appear well maintained. The soft tissues are   unremarkable.  Calcaneal spurs.  Vascular calcifications are seen compatible   with atherosclerotic disease. LABS:  Results for orders placed or performed during the hospital encounter of 06/15/22   Urinalysis with Reflex to Culture   Result Value Ref Range    Color, UA Yellow Yellow    Turbidity UA SLIGHTLY CLOUDY (A) Clear    Glucose, Ur NEGATIVE NEGATIVE    Bilirubin Urine NEGATIVE NEGATIVE    Ketones, Urine TRACE (A) NEGATIVE    Specific Gravity, UA 1.020 1.005 - 1.030    Urine Hgb NEGATIVE NEGATIVE    pH, UA 6.0 5.0 - 8.0    Protein, UA 1+ (A) NEGATIVE    Urobilinogen, Urine Normal Normal    Nitrite, Urine NEGATIVE NEGATIVE    Leukocyte Esterase, Urine NEGATIVE NEGATIVE   Microscopic Urinalysis   Result Value Ref Range    -          WBC, UA 2 TO 5 0 - 5 /HPF    RBC, UA 0 TO 2 0 - 2 /HPF    Casts UA 0 TO 2 /LPF    Casts UA HYALINE /LPF    Epithelial Cells UA 20 TO 50 0 - 5 /HPF    Bacteria, UA MODERATE (A) None    Mucus, UA 1+ (A) None    Amorphous, UA 1+ (A) None    Other Observations UA (A) NOT REQ. Utilizing a urinalysis as the only screening method to exclude a potential uropathogen can be unreliable in many patient populations. Rapid screening tests are less sensitive than culture and if UTI is a clinical possibility, culture should be considered despite a negative urinalysis.            EMERGENCY DEPARTMENT COURSE:   Vitals:    Vitals:    06/15/22 1126   BP: (!) 142/67   Pulse: 92   Temp: 98.7 °F (37.1 °C)   TempSrc: Oral   SpO2: 94%   Weight: 57.6 kg (127 lb)   Height: 4' 11\" (1.499 m)     -------------------------  BP: (!) 142/67, Temp: 98.7 °F (37.1 °C), Heart Rate: 92,        RE-EVALUATION:  The patient presents with what appears to be a cellulitis of the right foot x-ray showed no acute bony abnormality UA no overwhelming signs of infection with no nitrites no leukocytes and 2-5 white blood cells with some moderate bacteria with this I will place the patient on doxycycline she has a follow-up appointment already scheduled for Friday and then for Thursday of next week I am recommending that she continue those appointments return to the ER for increasing pain swelling redness any fever or other concerns otherwise to follow-up with her doctor on Friday as previously directed  At this time the patient is without objective evidence of an acute process requiring hospitalization or inpatient management. They have remained hemodynamically stable throughout their entire ED visit and are stable for discharge with outpatient follow-up. The patient understands that at this time there is no evidence for a more malignant underlying process, but the patient also understands that early in the process of an illness or injury, an emergency department workup can be falsely reassuring. Routine discharge counseling was given, and the patient understands that worsening, changing or persistent symptoms should prompt an immediate call or follow up with their primary physician or return to the emergency department. The importance of appropriate follow up was also discussed. I have reviewed the disposition diagnosis with the patient and or their family/guardian. I have answered their questions and given discharge instructions. They voiced understanding of these instructions and did not have any further questions or complaints. PROCEDURES:  None    FINAL IMPRESSION      1.  Cellulitis of right foot          DISPOSITION/PLAN   DISPOSITION        CONDITION ON DISPOSITION:   Stable    PATIENT REFERRED TO:  Maryetta Seip, [address]  [address] [id number] [phone]    Call in 1 day        DISCHARGE MEDICATIONS:  New Prescriptions    DOXYCYCLINE HYCLATE (VIBRA-TABS) 100 MG TABLET    Take 1 tablet by mouth 2 times daily for 10 days       (Please note that portions of this note were completed with a voicerecognition program.  Efforts were made to edit the dictations but occasionally words are mis-transcribed.)    Steve Rock MD,, MD, F.A.C.E.P.   Attending Emergency Medicine Physician       Steve Rock MD  06/15/22 [phone]

## 2022-06-15 NOTE — ED NOTES
Dr Nieves November at bedside     Jayla Cortez, [address] [place] [address]  06/15/22 [phone]

## 2022-06-15 NOTE — ED NOTES
Pt brought to room by wc alongside two caregivers (son & daughter-in-laws'). C/o right leg pain, pain in ankle that radiates to the right knee. Pt also reports numbness in her right foot. There is no known injury. Onset of leg pain was over the weekend & than pt was unable to bare weight on Monday. Pt took prescribed tramadol PTA. Pt has difficulty answering some screening questions. I.e. pain assessment. Family reports pt experienced visual hallucinations ,\"she just said to me the wall looks like its melting. \" RR are easy/unlabored, alert, PWD.       Varghese Hammond RN  06/15/22 [id number]

## 2022-06-17 ENCOUNTER — HOSPITAL ENCOUNTER (OUTPATIENT)
Dept: PHARMACY | Age: 87
Setting detail: THERAPIES SERIES
Discharge: HOME | End: 2022-06-17
Payer: COMMERCIAL

## 2022-06-17 LAB
INR BLD: 5.3
PROTIME: 63.4 SECONDS

## 2022-06-17 PROCEDURE — 85610 PROTHROMBIN TIME: CPT

## 2022-06-17 PROCEDURE — 99212 OFFICE O/P EST SF 10 MIN: CPT

## 2022-06-17 NOTE — PROGRESS NOTES
Patient seen in person in Medication Management Service. Patient states compliant most of the time with regimen. No bleeding or thromboembolic side effects noted. No significant med or dietary changes. No significant recent illness or disease state changes. PT/INR done via POC meter per protocol. INR was supratherapeutic at 5.3.  (goal 2 - 3)  Today is day 3/10 of a Doxycycline Antibiotic Regimen for Cellulitis of the foot    Warfarin regimen will be held for 3 days, then plan warfarin 1.5 mg on 6/20 and 6/23 and 3 mg on 6/21 and 6/22  Will retest in 1 week. The patient was advised of precautions for an elevated INR    Patient understands dosing directions and information discussed. Dosing schedule and follow up appointment given to patient. Progress note routed to referring physicians office. Patient acknowledges working in [address] [place] [address] with Pharmacist as referred by his/her physician/provider. COVID 19 screening completed. At this time patient denies symptoms, recent travel and exposure. Patient educated to screen for temperature and COVID-19 symptoms prior to coming to clinic for next appointment. They are instructed to call the clinic to reschedule if they have any symptoms. Standing order for PT/INR has been placed in preparation to transition to possible remote INR monitoring given efforts to reduce the spread of COVID-19.       For Pharmacy Admin Tracking Only     Intervention Detail: Dose Adjustment: 1, reason: Therapy De-escalation   Total # of Interventions Recommended: 1   Total # of Interventions Accepted: 1   Time Spent (min): 20

## 2022-06-24 ENCOUNTER — HOSPITAL ENCOUNTER (OUTPATIENT)
Dept: PHARMACY | Age: 87
Setting detail: THERAPIES SERIES
Discharge: HOME | End: 2022-06-24
Payer: COMMERCIAL

## 2022-06-24 DIAGNOSIS — I48.20 CHRONIC ATRIAL FIBRILLATION (HCC): Primary | ICD-10-CM

## 2022-06-24 LAB
INR BLD: 2.2
PROTIME: 26.9 SECONDS

## 2022-06-24 PROCEDURE — 99212 OFFICE O/P EST SF 10 MIN: CPT

## 2022-06-24 PROCEDURE — 85610 PROTHROMBIN TIME: CPT

## 2022-06-24 NOTE — PROGRESS NOTES
Patient seen in person in Medication Management Service. Patient states compliant all of the time with regimen. No bleeding or thromboembolic side effects noted. No significant med or dietary changes. No significant recent illness or disease state changes. Patient has 2 days remaining of doxycycline for infection. Patient is also on prednisone 20 mg daily for 5 days. Today is day 2 of 5. PT/INR done via POC meter per protocol. INR was therapeutic at 2.2.  (goal 2 - 3)    Warfarin regimen will be 1.5 mg today and then resume 1.5 mg Sat and 3 mg all other days as patient is finishing doxycycline. Will retest in 1 week. Patient understands dosing directions and information discussed. Dosing schedule and follow up appointment given to patient. Progress note routed to referring physicians office. Patient acknowledges working in [address] [place] [address] with Pharmacist as referred by his/her physician/provider. COVID 19 screening completed. At this time patient denies symptoms, recent travel and exposure. Patient educated to screen for temperature and COVID-19 symptoms prior to coming to clinic for next appointment. They are instructed to call the clinic to reschedule if they have any symptoms. Standing order for PT/INR has been placed in preparation to transition to possible remote INR monitoring given efforts to reduce the spread of COVID-19.       For Pharmacy Admin Tracking Only     Intervention Detail: Dose Adjustment: 1, reason: Therapy Optimization   Total # of Interventions Recommended: 1   Total # of Interventions Accepted: 1   Time Spent (min): 20

## 2022-07-01 ENCOUNTER — HOSPITAL ENCOUNTER (OUTPATIENT)
Dept: PHARMACY | Age: 87
Setting detail: THERAPIES SERIES
Discharge: HOME | End: 2022-07-01
Payer: COMMERCIAL

## 2022-07-01 LAB
INR BLD: 2.5
PROTIME: 30.5 SECONDS

## 2022-07-01 PROCEDURE — 99212 OFFICE O/P EST SF 10 MIN: CPT

## 2022-07-01 PROCEDURE — 85610 PROTHROMBIN TIME: CPT

## 2022-07-01 NOTE — PROGRESS NOTES
Patient seen in person in Medication Management Service. Patient states compliant most of the time with regimen. No bleeding or thromboembolic side effects noted. No significant med or dietary changes. No significant recent illness or disease state changes. PT/INR done via POC meter per protocol. INR was therapeutic at 2.5.  (goal 2 - 3)  The patient has been taking warfarin 3 mg daily since the Doxycycline and Prednisone Regimens have been completed    Warfarin regimen will be continued at current dose 3 mg daily. Will retest in 4 weeks. Patient understands dosing directions and information discussed. Dosing schedule and follow up appointment given to patient. Progress note routed to referring physicians office. Patient acknowledges working in [address] [place] [address] with Pharmacist as referred by his/her physician/provider. COVID 19 screening completed. At this time patient denies symptoms, recent travel and exposure. Patient educated to screen for temperature and COVID-19 symptoms prior to coming to clinic for next appointment. They are instructed to call the clinic to reschedule if they have any symptoms. Standing order for PT/INR has been placed in preparation to transition to possible remote INR monitoring given efforts to reduce the spread of COVID-19.       For Pharmacy Admin Tracking Only     Intervention Detail:    Total # of Interventions Recommended: 0   Total # of Interventions Accepted: 0   Time Spent (min): 20

## 2022-07-29 ENCOUNTER — HOSPITAL ENCOUNTER (OUTPATIENT)
Dept: PHARMACY | Age: 87
Setting detail: THERAPIES SERIES
Discharge: HOME | End: 2022-07-29
Payer: COMMERCIAL

## 2022-07-29 DIAGNOSIS — I48.20 CHRONIC ATRIAL FIBRILLATION (HCC): Primary | ICD-10-CM

## 2022-07-29 LAB
INR BLD: 3.9
PROTIME: 47.1 SECONDS

## 2022-07-29 PROCEDURE — 99213 OFFICE O/P EST LOW 20 MIN: CPT

## 2022-07-29 PROCEDURE — 85610 PROTHROMBIN TIME: CPT

## 2022-07-29 NOTE — PROGRESS NOTES
Patient seen in person in Medication Management Service. Patient states compliant all of the time with regimen. No bleeding or thromboembolic side effects noted. No significant med or dietary changes. No cranberry, grapefruit, or alcohol. No significant recent illness or disease state changes. Feeling much better, no antibiotics or steroids. PT/INR done via POC meter per protocol. INR was supratherapeutic at 3.9.  (goal 2 - 3)    Warfarin regimen will be held for 1 day and then reduce 1.5 mg Saturday and 3 mg all other days  Will retest in 1 week. Patient counseled on signs/symptoms of bleeding and when to seek medical attention. Patient instructed to use extra precautions with elevated INR. Patient understands dosing directions and information discussed. Dosing schedule and follow up appointment given to patient. Progress note routed to referring physicians office. Patient acknowledges working in [address] [place] [address] with Pharmacist as referred by his/her physician/provider. COVID 19 screening completed. At this time patient denies symptoms, recent travel and exposure. Patient educated to screen for temperature and COVID-19 symptoms prior to coming to clinic for next appointment. They are instructed to call the clinic to reschedule if they have any symptoms. Standing order for PT/INR has been placed in preparation to transition to possible remote INR monitoring given efforts to reduce the spread of COVID-19.       For Pharmacy Admin Tracking Only    Intervention Detail: Dose Adjustment: 1, reason: Therapy Optimization  Total # of Interventions Recommended: 1  Total # of Interventions Accepted: 1  Time Spent (min): 20

## 2022-08-12 ENCOUNTER — HOSPITAL ENCOUNTER (OUTPATIENT)
Dept: PHARMACY | Age: 87
Setting detail: THERAPIES SERIES
Discharge: HOME | End: 2022-08-12
Payer: COMMERCIAL

## 2022-08-12 DIAGNOSIS — I48.20 CHRONIC ATRIAL FIBRILLATION (HCC): Primary | ICD-10-CM

## 2022-08-12 LAB
INR BLD: 3.4
PROTIME: 40.4 SECONDS

## 2022-08-12 PROCEDURE — 85610 PROTHROMBIN TIME: CPT

## 2022-08-12 PROCEDURE — 99212 OFFICE O/P EST SF 10 MIN: CPT

## 2022-08-12 NOTE — PROGRESS NOTES
Patient seen in person in Medication Management Service. Patient/patient's daughter states compliant all of the time with regimen. No bleeding or thromboembolic side effects noted. No significant dietary changes. Drinking more Sprite instead of juices. No significant recent illness or disease state changes. Patient has been using salonpas cream for arthritis cream. Pt states she puts it everywhere multiple times a day. Did discuss using as directed on the package. More pain lately. PT/INR done via POC meter per protocol. INR was supratherapeutic at 3.4.  (goal 2 - 3)    Warfarin regimen will be held for 1 day and then reduced to 1.5 mg Tues/Sat and 3 mg all other days. Will retest in 2 weeks. Patient counseled on signs/symptoms of bleeding and when to seek medical attention. Patient instructed to use extra precautions with elevated INR. Patient understands dosing directions and information discussed. Dosing schedule and follow up appointment given to patient. Progress note routed to referring physicians office. Patient acknowledges working in [address] [place] [address] with Pharmacist as referred by his/her physician/provider. COVID 19 screening completed. At this time patient denies symptoms, recent travel and exposure. Patient educated to screen for temperature and COVID-19 symptoms prior to coming to clinic for next appointment. They are instructed to call the clinic to reschedule if they have any symptoms. Standing order for PT/INR has been placed in preparation to transition to possible remote INR monitoring given efforts to reduce the spread of COVID-19.       For Pharmacy Admin Tracking Only    Intervention Detail: Adherence Monitorin and Dose Adjustment: 1, reason: Therapy Optimization  Total # of Interventions Recommended: 2  Total # of Interventions Accepted: 2  Time Spent (min): 20

## 2022-08-26 ENCOUNTER — TELEPHONE (OUTPATIENT)
Dept: PHARMACY | Age: 87
End: 2022-08-26

## 2022-09-01 ENCOUNTER — TELEPHONE (OUTPATIENT)
Dept: PHARMACY | Age: 87
End: 2022-09-01

## 2022-09-08 ENCOUNTER — HOSPITAL ENCOUNTER (OUTPATIENT)
Dept: PHARMACY | Age: 87
Setting detail: THERAPIES SERIES
Discharge: HOME | End: 2022-09-08
Payer: COMMERCIAL

## 2022-09-08 DIAGNOSIS — I48.20 CHRONIC ATRIAL FIBRILLATION (HCC): Primary | ICD-10-CM

## 2022-09-08 LAB
INR BLD: 2.3
PROTIME: 27.4 SECONDS

## 2022-09-08 PROCEDURE — 85610 PROTHROMBIN TIME: CPT

## 2022-09-08 PROCEDURE — 99211 OFF/OP EST MAY X REQ PHY/QHP: CPT

## 2022-09-08 NOTE — PROGRESS NOTES
Patient seen in person in Medication Management Service. Patient states compliant all of the time with regimen. No bleeding or thromboembolic side effects noted. No significant med or dietary changes. No significant recent illness or disease state changes. PT/INR done via POC meter per protocol. INR was therapeutic at 2.3.  (goal 2 - 3)    Warfarin regimen will be continued at current dose 1.5 mg Tues/Sat and 3 mg all other days. Will retest in 4 weeks. Patient understands dosing directions and information discussed. Dosing schedule and follow up appointment given to patient. Progress note routed to referring physicians office. Patient acknowledges working in [address] [place] [address] with Pharmacist as referred by his/her physician/provider. COVID 19 screening completed. At this time patient denies symptoms, recent travel and exposure. Patient educated to screen for temperature and COVID-19 symptoms prior to coming to clinic for next appointment. They are instructed to call the clinic to reschedule if they have any symptoms. Standing order for PT/INR has been placed in preparation to transition to possible remote INR monitoring given efforts to reduce the spread of COVID-19.       For Pharmacy Admin Tracking Only    Intervention Detail:   Total # of Interventions Recommended: 0  Total # of Interventions Accepted: 0  Time Spent (min): 20

## 2022-10-07 ENCOUNTER — TELEPHONE (OUTPATIENT)
Dept: PHARMACY | Age: 87
End: 2022-10-07

## 2022-10-07 NOTE — TELEPHONE ENCOUNTER
Daughter [facility] left voicemail. Patient was unable to come to appointment due to feeling unwell. Left message to reschedule missed appointment.     Roma Vergara RPH,PharmD, BCACP  10/7/2022  12:58 PM

## 2022-10-13 ENCOUNTER — TELEPHONE (OUTPATIENT)
Dept: PHARMACY | Age: 87
End: 2022-10-13

## 2022-10-13 NOTE — TELEPHONE ENCOUNTER
Spoke with patient daughter, Salvador Esposito, to reschedule the patients missed INR check. Salvador Esposito states she is only able to come in after 3 pm tomorrow. Patient scheduled for tomorrow.    Eyad HernandezD, BCPS  10/13/2022 8:14 AM

## 2022-10-14 ENCOUNTER — HOSPITAL ENCOUNTER (OUTPATIENT)
Dept: PHARMACY | Age: 87
Setting detail: THERAPIES SERIES
Discharge: HOME | End: 2022-10-14
Payer: COMMERCIAL

## 2022-10-14 DIAGNOSIS — I48.20 CHRONIC ATRIAL FIBRILLATION (HCC): Primary | ICD-10-CM

## 2022-10-14 LAB
INR BLD: 2.6
PROTIME: 31 SECONDS

## 2022-10-14 PROCEDURE — 85610 PROTHROMBIN TIME: CPT

## 2022-10-14 PROCEDURE — 99211 OFF/OP EST MAY X REQ PHY/QHP: CPT

## 2022-10-14 NOTE — PROGRESS NOTES
Patient seen in person in Medication Management Service. Patient states compliant all of the time with regimen. No bleeding or thromboembolic side effects noted. No significant med or dietary changes. No significant recent illness or disease state changes. PT/INR done via POC meter per protocol. INR was therapeutic at 2.6.  (goal 2 - 3)    Warfarin regimen will be continued at current dose 1.5 mg Tues/Sat and 3 mg all other days. Will retest in 5 weeks per daughter's request with work schedule. Patient understands dosing directions and information discussed. Dosing schedule and follow up appointment given to patient. Progress note routed to referring physicians office. Patient acknowledges working in [address] [place] [address] with Pharmacist as referred by his/her physician/provider. COVID 19 screening completed. At this time patient denies symptoms, recent travel and exposure. Patient educated to screen for temperature and COVID-19 symptoms prior to coming to clinic for next appointment. They are instructed to call the clinic to reschedule if they have any symptoms. Standing order for PT/INR has been placed in preparation to transition to possible remote INR monitoring given efforts to reduce the spread of COVID-19.       For Pharmacy Admin Tracking Only    Intervention Detail:   Total # of Interventions Recommended: 0  Total # of Interventions Accepted: 0  Time Spent (min): 20

## 2022-10-17 ENCOUNTER — HOSPITAL ENCOUNTER (INPATIENT)
Age: 87
LOS: 7 days | Discharge: HOME HEALTH | DRG: 347 | End: 2022-10-24
Attending: SPECIALIST | Admitting: HOSPITALIST
Payer: COMMERCIAL

## 2022-10-17 ENCOUNTER — APPOINTMENT (OUTPATIENT)
Dept: GENERAL RADIOLOGY | Age: 87
DRG: 347 | End: 2022-10-17
Payer: COMMERCIAL

## 2022-10-17 ENCOUNTER — APPOINTMENT (OUTPATIENT)
Dept: CT IMAGING | Age: 87
DRG: 347 | End: 2022-10-17
Payer: COMMERCIAL

## 2022-10-17 ENCOUNTER — ANESTHESIA (OUTPATIENT)
Dept: OPERATING ROOM | Age: 87
DRG: 347 | End: 2022-10-17
Payer: COMMERCIAL

## 2022-10-17 ENCOUNTER — ANESTHESIA EVENT (OUTPATIENT)
Dept: OPERATING ROOM | Age: 87
DRG: 347 | End: 2022-10-17
Payer: COMMERCIAL

## 2022-10-17 DIAGNOSIS — D50.0 IRON DEFICIENCY ANEMIA DUE TO CHRONIC BLOOD LOSS: ICD-10-CM

## 2022-10-17 DIAGNOSIS — K63.1 BOWEL PERFORATION (HCC): ICD-10-CM

## 2022-10-17 DIAGNOSIS — R19.8 PERFORATED VISCUS: ICD-10-CM

## 2022-10-17 DIAGNOSIS — R10.9 ABDOMINAL PAIN, UNSPECIFIED ABDOMINAL LOCATION: Primary | ICD-10-CM

## 2022-10-17 LAB
ABSOLUTE EOS #: 0.17 K/UL (ref 0–0.4)
ABSOLUTE LYMPH #: 3.07 K/UL (ref 1–4.8)
ABSOLUTE MONO #: 0.55 K/UL (ref 0.1–0.8)
ALBUMIN SERPL-MCNC: 3.8 G/DL (ref 3.5–5.2)
ALBUMIN/GLOBULIN RATIO: 1 (ref 1–2.5)
ALP BLD-CCNC: 132 U/L (ref 35–104)
ALT SERPL-CCNC: 9 U/L (ref 5–33)
ANION GAP SERPL CALCULATED.3IONS-SCNC: 13 MMOL/L (ref 9–17)
ANION GAP SERPL CALCULATED.3IONS-SCNC: 14 MMOL/L (ref 9–17)
AST SERPL-CCNC: 20 U/L
BASOPHILS # BLD: 0 % (ref 0–2)
BASOPHILS ABSOLUTE: 0 K/UL (ref 0–0.2)
BILIRUB SERPL-MCNC: 0.5 MG/DL (ref 0.3–1.2)
BUN BLDV-MCNC: 14 MG/DL (ref 8–23)
BUN BLDV-MCNC: 17 MG/DL (ref 8–23)
CALCIUM SERPL-MCNC: 8.2 MG/DL (ref 8.6–10.4)
CALCIUM SERPL-MCNC: 9.4 MG/DL (ref 8.6–10.4)
CHLORIDE BLD-SCNC: 104 MMOL/L (ref 98–107)
CHLORIDE BLD-SCNC: 107 MMOL/L (ref 98–107)
CO2: 16 MMOL/L (ref 20–31)
CO2: 22 MMOL/L (ref 20–31)
CREAT SERPL-MCNC: 0.97 MG/DL (ref 0.5–0.9)
CREAT SERPL-MCNC: 1.02 MG/DL (ref 0.5–0.9)
EOSINOPHILS RELATIVE PERCENT: 3 % (ref 1–4)
FIO2: 100
GFR SERPL CREATININE-BSD FRML MDRD: 52 ML/MIN/1.73M2
GFR SERPL CREATININE-BSD FRML MDRD: 56 ML/MIN/1.73M2
GLUCOSE BLD-MCNC: 109 MG/DL (ref 70–99)
GLUCOSE BLD-MCNC: 138 MG/DL (ref 70–99)
HCT VFR BLD CALC: 38.8 % (ref 36–46)
HCT VFR BLD CALC: 43.8 % (ref 36–46)
HEMOGLOBIN: 12.9 G/DL (ref 12–16)
HEMOGLOBIN: 14.3 G/DL (ref 12–16)
INR BLD: 2.8
LACTIC ACID: 3.7 MMOL/L (ref 0.5–2.2)
LACTIC ACID: 5.2 MMOL/L (ref 0.5–2.2)
LIPASE: 22 U/L (ref 13–60)
LYMPHOCYTES # BLD: 56 % (ref 24–44)
MAGNESIUM: 2 MG/DL (ref 1.6–2.6)
MCH RBC QN AUTO: 31.3 PG (ref 26–34)
MCH RBC QN AUTO: 31.6 PG (ref 26–34)
MCHC RBC AUTO-ENTMCNC: 32.7 G/DL (ref 31–37)
MCHC RBC AUTO-ENTMCNC: 33.2 G/DL (ref 31–37)
MCV RBC AUTO: 95.1 FL (ref 80–100)
MCV RBC AUTO: 95.5 FL (ref 80–100)
MODE: ABNORMAL
MONOCYTES # BLD: 10 % (ref 1–7)
MORPHOLOGY: NORMAL
NEGATIVE BASE EXCESS, ART: 8 (ref 0–2)
PARTIAL THROMBOPLASTIN TIME: 34.9 SEC (ref 21.3–31.3)
PDW BLD-RTO: 14.2 % (ref 12.5–15.4)
PDW BLD-RTO: 14.6 % (ref 12.5–15.4)
PLATELET # BLD: 148 K/UL (ref 140–450)
PLATELET # BLD: 203 K/UL (ref 140–450)
PMV BLD AUTO: 7.3 FL (ref 6–12)
PMV BLD AUTO: 7.7 FL (ref 6–12)
POC HCO3: 18.2 MMOL/L (ref 21–28)
POC O2 SATURATION: 100 % (ref 94–98)
POC PCO2: 38.8 MM HG (ref 35–48)
POC PH: 7.28 (ref 7.35–7.45)
POC PO2: 354.1 MM HG (ref 83–108)
POTASSIUM SERPL-SCNC: 4.2 MMOL/L (ref 3.7–5.3)
POTASSIUM SERPL-SCNC: 4.5 MMOL/L (ref 3.7–5.3)
PROTHROMBIN TIME: 27.4 SEC (ref 9.4–12.6)
RBC # BLD: 4.08 M/UL (ref 4–5.2)
RBC # BLD: 4.58 M/UL (ref 4–5.2)
SAMPLE SITE: ABNORMAL
SEG NEUTROPHILS: 31 % (ref 36–66)
SEGMENTED NEUTROPHILS ABSOLUTE COUNT: 1.71 K/UL (ref 1.8–7.7)
SODIUM BLD-SCNC: 137 MMOL/L (ref 135–144)
SODIUM BLD-SCNC: 139 MMOL/L (ref 135–144)
TOTAL PROTEIN: 7.5 G/DL (ref 6.4–8.3)
WBC # BLD: 4.7 K/UL (ref 3.5–11)
WBC # BLD: 5.5 K/UL (ref 3.5–11)

## 2022-10-17 PROCEDURE — 37799 UNLISTED PX VASCULAR SURGERY: CPT

## 2022-10-17 PROCEDURE — 6360000002 HC RX W HCPCS: Performed by: EMERGENCY MEDICINE

## 2022-10-17 PROCEDURE — 2500000003 HC RX 250 WO HCPCS: Performed by: ANESTHESIOLOGY

## 2022-10-17 PROCEDURE — 6370000000 HC RX 637 (ALT 250 FOR IP): Performed by: SURGERY

## 2022-10-17 PROCEDURE — 80048 BASIC METABOLIC PNL TOTAL CA: CPT

## 2022-10-17 PROCEDURE — 44120 REMOVAL OF SMALL INTESTINE: CPT | Performed by: SURGERY

## 2022-10-17 PROCEDURE — 36556 INSERT NON-TUNNEL CV CATH: CPT | Performed by: SURGERY

## 2022-10-17 PROCEDURE — 6370000000 HC RX 637 (ALT 250 FOR IP): Performed by: INTERNAL MEDICINE

## 2022-10-17 PROCEDURE — 2000000000 HC ICU R&B

## 2022-10-17 PROCEDURE — 94002 VENT MGMT INPAT INIT DAY: CPT

## 2022-10-17 PROCEDURE — 82803 BLOOD GASES ANY COMBINATION: CPT

## 2022-10-17 PROCEDURE — C1751 CATH, INF, PER/CENT/MIDLINE: HCPCS | Performed by: SURGERY

## 2022-10-17 PROCEDURE — 6360000002 HC RX W HCPCS: Performed by: NURSE ANESTHETIST, CERTIFIED REGISTERED

## 2022-10-17 PROCEDURE — 2580000003 HC RX 258: Performed by: SPECIALIST

## 2022-10-17 PROCEDURE — 93005 ELECTROCARDIOGRAM TRACING: CPT | Performed by: SPECIALIST

## 2022-10-17 PROCEDURE — 2709999900 HC NON-CHARGEABLE SUPPLY: Performed by: SURGERY

## 2022-10-17 PROCEDURE — 94761 N-INVAS EAR/PLS OXIMETRY MLT: CPT

## 2022-10-17 PROCEDURE — 3700000001 HC ADD 15 MINUTES (ANESTHESIA): Performed by: SURGERY

## 2022-10-17 PROCEDURE — 36556 INSERT NON-TUNNEL CV CATH: CPT

## 2022-10-17 PROCEDURE — 99285 EMERGENCY DEPT VISIT HI MDM: CPT

## 2022-10-17 PROCEDURE — 2060000000 HC ICU INTERMEDIATE R&B

## 2022-10-17 PROCEDURE — 74177 CT ABD & PELVIS W/CONTRAST: CPT

## 2022-10-17 PROCEDURE — 71045 X-RAY EXAM CHEST 1 VIEW: CPT

## 2022-10-17 PROCEDURE — 85730 THROMBOPLASTIN TIME PARTIAL: CPT

## 2022-10-17 PROCEDURE — 88307 TISSUE EXAM BY PATHOLOGIST: CPT

## 2022-10-17 PROCEDURE — 6360000002 HC RX W HCPCS: Performed by: SPECIALIST

## 2022-10-17 PROCEDURE — 2580000003 HC RX 258: Performed by: EMERGENCY MEDICINE

## 2022-10-17 PROCEDURE — 85027 COMPLETE CBC AUTOMATED: CPT

## 2022-10-17 PROCEDURE — 99222 1ST HOSP IP/OBS MODERATE 55: CPT | Performed by: SURGERY

## 2022-10-17 PROCEDURE — 3600000002 HC SURGERY LEVEL 2 BASE: Performed by: SURGERY

## 2022-10-17 PROCEDURE — 99222 1ST HOSP IP/OBS MODERATE 55: CPT | Performed by: HOSPITALIST

## 2022-10-17 PROCEDURE — 2720000010 HC SURG SUPPLY STERILE: Performed by: SURGERY

## 2022-10-17 PROCEDURE — 6360000002 HC RX W HCPCS: Performed by: INTERNAL MEDICINE

## 2022-10-17 PROCEDURE — 83735 ASSAY OF MAGNESIUM: CPT

## 2022-10-17 PROCEDURE — 02HV33Z INSERTION OF INFUSION DEVICE INTO SUPERIOR VENA CAVA, PERCUTANEOUS APPROACH: ICD-10-PCS | Performed by: SURGERY

## 2022-10-17 PROCEDURE — 6360000004 HC RX CONTRAST MEDICATION: Performed by: SPECIALIST

## 2022-10-17 PROCEDURE — 2500000003 HC RX 250 WO HCPCS: Performed by: HOSPITALIST

## 2022-10-17 PROCEDURE — 85025 COMPLETE CBC W/AUTO DIFF WBC: CPT

## 2022-10-17 PROCEDURE — 2500000003 HC RX 250 WO HCPCS: Performed by: NURSE ANESTHETIST, CERTIFIED REGISTERED

## 2022-10-17 PROCEDURE — 85610 PROTHROMBIN TIME: CPT

## 2022-10-17 PROCEDURE — 0DBA4ZZ EXCISION OF JEJUNUM, PERCUTANEOUS ENDOSCOPIC APPROACH: ICD-10-PCS | Performed by: SURGERY

## 2022-10-17 PROCEDURE — 76937 US GUIDE VASCULAR ACCESS: CPT | Performed by: SURGERY

## 2022-10-17 PROCEDURE — 96375 TX/PRO/DX INJ NEW DRUG ADDON: CPT

## 2022-10-17 PROCEDURE — 36415 COLL VENOUS BLD VENIPUNCTURE: CPT

## 2022-10-17 PROCEDURE — 96374 THER/PROPH/DIAG INJ IV PUSH: CPT

## 2022-10-17 PROCEDURE — 2500000003 HC RX 250 WO HCPCS: Performed by: EMERGENCY MEDICINE

## 2022-10-17 PROCEDURE — 83690 ASSAY OF LIPASE: CPT

## 2022-10-17 PROCEDURE — 3700000000 HC ANESTHESIA ATTENDED CARE: Performed by: SURGERY

## 2022-10-17 PROCEDURE — 2580000003 HC RX 258: Performed by: NURSE ANESTHETIST, CERTIFIED REGISTERED

## 2022-10-17 PROCEDURE — 6360000002 HC RX W HCPCS

## 2022-10-17 PROCEDURE — 83605 ASSAY OF LACTIC ACID: CPT

## 2022-10-17 PROCEDURE — 3600000012 HC SURGERY LEVEL 2 ADDTL 15MIN: Performed by: SURGERY

## 2022-10-17 PROCEDURE — 2580000003 HC RX 258: Performed by: INTERNAL MEDICINE

## 2022-10-17 PROCEDURE — 80053 COMPREHEN METABOLIC PANEL: CPT

## 2022-10-17 PROCEDURE — 99291 CRITICAL CARE FIRST HOUR: CPT | Performed by: INTERNAL MEDICINE

## 2022-10-17 PROCEDURE — 2580000003 HC RX 258: Performed by: HOSPITALIST

## 2022-10-17 RX ORDER — SODIUM CHLORIDE, SODIUM LACTATE, POTASSIUM CHLORIDE, CALCIUM CHLORIDE 600; 310; 30; 20 MG/100ML; MG/100ML; MG/100ML; MG/100ML
INJECTION, SOLUTION INTRAVENOUS CONTINUOUS
Status: DISCONTINUED | OUTPATIENT
Start: 2022-10-17 | End: 2022-10-23

## 2022-10-17 RX ORDER — TRANEXAMIC ACID 100 MG/ML
INJECTION, SOLUTION INTRAVENOUS
Status: DISCONTINUED
Start: 2022-10-17 | End: 2022-10-18

## 2022-10-17 RX ORDER — 0.9 % SODIUM CHLORIDE 0.9 %
500 INTRAVENOUS SOLUTION INTRAVENOUS ONCE
Status: COMPLETED | OUTPATIENT
Start: 2022-10-17 | End: 2022-10-17

## 2022-10-17 RX ORDER — FENTANYL CITRATE 50 UG/ML
50 INJECTION, SOLUTION INTRAMUSCULAR; INTRAVENOUS
Status: DISCONTINUED | OUTPATIENT
Start: 2022-10-17 | End: 2022-10-20

## 2022-10-17 RX ORDER — ONDANSETRON 2 MG/ML
4 INJECTION INTRAMUSCULAR; INTRAVENOUS ONCE
Status: COMPLETED | OUTPATIENT
Start: 2022-10-17 | End: 2022-10-17

## 2022-10-17 RX ORDER — PROPOFOL 10 MG/ML
INJECTION, EMULSION INTRAVENOUS PRN
Status: DISCONTINUED | OUTPATIENT
Start: 2022-10-17 | End: 2022-10-17 | Stop reason: SDUPTHER

## 2022-10-17 RX ORDER — MORPHINE SULFATE 4 MG/ML
4 INJECTION, SOLUTION INTRAMUSCULAR; INTRAVENOUS ONCE
Status: COMPLETED | OUTPATIENT
Start: 2022-10-17 | End: 2022-10-17

## 2022-10-17 RX ORDER — ROCURONIUM BROMIDE 10 MG/ML
INJECTION, SOLUTION INTRAVENOUS PRN
Status: DISCONTINUED | OUTPATIENT
Start: 2022-10-17 | End: 2022-10-17 | Stop reason: SDUPTHER

## 2022-10-17 RX ORDER — PROPOFOL 10 MG/ML
INJECTION, EMULSION INTRAVENOUS
Status: COMPLETED
Start: 2022-10-17 | End: 2022-10-17

## 2022-10-17 RX ORDER — 0.9 % SODIUM CHLORIDE 0.9 %
80 INTRAVENOUS SOLUTION INTRAVENOUS ONCE
Status: DISCONTINUED | OUTPATIENT
Start: 2022-10-17 | End: 2022-10-24 | Stop reason: HOSPADM

## 2022-10-17 RX ORDER — PROPOFOL 10 MG/ML
5-50 INJECTION, EMULSION INTRAVENOUS CONTINUOUS
Status: DISCONTINUED | OUTPATIENT
Start: 2022-10-17 | End: 2022-10-18

## 2022-10-17 RX ORDER — PHENYLEPHRINE HCL IN 0.9% NACL 1 MG/10 ML
SYRINGE (ML) INTRAVENOUS PRN
Status: DISCONTINUED | OUTPATIENT
Start: 2022-10-17 | End: 2022-10-17 | Stop reason: SDUPTHER

## 2022-10-17 RX ORDER — SODIUM CHLORIDE 0.9 % (FLUSH) 0.9 %
10 SYRINGE (ML) INJECTION PRN
Status: DISCONTINUED | OUTPATIENT
Start: 2022-10-17 | End: 2022-10-24 | Stop reason: HOSPADM

## 2022-10-17 RX ORDER — MORPHINE SULFATE 4 MG/ML
4 INJECTION, SOLUTION INTRAMUSCULAR; INTRAVENOUS
Status: DISCONTINUED | OUTPATIENT
Start: 2022-10-17 | End: 2022-10-24 | Stop reason: HOSPADM

## 2022-10-17 RX ORDER — ONDANSETRON 2 MG/ML
4 INJECTION INTRAMUSCULAR; INTRAVENOUS EVERY 6 HOURS PRN
Status: DISCONTINUED | OUTPATIENT
Start: 2022-10-17 | End: 2022-10-24 | Stop reason: HOSPADM

## 2022-10-17 RX ORDER — METRONIDAZOLE 500 MG/100ML
500 INJECTION, SOLUTION INTRAVENOUS EVERY 8 HOURS
Status: COMPLETED | OUTPATIENT
Start: 2022-10-17 | End: 2022-10-21

## 2022-10-17 RX ORDER — ENOXAPARIN SODIUM 100 MG/ML
30 INJECTION SUBCUTANEOUS DAILY
Status: DISCONTINUED | OUTPATIENT
Start: 2022-10-17 | End: 2022-10-17

## 2022-10-17 RX ORDER — SODIUM CHLORIDE 0.9 % (FLUSH) 0.9 %
3 SYRINGE (ML) INJECTION EVERY 8 HOURS
Status: DISCONTINUED | OUTPATIENT
Start: 2022-10-17 | End: 2022-10-18

## 2022-10-17 RX ORDER — CLINDAMYCIN PHOSPHATE 150 MG/ML
INJECTION, SOLUTION INTRAVENOUS PRN
Status: DISCONTINUED | OUTPATIENT
Start: 2022-10-17 | End: 2022-10-17 | Stop reason: SDUPTHER

## 2022-10-17 RX ORDER — CHLORHEXIDINE GLUCONATE 0.12 MG/ML
15 RINSE ORAL 2 TIMES DAILY
Status: DISCONTINUED | OUTPATIENT
Start: 2022-10-17 | End: 2022-10-18

## 2022-10-17 RX ORDER — ACETAMINOPHEN 325 MG/1
650 TABLET ORAL EVERY 6 HOURS PRN
Status: DISCONTINUED | OUTPATIENT
Start: 2022-10-17 | End: 2022-10-17

## 2022-10-17 RX ORDER — POTASSIUM CHLORIDE 7.45 MG/ML
10 INJECTION INTRAVENOUS PRN
Status: DISCONTINUED | OUTPATIENT
Start: 2022-10-17 | End: 2022-10-24 | Stop reason: HOSPADM

## 2022-10-17 RX ORDER — POLYETHYLENE GLYCOL 3350 17 G/17G
17 POWDER, FOR SOLUTION ORAL DAILY PRN
Status: DISCONTINUED | OUTPATIENT
Start: 2022-10-17 | End: 2022-10-18

## 2022-10-17 RX ORDER — CIPROFLOXACIN 2 MG/ML
400 INJECTION, SOLUTION INTRAVENOUS EVERY 24 HOURS
Status: COMPLETED | OUTPATIENT
Start: 2022-10-18 | End: 2022-10-21

## 2022-10-17 RX ORDER — SODIUM CHLORIDE 9 MG/ML
INJECTION, SOLUTION INTRAVENOUS PRN
Status: DISCONTINUED | OUTPATIENT
Start: 2022-10-17 | End: 2022-10-24 | Stop reason: HOSPADM

## 2022-10-17 RX ORDER — FENTANYL CITRATE 50 UG/ML
INJECTION, SOLUTION INTRAMUSCULAR; INTRAVENOUS PRN
Status: DISCONTINUED | OUTPATIENT
Start: 2022-10-17 | End: 2022-10-17 | Stop reason: SDUPTHER

## 2022-10-17 RX ORDER — POTASSIUM CHLORIDE 20 MEQ/1
40 TABLET, EXTENDED RELEASE ORAL PRN
Status: DISCONTINUED | OUTPATIENT
Start: 2022-10-17 | End: 2022-10-24 | Stop reason: HOSPADM

## 2022-10-17 RX ORDER — FENTANYL CITRATE 50 UG/ML
50 INJECTION, SOLUTION INTRAMUSCULAR; INTRAVENOUS ONCE
Status: COMPLETED | OUTPATIENT
Start: 2022-10-17 | End: 2022-10-17

## 2022-10-17 RX ORDER — GINSENG 100 MG
CAPSULE ORAL
Status: DISCONTINUED
Start: 2022-10-17 | End: 2022-10-18

## 2022-10-17 RX ORDER — MORPHINE SULFATE 2 MG/ML
2 INJECTION, SOLUTION INTRAMUSCULAR; INTRAVENOUS
Status: DISCONTINUED | OUTPATIENT
Start: 2022-10-17 | End: 2022-10-24 | Stop reason: HOSPADM

## 2022-10-17 RX ORDER — CIPROFLOXACIN 2 MG/ML
400 INJECTION, SOLUTION INTRAVENOUS EVERY 12 HOURS
Status: DISCONTINUED | OUTPATIENT
Start: 2022-10-17 | End: 2022-10-17

## 2022-10-17 RX ORDER — SODIUM CHLORIDE, SODIUM LACTATE, POTASSIUM CHLORIDE, CALCIUM CHLORIDE 600; 310; 30; 20 MG/100ML; MG/100ML; MG/100ML; MG/100ML
INJECTION, SOLUTION INTRAVENOUS CONTINUOUS PRN
Status: DISCONTINUED | OUTPATIENT
Start: 2022-10-17 | End: 2022-10-17 | Stop reason: SDUPTHER

## 2022-10-17 RX ORDER — LIDOCAINE HYDROCHLORIDE 10 MG/ML
INJECTION, SOLUTION INFILTRATION; PERINEURAL PRN
Status: DISCONTINUED | OUTPATIENT
Start: 2022-10-17 | End: 2022-10-17 | Stop reason: SDUPTHER

## 2022-10-17 RX ORDER — METRONIDAZOLE 500 MG/100ML
500 INJECTION, SOLUTION INTRAVENOUS ONCE
Status: COMPLETED | OUTPATIENT
Start: 2022-10-17 | End: 2022-10-17

## 2022-10-17 RX ORDER — ONDANSETRON 4 MG/1
4 TABLET, ORALLY DISINTEGRATING ORAL EVERY 8 HOURS PRN
Status: DISCONTINUED | OUTPATIENT
Start: 2022-10-17 | End: 2022-10-24 | Stop reason: HOSPADM

## 2022-10-17 RX ORDER — SODIUM CHLORIDE 0.9 % (FLUSH) 0.9 %
5-40 SYRINGE (ML) INJECTION EVERY 12 HOURS SCHEDULED
Status: DISCONTINUED | OUTPATIENT
Start: 2022-10-17 | End: 2022-10-24 | Stop reason: HOSPADM

## 2022-10-17 RX ORDER — CIPROFLOXACIN 2 MG/ML
400 INJECTION, SOLUTION INTRAVENOUS ONCE
Status: COMPLETED | OUTPATIENT
Start: 2022-10-17 | End: 2022-10-17

## 2022-10-17 RX ORDER — CLINDAMYCIN PHOSPHATE 900 MG/50ML
INJECTION INTRAVENOUS
Status: DISCONTINUED
Start: 2022-10-17 | End: 2022-10-18

## 2022-10-17 RX ORDER — MAGNESIUM SULFATE 1 G/100ML
1000 INJECTION INTRAVENOUS PRN
Status: DISCONTINUED | OUTPATIENT
Start: 2022-10-17 | End: 2022-10-24 | Stop reason: HOSPADM

## 2022-10-17 RX ORDER — GINSENG 100 MG
CAPSULE ORAL PRN
Status: DISCONTINUED | OUTPATIENT
Start: 2022-10-17 | End: 2022-10-17 | Stop reason: ALTCHOICE

## 2022-10-17 RX ADMIN — FENTANYL CITRATE 50 MCG: 50 INJECTION, SOLUTION INTRAMUSCULAR; INTRAVENOUS at 18:48

## 2022-10-17 RX ADMIN — SODIUM CHLORIDE, POTASSIUM CHLORIDE, SODIUM LACTATE AND CALCIUM CHLORIDE: 600; 310; 30; 20 INJECTION, SOLUTION INTRAVENOUS at 19:10

## 2022-10-17 RX ADMIN — Medication 3 ML: at 07:53

## 2022-10-17 RX ADMIN — ONDANSETRON 4 MG: 2 INJECTION INTRAMUSCULAR; INTRAVENOUS at 07:02

## 2022-10-17 RX ADMIN — Medication 80 ML: at 07:29

## 2022-10-17 RX ADMIN — LIDOCAINE HYDROCHLORIDE 40 MG: 10 INJECTION, SOLUTION INFILTRATION; PERINEURAL at 15:32

## 2022-10-17 RX ADMIN — SODIUM CHLORIDE: 9 INJECTION, SOLUTION INTRAVENOUS at 19:20

## 2022-10-17 RX ADMIN — FENTANYL CITRATE 50 MCG: 50 INJECTION, SOLUTION INTRAMUSCULAR; INTRAVENOUS at 08:38

## 2022-10-17 RX ADMIN — PROPOFOL 5 MCG/KG/MIN: 10 INJECTION, EMULSION INTRAVENOUS at 18:54

## 2022-10-17 RX ADMIN — Medication 50 MCG: at 15:32

## 2022-10-17 RX ADMIN — SODIUM CHLORIDE, PRESERVATIVE FREE 10 ML: 5 INJECTION INTRAVENOUS at 10:55

## 2022-10-17 RX ADMIN — SODIUM CHLORIDE 500 ML: 9 INJECTION, SOLUTION INTRAVENOUS at 07:21

## 2022-10-17 RX ADMIN — METRONIDAZOLE 500 MG: 500 INJECTION, SOLUTION INTRAVENOUS at 10:04

## 2022-10-17 RX ADMIN — CLINDAMYCIN PHOSPHATE 900 MG: 150 INJECTION, SOLUTION INTRAMUSCULAR; INTRAVENOUS at 16:30

## 2022-10-17 RX ADMIN — PROPOFOL 100 MG: 10 INJECTION, EMULSION INTRAVENOUS at 15:32

## 2022-10-17 RX ADMIN — Medication 100 MCG: at 16:55

## 2022-10-17 RX ADMIN — FENTANYL CITRATE 50 MCG: 50 INJECTION, SOLUTION INTRAMUSCULAR; INTRAVENOUS at 15:32

## 2022-10-17 RX ADMIN — CHLORHEXIDINE GLUCONATE 0.12% ORAL RINSE 15 ML: 1.2 LIQUID ORAL at 23:21

## 2022-10-17 RX ADMIN — FENTANYL CITRATE 50 MCG: 50 INJECTION, SOLUTION INTRAMUSCULAR; INTRAVENOUS at 17:16

## 2022-10-17 RX ADMIN — Medication 100 MCG: at 15:56

## 2022-10-17 RX ADMIN — IOPAMIDOL 75 ML: 755 INJECTION, SOLUTION INTRAVENOUS at 07:27

## 2022-10-17 RX ADMIN — METRONIDAZOLE 500 MG: 500 INJECTION, SOLUTION INTRAVENOUS at 19:23

## 2022-10-17 RX ADMIN — Medication 100 MCG: at 17:02

## 2022-10-17 RX ADMIN — Medication 3 ML: at 14:22

## 2022-10-17 RX ADMIN — SODIUM CHLORIDE, POTASSIUM CHLORIDE, SODIUM LACTATE AND CALCIUM CHLORIDE: 600; 310; 30; 20 INJECTION, SOLUTION INTRAVENOUS at 19:00

## 2022-10-17 RX ADMIN — MORPHINE SULFATE 4 MG: 4 INJECTION INTRAVENOUS at 07:21

## 2022-10-17 RX ADMIN — MORPHINE SULFATE 4 MG: 4 INJECTION INTRAVENOUS at 07:03

## 2022-10-17 RX ADMIN — SODIUM CHLORIDE 500 ML: 9 INJECTION, SOLUTION INTRAVENOUS at 08:38

## 2022-10-17 RX ADMIN — CIPROFLOXACIN 400 MG: 2 INJECTION, SOLUTION INTRAVENOUS at 08:42

## 2022-10-17 RX ADMIN — SODIUM CHLORIDE, PRESERVATIVE FREE 10 ML: 5 INJECTION INTRAVENOUS at 07:27

## 2022-10-17 RX ADMIN — SODIUM CHLORIDE, POTASSIUM CHLORIDE, SODIUM LACTATE AND CALCIUM CHLORIDE: 600; 310; 30; 20 INJECTION, SOLUTION INTRAVENOUS at 15:32

## 2022-10-17 RX ADMIN — Medication 200 MCG: at 17:07

## 2022-10-17 RX ADMIN — FENTANYL CITRATE 50 MCG: 50 INJECTION, SOLUTION INTRAMUSCULAR; INTRAVENOUS at 16:38

## 2022-10-17 RX ADMIN — ROCURONIUM BROMIDE 35 MG: 10 INJECTION, SOLUTION INTRAVENOUS at 15:32

## 2022-10-17 RX ADMIN — FENTANYL CITRATE 50 MCG: 50 INJECTION, SOLUTION INTRAMUSCULAR; INTRAVENOUS at 16:42

## 2022-10-17 RX ADMIN — ROCURONIUM BROMIDE 50 MG: 10 INJECTION, SOLUTION INTRAVENOUS at 17:55

## 2022-10-17 ASSESSMENT — ENCOUNTER SYMPTOMS
SORE THROAT: 0
ABDOMINAL PAIN: 1
SHORTNESS OF BREATH: 0
NAUSEA: 1
COUGH: 0
ABDOMINAL DISTENTION: 1
BACK PAIN: 0
CONSTIPATION: 1

## 2022-10-17 ASSESSMENT — PAIN SCALES - PAIN ASSESSMENT IN ADVANCED DEMENTIA (PAINAD)
NEGVOCALIZATION: 0
NEGVOCALIZATION: 0
TOTALSCORE: 0
BREATHING: 0
BODYLANGUAGE: 0
CONSOLABILITY: 0
BREATHING: 0
BODYLANGUAGE: 0
TOTALSCORE: 0
FACIALEXPRESSION: 0
CONSOLABILITY: 0
FACIALEXPRESSION: 0

## 2022-10-17 ASSESSMENT — PULMONARY FUNCTION TESTS
PIF_VALUE: 17
PIF_VALUE: 20
PIF_VALUE: 19
PIF_VALUE: 18
PIF_VALUE: 17
PIF_VALUE: 18
PIF_VALUE: 19
PIF_VALUE: 17
PIF_VALUE: 18
PIF_VALUE: 17

## 2022-10-17 ASSESSMENT — PAIN - FUNCTIONAL ASSESSMENT
PAIN_FUNCTIONAL_ASSESSMENT: 0-10
PAIN_FUNCTIONAL_ASSESSMENT: PREVENTS OR INTERFERES SOME ACTIVE ACTIVITIES AND ADLS
PAIN_FUNCTIONAL_ASSESSMENT: 0-10

## 2022-10-17 ASSESSMENT — PAIN DESCRIPTION - LOCATION: LOCATION: ABDOMEN

## 2022-10-17 ASSESSMENT — PAIN DESCRIPTION - FREQUENCY: FREQUENCY: CONTINUOUS

## 2022-10-17 ASSESSMENT — PAIN DESCRIPTION - PAIN TYPE: TYPE: ACUTE PAIN

## 2022-10-17 ASSESSMENT — PAIN SCALES - GENERAL: PAINLEVEL_OUTOF10: 10

## 2022-10-17 NOTE — PROGRESS NOTES
Occupational [address]  Occupational Therapy Not Seen Note    DATE: 10/17/2022    NAME: Marla Wasserman  MRN: [id number]   : 1932      Patient not seen this date for Occupational Therapy due to:      Surgery/Procedure:  Pt to OR with general surgery at this time for laparotomy exploratory      Next Scheduled Treatment: Will check back 10/18/2022 as able     Electronically signed by Norman Cordon OT on 10/17/2022 at 2:40 PM

## 2022-10-17 NOTE — CONSULTS
[address]      Patient's Name/ Date of Birth/ Gender: Marya Wong / 7/30/1932 (80 y.o.) / female     Referring Physician: Christopher Bah DO    Consulting Physician: Dr. Isaura Avalos     CC: abdominal pain    History of present Illness: Marya Wong is a 80 y.o. female, presented to ED earlier today with complaint of worsening abdominal pain over the past several days. Patient with baseline dementia, her daughter is at bedside and provides all the relevant clinical history. No prior history of this, patient reportedly with onset of abdominal pain several days ago that is gotten slowly worse and worst this morning, patient has received morphine and fentanyl with some relief. Past Medical History:  has a past medical history of Atrial fibrillation, Back pain, chronic, Dementia ([address] GERD (gastroesophageal reflux disease), Hiatal hernia, History of breast cancer, History of glaucoma, Hypertension, benign essential, goal below 140/90, and Hypothyroid. Past Surgical History:   Past Surgical History:   Procedure Laterality Date    BLADDER REPAIR      BREAST SURGERY      left mastectomy    CATARACT REMOVAL WITH IMPLANT      CHOLECYSTECTOMY         Social History:  reports that she has never smoked. She has never used smokeless tobacco. She reports that she does not drink alcohol and does not use drugs. Family History: family history includes No Known Problems in her father and mother.     Review of Systems:   Reports abdominal pain, remainder of review of systems difficult to obtain due to patient condition    Allergies: Latex, Penicillins, and Tylenol [acetaminophen]    Current Meds:  Current Facility-Administered Medications:     [COMPLETED] Saline lock IV, , , Continuous **AND** sodium chloride flush 0.9 % injection 3 mL, 3 mL, IntraVENous, Q8H, Lul Bernstein MD, 3 mL at 10/17/22 0753    sodium chloride flush 0.9 % injection 10 mL, 10 mL, IntraVENous, PRN, Sheri Cao MD, 10 mL at 10/17/22 0727    0.9 % sodium chloride bolus, 80 mL, IntraVENous, Once, Sheri Cao MD, Stopped at 10/17/22 0730    sodium chloride flush 0.9 % injection 5-40 mL, 5-40 mL, IntraVENous, 2 times per day, Li Goins DO, 10 mL at 10/17/22 1055    sodium chloride flush 0.9 % injection 10 mL, 10 mL, IntraVENous, PRN, Li Spina, DO    0.9 % sodium chloride infusion, , IntraVENous, PRN, Li Spina, DO    potassium chloride (KLOR-CON M) extended release tablet 40 mEq, 40 mEq, Oral, PRN **OR** potassium bicarb-citric acid (EFFER-K) effervescent tablet 40 mEq, 40 mEq, Oral, PRN **OR** potassium chloride 10 mEq/100 mL IVPB (Peripheral Line), 10 mEq, IntraVENous, PRN, Li Spina, DO    magnesium sulfate 1000 mg in dextrose 5% 100 mL IVPB, 1,000 mg, IntraVENous, PRN, Li Spina, DO    enoxaparin Sodium (LOVENOX) injection 30 mg, 30 mg, SubCUTAneous, Daily, Evaristo Angel DO    ondansetron (ZOFRAN-ODT) disintegrating tablet 4 mg, 4 mg, Oral, Q8H PRN **OR** ondansetron (ZOFRAN) injection 4 mg, 4 mg, IntraVENous, Q6H PRN, Li Bojorqueza, DO    polyethylene glycol (GLYCOLAX) packet 17 g, 17 g, Oral, Daily PRN, iL Spina, DO    acetaminophen (TYLENOL) tablet 650 mg, 650 mg, Oral, Q6H PRN **OR** acetaminophen (TYLENOL) suppository 650 mg, 650 mg, Rectal, Q6H PRN, Li Bojorqueza, DO    morphine (PF) injection 2 mg, 2 mg, IntraVENous, Q2H PRN **OR** morphine injection 4 mg, 4 mg, IntraVENous, Q2H PRN, Li Bojorqueza, DO    metronidazole (FLAGYL) 500 mg in 0.9% NaCl 100 mL IVPB premix, 500 mg, IntraVENous, Q8H, Evaristo Angel DO    [START ON 10/18/2022] ciprofloxacin (CIPRO) IVPB 400 mg, 400 mg, IntraVENous, Q24H, Li Goins DO    Vital Signs:  Vitals:    10/17/22 1000   BP: (!) 118/59   Pulse: 80   Resp: 24   Temp:    SpO2: 94%       Physical Exam:  Vital signs and Nurse's note reviewed    General Appearance:  awake, alert, no acute distress, well developed, well nourished   Skin:  Skin color, texture, turgor normal. No rashes or lesions. Head/face:  NCAT, face symmetrical  Eyes:  PERRL, no evidence of conjunctivitis or ptosis bilaterally  Ears:  External ears and canals grossly normal, no evidence of otorrhea. Nose/Sinuses:  Nares normal. Septum midline. Mucosa normal. No external drainage noted. Mouth/Neck:  Mucosa moist.  No external oral lesions. Trachea midline. No visible masses. Lungs:  Normal chest expansion, unlabored breathing without accessory muscle use. No audible rales, rhonchi, or wheezing. Cardiovascular: S1S2. No evidence of JVD. No evidence of pulsatile masses in abdomen  Abdomen:  Soft, distended, somewhat tense without tympany although tender to palpation out of proportion to exam  Musculoskeletal: No evidence of bony/muscular deformities, trauma, atrophy of either left/right upper/lower extremity. No evidence of digital clubbing or cyanosis. Neurologic:  CN 2-12 grossly intact without obvious deficits. Grossly normal sensation in all extremities. Labs:   CBC:   Recent Labs     10/17/22  0655   WBC 5.5   HGB 14.3        BMP:    Recent Labs     10/17/22  0655      K 4.2      CO2 22   BUN 17   CREATININE 1.02*   GLUCOSE 138*     Hepatic:   Recent Labs     10/17/22  0655   AST 20   ALT 9   ALKPHOS 132*   BILITOT 0.5   LIPASE 22     Coagulation:   Recent Labs     10/14/22  1542 10/17/22  0655   APTT  --  34.9*   PROT  --  7.5   INR 2.6 2.8         Imaging:  CT ABDOMEN PELVIS W IV CONTRAST Additional Contrast? None    Result Date: 10/17/2022  EXAMINATION: CT OF THE ABDOMEN AND PELVIS WITH CONTRAST 10/17/2022 7:20 am TECHNIQUE: CT of the abdomen and pelvis was performed with the administration of intravenous contrast. Multiplanar reformatted images are provided for review.  Automated exposure control, iterative reconstruction, and/or weight based adjustment of the mA/kV was utilized to reduce the radiation dose to as low as reasonably achievable. COMPARISON: 01/06/2020 HISTORY: ORDERING SYSTEM PROVIDED HISTORY: abdominal pain, distension TECHNOLOGIST PROVIDED HISTORY: abdominal pain, distension Decision Support Exception - unselect if not a suspected or confirmed emergency medical condition->Emergency Medical Condition (MA) Reason for Exam: pt c/o abdominal pain, distenstion, constipation and nausea x 2 days FINDINGS: Lower Chest: The visualized heart and lungs show no acute abnormalities. Organs: Cholecystectomy. Some reservoir effect biliary ductal dilatation noted. No focal liver lesion. Pancreas atrophic. Spleen, adrenal glands and kidneys show no significant abnormalities. GI/Bowel: There is limited evaluation due to absence of oral contrast. Small sliding hiatal hernia otherwise stomach unremarkable. In the mid abdomen from series 2 image  there are some central anterior abdomen small bowel loops which are borderline dilated. On image 106 and 102, there are 2 small pockets of gas along the posterior side of the bowel which is worrisome for extraluminal gas and would suggest a contained perforation. The bowel wall is not particularly thickened. Presence of free fluid and haziness is difficult to determine whether there is a inflammatory change around the involved bowel loops. No evidence for appendicitis. No acute process in the colon. Pelvis: Hysterectomy. Urinary bladder grossly normal. Peritoneum/Retroperitoneum: Interval development of small volume ascites. No significant lymphadenopathy. Atherosclerotic disease. Bones/Soft Tissues: No acute bony abnormality. Fat containing right inguinal hernia. 1. Interval development of ascites. Overall small volume. 2. In the anterior mid abdomen some loops of small bowel borderline dilated with 2 small gas pockets along the posterior aspect worrisome for extraluminal gas secondary to contained perforation.  3. No bowel obstruction. Findings were discussed with Dr Brit Rivera at 8:18 am on 10/17/2022. Assessment:    Ksenia Soler is a 80 y.o. female with     Abnormal CT abd/pelv concerning for perforated viscus  Multiple comorbidities    Plan:    I had a long discussion with the patient's daughter regarding treatment options moving forward. Clinical picture in correlation with CT findings may suggest new small bowel perforation with spillage of enteric content, the patient's white blood cell count appears consistent with her baseline although may not accurately represent ongoing intra-abdominal pathology. Patient does have lactic acidosis in the absence of any known liver disease which may be representative of early and/or worsening abdominal process. Cipro Flagyl have been started, maintain pain control with IV medications for now, other members of the patient's family are attempted to arrive to the hospital, my recommendation is to proceed to the operating room at this point given potentially worsening intra-abdominal sepsis given the clinical findings. We discussed end-of-life care, the patient and her daughter are in agreement to make the patient DNR CCA status and are agreeable to proceed to the operating room in spite of this code status. Will ask the ED to admit the patient to the medicine service, re-evaluate patient once she is on the inpatient floor.   NPO for now        Sam Vegas,   10/17/2022

## 2022-10-17 NOTE — ED PROVIDER NOTES
[facility] ENCOUNTER      Pt Name: Pham Mcclure  MRN: [id number]  [place] 7/30/1932  Date of evaluation: 10/17/22      CHIEF COMPLAINT     No chief complaint on file. HISTORY OF PRESENT ILLNESS    Pham Mcclure is a 80 y.o. female who presents to the emergency department accompanied by her family for evaluation of abdominal pain, distention, nausea and constipation since last 2 days, worse this morning. She denies any vomiting, melena or hematochezia and denies any fever or chills. She also denies any urinary frequency, urgency, dysuria or hematuria. Abdominal pain does not radiate to the chest or the back. Patient has history of similar symptoms several years ago when she was diagnosed to have bowel obstruction. Her previous abdominal surgery includes cholecystectomy. She denies any cough, sore throat, runny nose, chest pain, shortness of breath, palpitations or diaphoresis. There are no exacerbating or relieving factors and patient has not taken any medications for the above symptoms. REVIEW OF SYSTEMS       Review of Systems   Constitutional:  Negative for chills and fever. HENT:  Negative for ear pain and sore throat. Respiratory:  Negative for cough and shortness of breath. Cardiovascular:  Negative for chest pain and palpitations. Gastrointestinal:  Positive for abdominal distention, abdominal pain, constipation and nausea. Genitourinary:  Negative for dysuria and frequency. Musculoskeletal:  Negative for back pain. Neurological:  Negative for dizziness, numbness and headaches. All other systems reviewed and are negative. PAST MEDICAL HISTORY    has a past medical history of Atrial fibrillation, Back pain, chronic, Dementia ([address] GERD (gastroesophageal reflux disease), Hiatal hernia, History of breast cancer, History of glaucoma, Hypertension, benign essential, goal below 140/90, and Hypothyroid.     SURGICAL HISTORY      has a past surgical history that includes Cholecystectomy; Breast surgery; Cataract removal with implant; and bladder repair. CURRENT MEDICATIONS       Previous Medications    AMLODIPINE (NORVASC) 5 MG TABLET    TAKE 1 TABLET BY MOUTH EVERY DAY    ASCORBIC ACID (VITAMIN C) 500 MG TABLET    TAKE 1 TABLET BY MOUTH EVERY DAY    ATENOLOL (TENORMIN) 50 MG TABLET    TAKE 1 TABLET BY MOUTH EVERY DAY    DONEPEZIL (ARICEPT) 5 MG TABLET    TAKE (1) TABLET BY MOUTH NIGHTLY    DOXAZOSIN (CARDURA) 1 MG TABLET    TAKE 1 TABLET BY MOUTH EVERY DAY    FERROUS SULFATE (FEROSUL) 325 (65 FE) MG TABLET    TAKE 1 TABLET BY MOUTH DAILY WITH BREAKFAST    HANDICAP PLACARD MISC    by Does not apply route 5 years, cannot walk over 200 ft. LATANOPROST (XALATAN) 0.005 % OPHTHALMIC SOLUTION    Apply to eye daily    LEVOTHYROXINE (SYNTHROID) 100 MCG TABLET    TAKE 1 TABLET BY MOUTH ONCE DAILY    MIRTAZAPINE (REMERON) 15 MG TABLET    TAKE 1 TABLET BY MOUTH NIGHTLY    MULTIPLE VITAMINS-MINERALS (ICAPS AREDS 2) CAPS    Take 1 tablet by mouth 2 times daily     MULTIPLE VITAMINS-MINERALS (PRESERVISION AREDS 2) CAPS    Take 1 capsule by mouth daily    OMEPRAZOLE (PRILOSEC) 20 MG DELAYED RELEASE CAPSULE    TAKE 1 CAPSULE BY MOUTH ONCE DAILY BEFORE A MEAL    POTASSIUM CHLORIDE (MICRO-K) 10 MEQ EXTENDED RELEASE CAPSULE    TAKE 1 CAPSULE BY MOUTH TWICE DAILY WITH FOOD    TIMOLOL (TIMOPTIC) 0.5 % OPHTHALMIC SOLUTION    instill 1 drop into both eyes twice a day    TRAMADOL (ULTRAM) 50 MG TABLET    TAKE 1 TABLET BY MOUTH EVERY 8 HOURS AS NEEDED FOR PAIN FOR UP TO 30 DAYS *REDUCE DOSES TAKEN AS PAIN BECOMES MANAGEABLE*    WARFARIN (COUMADIN) 3 MG TABLET    1.5 tabs Tuesday and Saturday, one tab daily rest of week or as directed       ALLERGIES     is allergic to latex, penicillins, and tylenol [acetaminophen]. FAMILY HISTORY     She indicated that her mother is . She indicated that her father is .      family history includes No Known Problems in her father and mother. SOCIAL HISTORY      reports that she has never smoked. She has never used smokeless tobacco. She reports that she does not drink alcohol and does not use drugs. PHYSICAL EXAM     INITIAL VITALS:  vitals were not taken for this visit. Physical Exam  Vitals and nursing note reviewed. Constitutional:       General: She is not in acute distress. Appearance: She is well-developed. HENT:      Head: Normocephalic and atraumatic. Nose: Nose normal.   Eyes:      Extraocular Movements: Extraocular movements intact. Pupils: Pupils are equal, round, and reactive to light. Cardiovascular:      Rate and Rhythm: Normal rate and regular rhythm. Heart sounds: Normal heart sounds. No murmur heard. Pulmonary:      Effort: Pulmonary effort is normal. No respiratory distress. Breath sounds: Normal breath sounds. Abdominal:      General: Bowel sounds are decreased. There is distension. There is no abdominal bruit. Palpations: Abdomen is soft. There is no pulsatile mass. Tenderness: There is generalized abdominal tenderness. There is guarding. Negative signs include McBurney's sign. Musculoskeletal:      Cervical back: Normal range of motion and neck supple. Skin:     General: Skin is warm and dry. Neurological:      General: No focal deficit present. Mental Status: She is alert and oriented to person, place, and time. Psychiatric:         Behavior: Behavior normal.         Thought Content: Thought content normal.         DIFFERENTIAL DIAGNOSIS/ MDM:     Cholecystitis, appendicitis, pancreatitis,  urinary tract infection, renal stone, small bowel obstruction,diverticulitis, gastritis, enteritis, colitis.      DIAGNOSTIC RESULTS     EKG: All EKG's are interpreted by the Emergency Department Physician who either signs or Co-signs this chart in the absence of a cardiologist.    EKG Interpretation    Interpreted by emergency department physician    Rhythm: normal sinus   Rate: normal  Axis: normal  Conduction: normal  ST Segments: no acute change  T Waves: no acute change  Q Waves: no acute change    Clinical Impression: no acute change, but this is a nonspecific EKG. RADIOLOGY:   Interpretation per the Radiologist below, if available at the time of this note:    No results found. LABS:  No results found for this visit on 10/17/22. Labs pending at this time    EMERGENCY DEPARTMENT COURSE:   Vitals: There were no vitals filed for this visit.  -------------------------   ,  ,  ,      Orders Placed This Encounter   Medications    sodium chloride flush 0.9 % injection 3 mL    0.9 % sodium chloride bolus    morphine injection 4 mg    ondansetron (ZOFRAN) injection 4 mg         During the ED course, normal saline 500 mL bolus, morphine 4 mg and Zofran 4 mg IV push were ordered. Case will be turned over to oncoming physician at 7 AM due to shift change      PROCEDURES:  None    FINAL IMPRESSION    No diagnosis found. DISPOSITION/PLAN       PATIENT REFERRED TO:  No follow-up provider specified. DISCHARGE MEDICATIONS:  New Prescriptions    No medications on file       (Please note that portions of this note were completed with a voice recognition program.  Efforts were made to edit the dictations but occasionally words are mis-transcribed.)    Suzan Schaeffer MD,, MD, F.A.C.E.P.   Attending Emergency Medicine Physician       Suzan Schaeffer MD  10/17/22 8320 [facility] Thomas Mora MD  10/17/22 5564

## 2022-10-17 NOTE — PROGRESS NOTES
Updated family on procedure start and patient condition. Spoke with Erica Norwood, child of patient.

## 2022-10-17 NOTE — ED PROVIDER NOTES
ADDENDUM:        The patient was seen for Abdominal Pain  . The patient's initial evaluation and plan have been discussed with the prior provider who initially evaluated the patient. Nursing Notes, Past Medical Hx, Past Surgical Hx, Social Hx, Allergies, and Family Hx were all reviewed. PAST MEDICAL HISTORY    has a past medical history of Atrial fibrillation, Back pain, chronic, Dementia ([address] GERD (gastroesophageal reflux disease), Hiatal hernia, History of breast cancer, History of glaucoma, Hypertension, benign essential, goal below 140/90, and Hypothyroid. SURGICAL HISTORY      has a past surgical history that includes Cholecystectomy; Breast surgery; Cataract removal with implant; and bladder repair. CURRENT MEDICATIONS       Previous Medications    AMLODIPINE (NORVASC) 5 MG TABLET    TAKE 1 TABLET BY MOUTH EVERY DAY    ASCORBIC ACID (VITAMIN C) 500 MG TABLET    TAKE 1 TABLET BY MOUTH EVERY DAY    ATENOLOL (TENORMIN) 50 MG TABLET    TAKE 1 TABLET BY MOUTH EVERY DAY    DONEPEZIL (ARICEPT) 5 MG TABLET    TAKE (1) TABLET BY MOUTH NIGHTLY    DOXAZOSIN (CARDURA) 1 MG TABLET    TAKE 1 TABLET BY MOUTH EVERY DAY    FERROUS SULFATE (FEROSUL) 325 (65 FE) MG TABLET    TAKE 1 TABLET BY MOUTH DAILY WITH BREAKFAST    HANDICAP PLACARD MISC    by Does not apply route 5 years, cannot walk over 200 ft.     LATANOPROST (XALATAN) 0.005 % OPHTHALMIC SOLUTION    Apply to eye daily    LEVOTHYROXINE (SYNTHROID) 100 MCG TABLET    TAKE 1 TABLET BY MOUTH ONCE DAILY    MIRTAZAPINE (REMERON) 15 MG TABLET    TAKE 1 TABLET BY MOUTH NIGHTLY    MULTIPLE VITAMINS-MINERALS (ICAPS AREDS 2) CAPS    Take 1 tablet by mouth 2 times daily     MULTIPLE VITAMINS-MINERALS (PRESERVISION AREDS 2) CAPS    Take 1 capsule by mouth daily    OMEPRAZOLE (PRILOSEC) 20 MG DELAYED RELEASE CAPSULE    TAKE 1 CAPSULE BY MOUTH ONCE DAILY BEFORE A MEAL    POTASSIUM CHLORIDE (MICRO-K) 10 MEQ EXTENDED RELEASE CAPSULE    TAKE 1 CAPSULE BY MOUTH TWICE DAILY WITH FOOD    TIMOLOL (TIMOPTIC) 0.5 % OPHTHALMIC SOLUTION    instill 1 drop into both eyes twice a day    TRAMADOL (ULTRAM) 50 MG TABLET    TAKE 1 TABLET BY MOUTH EVERY 8 HOURS AS NEEDED FOR PAIN FOR UP TO 30 DAYS *REDUCE DOSES TAKEN AS PAIN BECOMES MANAGEABLE*    WARFARIN (COUMADIN) 3 MG TABLET    1.5 tabs Tuesday and Saturday, one tab daily rest of week or as directed       ALLERGIES     is allergic to latex, penicillins, and tylenol [acetaminophen]. FAMILY HISTORY     She indicated that her mother is . She indicated that her father is . family history includes No Known Problems in her father and mother. SOCIAL HISTORY      reports that she has never smoked. She has never used smokeless tobacco. She reports that she does not drink alcohol and does not use drugs.     Diagnostic Results     EKG         LABS:   Results for orders placed or performed during the hospital encounter of 10/17/22   CBC with Diff   Result Value Ref Range    WBC 5.5 3.5 - 11.0 k/uL    RBC 4.58 4.0 - 5.2 m/uL    Hemoglobin 14.3 12.0 - 16.0 g/dL    Hematocrit 43.8 36 - 46 %    MCV 95.5 80 - 100 fL    MCH 31.3 26 - 34 pg    MCHC 32.7 31 - 37 g/dL    RDW 14.6 12.5 - 15.4 %    Platelets 556 660 - 387 k/uL    MPV 7.3 6.0 - 12.0 fL    Seg Neutrophils 31 (L) 36 - 66 %    Lymphocytes 56 (H) 24 - 44 %    Monocytes 10 (H) 1 - 7 %    Eosinophils % 3 1 - 4 %    Basophils 0 0 - 2 %    Segs Absolute 1.71 (L) 1.8 - 7.7 k/uL    Absolute Lymph # 3.07 1.0 - 4.8 k/uL    Absolute Mono # 0.55 0.1 - 0.8 k/uL    Absolute Eos # 0.17 0.0 - 0.4 k/uL    Basophils Absolute 0.00 0.0 - 0.2 k/uL    Morphology Normal    CMP   Result Value Ref Range    Glucose 138 (H) 70 - 99 mg/dL    BUN 17 8 - 23 mg/dL    Creatinine 1.02 (H) 0.50 - 0.90 mg/dL    Est, Glom Filt Rate 52 (L) >60 mL/min/1.73m2    Calcium 9.4 8.6 - 10.4 mg/dL    Sodium 139 135 - 144 mmol/L    Potassium 4.2 3.7 - 5.3 mmol/L    Chloride 104 98 - 107 mmol/L    CO2 22 20 - 31 mmol/L Anion Gap 13 9 - 17 mmol/L    Alkaline Phosphatase 132 (H) 35 - 104 U/L    ALT 9 5 - 33 U/L    AST 20 <32 U/L    Total Bilirubin 0.5 0.3 - 1.2 mg/dL    Total Protein 7.5 6.4 - 8.3 g/dL    Albumin 3.8 3.5 - 5.2 g/dL    Albumin/Globulin Ratio 1.0 1.0 - 2.5   Lipase   Result Value Ref Range    Lipase 22 13 - 60 U/L   Magnesium   Result Value Ref Range    Magnesium 2.0 1.6 - 2.6 mg/dL   Lactic Acid   Result Value Ref Range    Lactic Acid 3.7 (H) 0.5 - 2.2 mmol/L   APTT   Result Value Ref Range    PTT 34.9 (H) 21.3 - 31.3 sec   Protime-INR   Result Value Ref Range    Protime 27.4 (H) 9.4 - 12.6 sec    INR 2.8        RADIOLOGY:  CT ABDOMEN PELVIS W IV CONTRAST Additional Contrast? None   Final Result   1. Interval development of ascites. Overall small volume. 2. In the anterior mid abdomen some loops of small bowel borderline dilated   with 2 small gas pockets along the posterior aspect worrisome for   extraluminal gas secondary to contained perforation. 3. No bowel obstruction. Findings were discussed with Dr Keith Estrella at 8:18 am on 10/17/2022.                ED Course     The patient was given the following medications:  Orders Placed This Encounter   Medications    sodium chloride flush 0.9 % injection 3 mL    0.9 % sodium chloride bolus    morphine injection 4 mg    ondansetron (ZOFRAN) injection 4 mg    sodium chloride flush 0.9 % injection 10 mL    0.9 % sodium chloride bolus    iopamidol (ISOVUE-370) 76 % injection 75 mL    morphine injection 4 mg    ciprofloxacin (CIPRO) IVPB 400 mg     Order Specific Question:   Antimicrobial Indications     Answer:   Intra-Abdominal Infection    metronidazole (FLAGYL) 500 mg in 0.9% NaCl 100 mL IVPB premix     Order Specific Question:   Antimicrobial Indications     Answer:   Intra-Abdominal Infection    0.9 % sodium chloride bolus    fentaNYL (SUBLIMAZE) injection 50 mcg         RECENT VITALS:  /89   Pulse 76   Temp 97.7 °F (36.5 °C) (Oral)   Resp 20   Ht 4' 11\" (1.499 m)   Wt 54.9 kg (121 lb)   SpO2 96%   BMI 24.44 kg/m²     8:20 AM discussed with radiologist concern for microperforation. Will discuss with general surgery. Discussed with family as well. 8:35 AM discussed with Dr. Oralia Carranza he will evaluate the patient shortly. We will start IV antibiotics. 8:50 AM discussed with Luis Butler who agrees to admission. Patient hypotensive and in pain. Continuing IV fluids. 9:50 AM evaluated by Dr. Oralia Carranza patient will be made a CCA per family and patient's request.  She will be taken to the operating room today. OAR[facility] Report if indicated             I have reviewed the disposition diagnosis with the patient and or their family/guardian. I have answered their questions and given discharge instructions. They voiced understanding of these instructions and did not have any further questions or complaints. Disposition     CLINICAL IMPRESSION:  1. Abdominal pain, unspecified abdominal location    2. Bowel perforation ([address]        PATIENT REFERRED TO:  No follow-up provider specified. DISCHARGE MEDICATIONS:  New Prescriptions    No medications on file       DISPOSITION:   DISPOSITION Admitted 10/17/2022 09:00:24 AM      (Please note that portions of this note were completed with a voice recognition program.  Efforts were made to edit the dictations but occasionally words are mis-transcribed.)    Geraldine Carrington DO D.O.          Geraldine Carrington DO  10/17/22 0733

## 2022-10-17 NOTE — PROGRESS NOTES
Physical Therapy        Physical Therapy Cancel Note      DATE: 10/17/2022    NAME: Marya Wong  MRN: [id number]   : 1932      Patient not seen this date for Physical Therapy due to:    Surgery/Procedure: Laparotomy this afternoon      Electronically signed by Adelina Lewis PT on 10/17/2022 at 2:39 PM

## 2022-10-17 NOTE — ANESTHESIA POSTPROCEDURE EVALUATION
Department of Anesthesiology  Postprocedure Note    Patient: Eduardo Gomes  MRN: [id number]  YOB: 1932  Date of evaluation: 10/17/2022      Procedure Summary     Date: 10/17/22 Room / Location: [facility] [address] / [address] [place] [address]    Anesthesia Start: 3494 Anesthesia Stop: 4255    Procedure: LAPAROTOMY EXPLORATORY WITH SMALL BOWL RESECTION AND CENTRAL LINE PLACEMENT (Abdomen) Diagnosis:       Abdominal pain, unspecified abdominal location      Bowel perforation ([address]      (Abdominal pain, unspecified abdominal location [R10.9]; Bowel perforation ([address] [K63.1])    Surgeons: Jasiel Dacosta DO Responsible Provider: Nancy Peter MD    Anesthesia Type: general ASA Status: 4 - Emergent          Anesthesia Type: No value filed.     Rosenda Phase I: Rosenda Score: 9    Rosenda Phase II:        Anesthesia Post Evaluation    Patient location during evaluation: ICU  Patient participation: complete - patient cannot participate  Level of consciousness: sedated and ventilated  Airway patency: patent  Nausea & Vomiting: no nausea and no vomiting  Complications: no  Cardiovascular status: hemodynamically stable  Respiratory status: intubated and ventilator  Hydration status: euvolemic  Multimodal analgesia pain management approach

## 2022-10-17 NOTE — ANESTHESIA PRE PROCEDURE
Department of Anesthesiology  Preprocedure Note       Name:  Leandro Santo   Age:  80 y.o.  :  1932                                          MRN:  [id number]         Date:  10/17/2022      Surgeon: Chayo Brar):  Elena Delcid,     Procedure: Procedure(s):  LAPAROTOMY EXPLORATORY    Medications prior to admission:   Prior to Admission medications    Medication Sig Start Date End Date Taking?  Authorizing Provider   omeprazole (PRILOSEC) 20 MG delayed release capsule TAKE 1 CAPSULE BY MOUTH ONCE DAILY BEFORE A MEAL 22   Ciarra Vogel MD   potassium chloride (MICRO-K) 10 MEQ extended release capsule TAKE 1 CAPSULE BY MOUTH TWICE DAILY WITH FOOD 22   Ciarra Vogel MD   traMADol (ULTRAM) 50 MG tablet TAKE 1 TABLET BY MOUTH EVERY 8 HOURS AS NEEDED FOR PAIN FOR UP TO 30 DAYS *REDUCE DOSES TAKEN AS PAIN BECOMES MANAGEABLE* 9/26/22 10/26/22  Ciarra Vogel MD   Handicap Placard MISC by Does not apply route 5 years, cannot walk over 200 ft. 22   Ciarra Vogel MD   mirtazapine (REMERON) 15 MG tablet TAKE 1 TABLET BY MOUTH NIGHTLY 6/15/22   Ciarra Vogel MD   warfarin (COUMADIN) 3 MG tablet 1.5 tabs Tuesday and Saturday, one tab daily rest of week or as directed  Patient taking differently: Take 3 mg by mouth See Admin Instructions Dosed by [facility] Anticoagulation [facility]: 1.5 mg Tuesday/Saturday and 3 mg all other days 6/15/22   Ciarra Vogel MD   ascorbic acid (VITAMIN C) 500 MG tablet TAKE 1 TABLET BY MOUTH EVERY DAY 21   Ciarra Vogel MD   atenolol (TENORMIN) 50 MG tablet TAKE 1 TABLET BY MOUTH EVERY DAY 21   Ciarra Vogel MD   donepezil (ARICEPT) 5 MG tablet TAKE (1) TABLET BY MOUTH NIGHTLY 21   Ciarra Vogel MD   amLODIPine (NORVASC) 5 MG tablet TAKE 1 TABLET BY MOUTH EVERY DAY 21   Ciarra Vogel MD   doxazosin (CARDURA) 1 MG tablet TAKE 1 TABLET BY MOUTH EVERY DAY 21   Ciarra Vogel MD ferrous sulfate (FEROSUL) 325 (65 Fe) MG tablet TAKE 1 TABLET BY MOUTH DAILY WITH BREAKFAST 10/22/21   Evon Jorge MD   levothyroxine (SYNTHROID) 100 MCG tablet TAKE 1 TABLET BY MOUTH ONCE DAILY 10/22/21   Evon Jorge MD   Multiple Vitamins-Minerals (PRESERVISION AREDS 2) CAPS Take 1 capsule by mouth daily 7/10/20   Evon Jorge MD   Multiple Vitamins-Minerals (ICAPS AREDS 2) CAPS Take 1 tablet by mouth 2 times daily     Historical Provider, MD   timolol (TIMOPTIC) 0.5 % ophthalmic solution instill 1 drop into both eyes twice a day 11/18/16   Historical Provider, MD   latanoprost (XALATAN) 0.005 % ophthalmic solution Apply to eye daily 10/21/16   Historical Provider, MD       Current medications:    Current Facility-Administered Medications   Medication Dose Route Frequency Provider Last Rate Last Admin    sodium chloride flush 0.9 % injection 3 mL  3 mL IntraVENous Q8H Katarzyna Seth MD   3 mL at 10/17/22 1422    sodium chloride flush 0.9 % injection 10 mL  10 mL IntraVENous PRN Katarzyna Seth MD   10 mL at 10/17/22 0727    0.9 % sodium chloride bolus  80 mL IntraVENous Once Katarzyna Seth MD   Stopped at 10/17/22 0730    sodium chloride flush 0.9 % injection 5-40 mL  5-40 mL IntraVENous 2 times per day Shay Childs DO   10 mL at 10/17/22 1055    sodium chloride flush 0.9 % injection 10 mL  10 mL IntraVENous PRN Shay Mendozaon, DO        0.9 % sodium chloride infusion   IntraVENous PRN Soniaalou Amadeo, DO        potassium chloride (KLOR-CON M) extended release tablet 40 mEq  40 mEq Oral PRN Lindalou Amadeo, DO        Or    potassium bicarb-citric acid (EFFER-K) effervescent tablet 40 mEq  40 mEq Oral PRN Lindalou Amadeo, DO        Or    potassium chloride 10 mEq/100 mL IVPB (Peripheral Line)  10 mEq IntraVENous PRN Soniaalou Amadeo, DO        magnesium sulfate 1000 mg in dextrose 5% 100 mL IVPB  1,000 mg IntraVENous PRN oSniaalou Amadeo, DO        ondansetron (ZOFRAN-ODT) disintegrating tablet 4 mg  4 mg Oral Q8H PRN Evelyn Lakewood, DO        Or    [facility]dansetron [facility]) injection 4 mg  4 mg IntraVENous Q6H PRN Evelyn Lakewood, DO        polyethylene glycol (GLYCOLAX) packet 17 g  17 g Oral Daily PRN Evelyn Lakewood, DO        morphine (PF) injection 2 mg  2 mg IntraVENous Q2H PRN Evelyn Lakewood, DO        Or    morphine injection 4 mg  4 mg IntraVENous Q2H PRN Evelyn Lakewood, DO        metronidazole (FLAGYL) 500 mg in 0.9% NaCl 100 mL IVPB premix  500 mg IntraVENous Q8H Evaristo Angel DO        [START ON 10/18/2022] ciprofloxacin (CIPRO) IVPB 400 mg  400 mg IntraVENous Q24H Evelyn Lakewood, DO        tranexamic acid (CYKLOKAPRON) 1000 MG/10ML injection                Allergies: Allergies   Allergen Reactions    Latex     Penicillins     Tylenol [Acetaminophen]        Problem List:    Patient Active Problem List   Diagnosis Code    Hypertension, benign essential, goal below 140/90 I10    Back pain, chronic M54.9, G89.29    Hearing loss H91.90    Obesity (BMI 30.0-34. 9) E66.9    Hypothyroidism E03.9    Chronic atrial fibrillation (HCC) I48.20    History of breast cancer Z85.3    Dementia with behavioral disturbance F03.918    Abdominal pain R10.9    BMI 28.0-28.9,adult Z68.28    Hallucination, visual R44.1    Closed displaced fracture of middle phalanx of right middle finger S62.622A    Perforated viscus R19.8       Past Medical History:        Diagnosis Date    Atrial fibrillation 3/28/2014    Back pain, chronic 6/1/2015    Dementia ([facility]     GERD (gastroesophageal reflux disease)     Hiatal hernia     History of breast cancer 12/1/2015    History of glaucoma     Hypertension, benign essential, goal below 140/90 6/1/2015    Hypothyroid 6/1/2015       Past Surgical History:        Procedure Laterality Date    BLADDER REPAIR      BREAST SURGERY      left mastectomy    CATARACT REMOVAL WITH IMPLANT  CHOLECYSTECTOMY         Social History:    Social History     Tobacco Use    Smoking status: Never    Smokeless tobacco: Never   Substance Use Topics    Alcohol use: No     Alcohol/week: 0.0 standard drinks                                Counseling given: Not Answered      Vital Signs (Current):   Vitals:    10/17/22 1000 10/17/22 1109 10/17/22 1447 10/17/22 1452   BP: (!) 118/59 118/66 (!) 141/57    Pulse: 80 89 (!) 102    Resp: 24 18 17    Temp:  99.1 °F (37.3 °C) 100 °F (37.8 °C)    TempSrc:  Oral Temporal    SpO2: 94% (!) 89% (!) 88% 94%   Weight:       Height:                                                  BP Readings from Last 3 Encounters:   10/17/22 (!) 141/57   09/02/22 120/60   06/23/22 138/70       NPO Status: Time of last liquid consumption: 2345                        Time of last solid consumption: 2345                        Date of last liquid consumption: 10/16/22                        Date of last solid food consumption: 10/16/22    BMI:   Wt Readings from Last 3 Encounters:   10/17/22 121 lb (54.9 kg)   09/02/22 118 lb (53.5 kg)   06/23/22 115 lb 4 oz (52.3 kg)     Body mass index is 24.44 kg/m².     CBC:   Lab Results   Component Value Date/Time    WBC 5.5 10/17/2022 06:55 AM    RBC 4.58 10/17/2022 06:55 AM    RBC 4.49 01/05/2012 07:37 AM    HGB 14.3 10/17/2022 06:55 AM    HCT 43.8 10/17/2022 06:55 AM    MCV 95.5 10/17/2022 06:55 AM    RDW 14.6 10/17/2022 06:55 AM     10/17/2022 06:55 AM     01/05/2012 07:37 AM       CMP:   Lab Results   Component Value Date/Time     10/17/2022 06:55 AM    K 4.2 10/17/2022 06:55 AM     10/17/2022 06:55 AM    CO2 22 10/17/2022 06:55 AM    BUN 17 10/17/2022 06:55 AM    CREATININE 1.02 10/17/2022 06:55 AM    GFRAA >60 04/27/2022 09:37 PM    LABGLOM 52 10/17/2022 06:55 AM    GLUCOSE 138 10/17/2022 06:55 AM    GLUCOSE 102 01/05/2012 07:37 AM    PROT 7.5 10/17/2022 06:55 AM    CALCIUM 9.4 10/17/2022 06:55 AM    BILITOT 0.5 10/17/2022 06:55 AM    ALKPHOS 132 10/17/2022 06:55 AM    AST 20 10/17/2022 06:55 AM    ALT 9 10/17/2022 06:55 AM       POC Tests: No results for input(s): POCGLU, POCNA, POCK, POCCL, POCBUN, POCHEMO, POCHCT in the last 72 hours. Coags:   Lab Results   Component Value Date/Time    PROTIME 27.4 10/17/2022 06:55 AM    PROTIME 31 10/14/2022 03:42 PM    PROTIME 33.4 03/10/2014 06:51 AM    INR 2.8 10/17/2022 06:55 AM    APTT 34.9 10/17/2022 06:55 AM       HCG (If Applicable): No results found for: PREGTESTUR, PREGSERUM, HCG, HCGQUANT     ABGs: No results found for: PHART, PO2ART, PNL6XHF, UWU9BKC, BEART, A0FGHBJE     Type & Screen (If Applicable):  No results found for: LABABO, LABRH    Drug/Infectious Status (If Applicable):  No results found for: HIV, HEPCAB    COVID-19 Screening (If Applicable): No results found for: COVID19        Anesthesia Evaluation  Patient summary reviewed and Nursing notes reviewed  Airway: Mallampati: III  TM distance: >3 FB   Neck ROM: full  Mouth opening: > = 3 FB   Dental:    (+) edentulous      Pulmonary:Negative Pulmonary ROS and normal exam                               Cardiovascular:    (+) hypertension:, dysrhythmias: atrial fibrillation,         Rhythm: irregular                      Neuro/Psych:                ROS comment: -GLAUCOMA  -DEMENTIA GI/Hepatic/Renal:   (+) hiatal hernia, GERD:,           Endo/Other:    (+) hypothyroidism::., malignancy/cancer. ROS comment: -BREAST CANCER  -NPO AFTER MIDNIGHT  -ALLERGIES - LATEX, PCN, TYLENOL Abdominal:             Vascular:           ROS comment: -ANEMIA  -ANTICOAGULATED - HAS NOT STOPPED COUMADIN  -COAGULOPATHY - INR 2.8. Other Findings:           Anesthesia Plan      general     ASA 4 - emergent     (GETA)  Induction: intravenous. arterial line  MIPS: Postoperative opioids intended and Prophylactic antiemetics administered. Anesthetic plan and risks discussed with patient and legal guardian.       Plan discussed with CRNA.    Attending anesthesiologist reviewed and agrees with Rosa Bobo MD   10/17/2022

## 2022-10-17 NOTE — PROGRESS NOTES
Pt seen for re-evaluation, pt's daughter and son are at bedside. We discussed the risks/benefits of nonoperative management vs surgical exploration given CT and lab findings. All questions were answered, pt's family requested some time to consider these options. Ultimately pt's family have agreed to proceed with laparotomy. The risks include death given the urgent/emergent nature of this surgery as well as need for further surgery in the future. The benefits, alternatives, complications and procedure details were explained to the patient's son and daughter, all questions were answered. We also discussed the details surrounding hemostatic agents like TXA, I assured the family that this is a non-human product, they are agreeable to its use in case it is needed.       Electronically signed by Pio Song DO on 10/17/2022 at 2:25 PM

## 2022-10-17 NOTE — OP NOTE
Operative Note      Patient: Lupie Epley  YOB: 1932  MRN: [id number]    Date of Procedure: 10/17/2022    Pre-Op Diagnosis: Abdominal pain, unspecified abdominal location [R10.9]; Bowel perforation ([address] [K63.1]    Post-Op Diagnosis:   Need for central access  Likely focal perforation of antimesenteric bowel of mid-jejunum  Generalized peritonitis present at time of surgery with purulent ascites       Procedure(s):  LAPAROTOMY EXPLORATORY WITH SMALL BOWL RESECTION AND CENTRAL LINE PLACEMENT    Surgeon(s):  Deya Lane DO    Assistant:   * No surgical staff found *    Anesthesia: General    Estimated Blood Loss (mL): less than 11XF    Complications: None    Specimens:   ID Type Source Tests Collected by Time Destination   A : Small Bowel Tissue Tissue SURGICAL PATHOLOGY Deya Lane DO 10/17/2022 1649        Implants:  * No implants in log *      Drains:   Urinary Catheter 10/17/22 2 Way (Active)       Findings: as above. Wound class 2    Detailed Description of Procedure:       HISTORY: The patient is a 80y.o. year old female with history of above preop diagnosis. The risk, benefits, expected outcome, and alternatives to the procedure were explained to the patient's understanding and written informed consent was obtained. OPERATIVE DETAIL:  The patient was brought to the operating suite, was transferred to the operating table in supine position. Timeout was performed verifying correct patient, position, equipment and procedure to be performed. EPC cuffs were applied. Preoperative antibiotics were infused. General anesthesia was administered. Endotracheal intubation was performed. Peripheral access was poor. Central line placement was performed, consent was implied. The skin overlying the Right Internal Jugular Vein was prepped with chlorhexadine and draped in sterile fashion. Ultrasound was used to identify the RIJ.  The introducer needle was inserted into the internal jugular vein under direct US visualization returning dark red non pulsatile blood. A guidewire was placed through the center of the needle with no resistance. A small incision made in the skin with a #11 scalpel blade. The dilator was inserted into the skin and vein over guidewire using Seldinger technique. The dilator was then removed and the catheter was placed in the vein over the guidewire using Seldinger technique. The guidewire was then removed and all ports aspirated and flushed appropriately. The catheter then secured using silk suture and a temporary sterile dressing was applied. No immediate complication was evident. All sponge, instrument and needle counts were correct at the completion of the procedure. The abdomen was then prepped and draped in the usual sterile fashion. Midline laparotomy incision was made around the umbilicus, dissection was carried through the subcutaneous tissues and fascia with electrocautery, and entry to the peritoneum was made bluntly then the incision was carried superiorly and inferiorly. There was a small knuckle of omentum that was adherent to the anterior abdominal wall which was taken down sharply. The small bowel was eviscerated, the mid jejunum demonstrated the highest intensity of inflammation as well as induration of the antimesenteric border, is difficult to determine whether the palpatory findings demonstrate inflammatory induration versus soft tissue mass. Small bowel was run distally from the ligament of Treitz, no other sections of small bowel demonstrated the same abnormality as the mid jejunum. There is evidence of generalized peritonitis at the anterior abdominal wall as well as the adjacent small bowel mesentery near the area of concern. The problematic section of small bowel was transected with 5 cm margins on either side with MELVIN 55 mm blue load staplers. The mesentery was taken down to its root with LigaSure.   The bowel ends were then brought in a side-to-side functional end-to-end isoperistaltic manner and stapled anastomosis was created with MELVIN 55 mm blue load stapler, the common channel was inspected and found to be hemostatic. The common enterotomy was then closed in 2 layers with 3-0 chromic suture. The mesenteric defect was closed with interrupted sutures of 3-0 silk. The peritoneal cavity was inspected, purulent fluid was evacuated, there was no evidence of enteric content. The colon was inspected as well and found to appear normal.  The small bowel was then returned to the peritoneal cavity in a natural appearing arrangement. The peritoneal cavity was then irrigated with Irrisept solution and allowed to dwell for several minutes before being suctioned out, this was followed by several liters of sterile saline. NG tube position was checked and found to be in good position within the body of the stomach. All gloves and gowns were changed in preparation for abdominal closure. Hemostasis was excellent. The fascia was closed with opposing sutures of 0-looped PDS and tied in the center. The subcutaneous tissues were irrigated with Irrisept solution and then the skin closed with skin staples. The abdominal wall was cleansed and incision covered with bacitracin and dressings. All sponge needle and instrument counts were correct at the end of the case. The patient tolerated the procedure well without complications. She  was successfully extubated and taken to PACU for further recovery.       Electronically signed by Luis Donovan DO on 10/17/2022 at 6:03 PM

## 2022-10-17 NOTE — PROGRESS NOTES
Pharmacy Note - Renal dose adjustment made per P/T protocol    Original order:  Ciprofloxacin 400mg IV q12h    Estimated Creatinine Clearance: 28 mL/min (A) (based on SCr of 1.02 mg/dL (H)). Recent Labs     10/14/22  1542 10/17/22  0655   BUN  --  17   CREATININE  --  1.02*   PLT  --  203   INR 2.6 2.8       Renally adjusted order:  Ciprofloxacin 400mg IV q24h    Please call pharmacy with any questions.     Thank you,  Itz Summers [facility]  10/17/2022 10:55 AM

## 2022-10-17 NOTE — H&P
[facility]  Office: [address], DO, Isabela Fernando, DO, Halle Reynolds Blood, DO, Jamil Soliz MD, Ely Mobley MD, Bassam Walkre MD, Sylvie Bernheim, MD,  Razia Ybarra MD, Roslyn Rosa MD, Julia Springer, DO, Keely Parker MD,  Chau Ridley MD, Cristina Downs MD, Dg Daily DO, Yudy Churchill MD, Juanpablo Flood MD, Elyse Trejo MD, Kaiser Felix MD, Vianey Padilla MD, Isabella Amin MD, Luda Adams DO, Rik Dukes MD, Alejandra Garcia MD, Philomena Ruby, CNP,  Audrey Browning, CNP, Dennis Chandler, CNP, Zach Martinez, CNP,  Derrick Lewis, DNP, Shade Alvarado, CNP, Aimee Rodney, CNP, Dejuan Garcia, CNP, Bria Arreaga, CNP, Devin Hawkins, [facility], Tanika Vasquez, PA-C, Jacinto Jackson, [facility], Sherry Gilliam, [facility], Marisa Jenkins, CNP, Ann Garcia, CNP, Elvira Ratliff, CNP         [facility]    HISTORY AND PHYSICAL EXAMINATION            Date:   10/17/2022  Patient name: Carri Garcia  Date of admission:  10/17/2022  6:39 AM  MRN:   [id number]  Account:  [de-identified]  YOB: 1932  PCP:    Marcello Gamble MD  Room:   [facility]/508-  Code Status:    DNR-CCA    Chief Complaint:     Chief Complaint   Patient presents with    Abdominal Pain     This very pleasant 80-year-old female presents the hospital with abdominal pain, patient states \"my belly hurts\"    History Obtained From:     patient, family member -multiple family members at the bedside, electronic medical record    History of Present Illness: Carri Garcia is a 80 y.o.  /  female who presents with Abdominal Pain   and is admitted to the hospital for the management of Perforated viscus. This very pleasant 80-year-old female presents to the hospital due to worsening abdominal pain that has progressively gotten severe over the last couple of days.   She presents to the hospital where imaging studies have revealed ascites along with 2 small gas pockets consistent with a bowel perforation. The patient has met with surgery and at this point in time it is a very difficult situation. The patient is a Jehovah witness and does not want any blood products. The patient does have a history of atrial fibrillation and INR is presently 2.8. In the event that she requires surgical intervention we will utilize [employer] to honor the patient's wishes. She obviously does not want any blood products and fortunately her hemoglobin is stable. Family are at the bedside and I had a long discussion with them regarding operative versus nonoperative treatment. Given her age risk for surgery is reasonable. It is not prohibitive however. Surgery at this point in time are recommending antibiotics and serial abdominal evaluations. Family understand and appreciate the rationale and deny any questions at this point in time. Past Medical History:     Past Medical History:   Diagnosis Date    Atrial fibrillation 3/28/2014    Back pain, chronic 6/1/2015    Dementia ([address]     GERD (gastroesophageal reflux disease)     Hiatal hernia     History of breast cancer 12/1/2015    History of glaucoma     Hypertension, benign essential, goal below 140/90 6/1/2015    Hypothyroid 6/1/2015        Past Surgical History:     Past Surgical History:   Procedure Laterality Date    BLADDER REPAIR      BREAST SURGERY      left mastectomy    CATARACT REMOVAL WITH IMPLANT      CHOLECYSTECTOMY          Medications Prior to Admission:     Prior to Admission medications    Medication Sig Start Date End Date Taking?  Authorizing Provider   omeprazole (PRILOSEC) 20 MG delayed release capsule TAKE 1 CAPSULE BY MOUTH ONCE DAILY BEFORE A MEAL 9/29/22   Familia Cook MD   potassium chloride (MICRO-K) 10 MEQ extended release capsule TAKE 1 CAPSULE BY MOUTH TWICE DAILY WITH FOOD 9/29/22   Familia Cook MD   traMADol (ULTRAM) 50 MG tablet TAKE 1 TABLET BY MOUTH EVERY 8 HOURS AS NEEDED FOR PAIN FOR UP TO 30 DAYS *REDUCE DOSES TAKEN AS PAIN BECOMES MANAGEABLE* 9/26/22 10/26/22  Hieu Cesar MD   [place] MISC by Does not apply route 5 years, cannot walk over 200 ft. 9/6/22   Hieu Cesar MD   mirtazapine (REMERON) 15 MG tablet TAKE 1 TABLET BY MOUTH NIGHTLY 6/15/22   Hieu Cesar MD   warfarin (COUMADIN) 3 MG tablet 1.5 tabs Tuesday and Saturday, one tab daily rest of week or as directed  Patient taking differently: Take 3 mg by mouth See Admin Instructions Dosed by [facility] Anticoagulation Service: 1.5 mg Tuesday/Saturday and 3 mg all other days 6/15/22   Hieu Cesar MD   ascorbic acid (VITAMIN C) 500 MG tablet TAKE 1 TABLET BY MOUTH EVERY DAY 12/20/21   Hieu Cesar MD   atenolol (TENORMIN) 50 MG tablet TAKE 1 TABLET BY MOUTH EVERY DAY 11/18/21   Hieu Cesar MD   donepezil (ARICEPT) 5 MG tablet TAKE (1) TABLET BY MOUTH NIGHTLY 11/18/21   Hieu Cesar MD   amLODIPine (NORVASC) 5 MG tablet TAKE 1 TABLET BY MOUTH EVERY DAY 11/18/21   Hieu Cesar MD   doxazosin (CARDURA) 1 MG tablet TAKE 1 TABLET BY MOUTH EVERY DAY 11/18/21   Hieu Cesar MD   ferrous sulfate (FEROSUL) 325 (65 Fe) MG tablet TAKE 1 TABLET BY MOUTH DAILY WITH BREAKFAST 10/22/21   Hieu Cesar MD   levothyroxine (SYNTHROID) 100 MCG tablet TAKE 1 TABLET BY MOUTH ONCE DAILY 10/22/21   Hieu Cesar MD   Multiple Vitamins-Minerals (PRESERVISION AREDS 2) CAPS Take 1 capsule by mouth daily 7/10/20   Hieu Cesar MD   Multiple Vitamins-Minerals (ICAPS AREDS 2) CAPS Take 1 tablet by mouth 2 times daily     Historical Provider, MD   timolol (TIMOPTIC) 0.5 % ophthalmic solution instill 1 drop into both eyes twice a day 11/18/16   Historical Provider, MD   latanoprost (XALATAN) 0.005 % ophthalmic solution Apply to eye daily 10/21/16   Historical Provider, MD        Allergies:     Latex, Penicillins, and Tylenol [acetaminophen]    Social History:     Tobacco:    reports that she has never smoked. She has never used smokeless tobacco.  Alcohol:      reports no history of alcohol use. Drug Use:  reports no history of drug use. Family History:     Family History   Problem Relation Age of Onset    No Known Problems Mother     No Known Problems Father        Review of Systems:     Positive and Negative as described in HPI. CONSTITUTIONAL:  negative for fevers, chills, sweats, fatigue, weight loss  HEENT:  negative for vision, hearing changes, runny nose, throat pain  RESPIRATORY:  negative for shortness of breath, cough, congestion, wheezing  CARDIOVASCULAR:  negative for chest pain, palpitations  GASTROINTESTINAL: Patient reports nausea without vomiting. She does report severe pain predominantly in the left lower quadrant however has generalized tenderness throughout her abdominal exam.  GENITOURINARY:  negative for difficulty of urination, burning with urination, frequency   INTEGUMENT:  negative for rash, skin lesions, easy bruising   HEMATOLOGIC/LYMPHATIC:  negative for swelling/edema   ALLERGIC/IMMUNOLOGIC:  negative for urticaria , itching  ENDOCRINE:  negative increase in drinking, increase in urination, hot or cold intolerance  MUSCULOSKELETAL:  negative joint pains, muscle aches, swelling of joints  NEUROLOGICAL:  negative for headaches, dizziness, lightheadedness, numbness, pain, tingling extremities  BEHAVIOR/PSYCH:  negative for depression, anxiety    Physical Exam:   /66   Pulse 89   Temp 99.1 °F (37.3 °C) (Oral)   Resp 18   Ht 4' 11\" (1.499 m)   Wt 121 lb (54.9 kg)   SpO2 (!) 89%   BMI 24.44 kg/m²   Temp (24hrs), Av.4 °F (36.9 °C), Min:97.7 °F (36.5 °C), Max:99.1 °F (37.3 °C)    No results for input(s): POCGLU in the last 72 hours.     Intake/Output Summary (Last 24 hours) at 10/17/2022 1156  Last data filed at 10/17/2022 0942  Gross per 24 hour   Intake 1199.7 ml Output --   Net 1199.7 ml       General Appearance:  alert, well appearing, and in no acute distress  Mental status: oriented to person, place, and time  Head:  normocephalic, atraumatic  Eye: no icterus, redness, pupils equal and reactive, extraocular eye movements intact, conjunctiva clear  Ear: normal external ear, no discharge, hearing intact  Nose:  no drainage noted  Mouth: mucous membranes moist  Neck: supple, no carotid bruits, thyroid not palpable  Lungs: Bilateral equal air entry, clear to ausculation, no wheezing, rales or rhonchi, normal effort  Cardiovascular: Irregular rhythm, rate controlled  Abdomen: Soft, patient has exquisite tenderness to even gentle palpation, abdominal exam is almost peritoneal in nature, abdomen is distended, hyperactive bowel sounds  Neurologic: There are no new focal motor or sensory deficits, normal muscle tone and bulk, no abnormal sensation, normal speech, cranial nerves II through XII grossly intact  Skin: No gross lesions, rashes, bruising or bleeding on exposed skin area  Extremities:  peripheral pulses palpable, no pedal edema or calf pain with palpation  Psych: normal affect     Investigations:      Laboratory Testing:  Recent Results (from the past 24 hour(s))   CBC with Diff    Collection Time: 10/17/22  6:55 AM   Result Value Ref Range    WBC 5.5 3.5 - 11.0 k/uL    RBC 4.58 4.0 - 5.2 m/uL    Hemoglobin 14.3 12.0 - 16.0 g/dL    Hematocrit 43.8 36 - 46 %    MCV 95.5 80 - 100 fL    MCH 31.3 26 - 34 pg    MCHC 32.7 31 - 37 g/dL    RDW 14.6 12.5 - 15.4 %    Platelets 211 660 - 932 k/uL    MPV 7.3 6.0 - 12.0 fL    Seg Neutrophils 31 (L) 36 - 66 %    Lymphocytes 56 (H) 24 - 44 %    Monocytes 10 (H) 1 - 7 %    Eosinophils % 3 1 - 4 %    Basophils 0 0 - 2 %    Segs Absolute 1.71 (L) 1.8 - 7.7 k/uL    Absolute Lymph # 3.07 1.0 - 4.8 k/uL    Absolute Mono # 0.55 0.1 - 0.8 k/uL    Absolute Eos # 0.17 0.0 - 0.4 k/uL    Basophils Absolute 0.00 0.0 - 0.2 k/uL    Morphology Normal    CMP    Collection Time: 10/17/22  6:55 AM   Result Value Ref Range    Glucose 138 (H) 70 - 99 mg/dL    BUN 17 8 - 23 mg/dL    Creatinine 1.02 (H) 0.50 - 0.90 mg/dL    Est, Glom Filt Rate 52 (L) >60 mL/min/1.73m2    Calcium 9.4 8.6 - 10.4 mg/dL    Sodium 139 135 - 144 mmol/L    Potassium 4.2 3.7 - 5.3 mmol/L    Chloride 104 98 - 107 mmol/L    CO2 22 20 - 31 mmol/L    Anion Gap 13 9 - 17 mmol/L    Alkaline Phosphatase 132 (H) 35 - 104 U/L    ALT 9 5 - 33 U/L    AST 20 <32 U/L    Total Bilirubin 0.5 0.3 - 1.2 mg/dL    Total Protein 7.5 6.4 - 8.3 g/dL    Albumin 3.8 3.5 - 5.2 g/dL    Albumin/Globulin Ratio 1.0 1.0 - 2.5   Lipase    Collection Time: 10/17/22  6:55 AM   Result Value Ref Range    Lipase 22 13 - 60 U/L   Magnesium    Collection Time: 10/17/22  6:55 AM   Result Value Ref Range    Magnesium 2.0 1.6 - 2.6 mg/dL   Lactic Acid    Collection Time: 10/17/22  6:55 AM   Result Value Ref Range    Lactic Acid 3.7 (H) 0.5 - 2.2 mmol/L   APTT    Collection Time: 10/17/22  6:55 AM   Result Value Ref Range    PTT 34.9 (H) 21.3 - 31.3 sec   Protime-INR    Collection Time: 10/17/22  6:55 AM   Result Value Ref Range    Protime 27.4 (H) 9.4 - 12.6 sec    INR 2.8        Imaging/Diagnostics:  CT ABDOMEN PELVIS W IV CONTRAST Additional Contrast? None    Result Date: 10/17/2022  1. Interval development of ascites. Overall small volume. 2. In the anterior mid abdomen some loops of small bowel borderline dilated with 2 small gas pockets along the posterior aspect worrisome for extraluminal gas secondary to contained perforation. 3. No bowel obstruction. Findings were discussed with Dr Martina Arriola at 8:18 am on 10/17/2022.        Assessment :      Hospital Problems             Last Modified POA    * (Principal) Perforated viscus 10/17/2022 Yes       Plan:     Patient status inpatient in the Med/Surge    Perforated viscus  Initiate Zosyn  Pain control  IVF  Atrial fibrillation  The patient requires surgery will require Kcentra to reverse Coumadin  Cannot use FFP, patient is a Jehovah witness  Essential hypertension  IV medications  As needed Lopressor  Hypothyroidism  If patient remains n.p.o. for prolonged period of time we will transition to IV replacement    Consultations:   None    Patient is admitted as inpatient status because of co-morbidities listed above, severity of signs and symptoms as outlined, requirement for current medical therapies and most importantly because of direct risk to patient if care not provided in a hospital setting. Expected length of stay > 48 hours.     Christopher Bah DO  10/17/2022  11:56 AM    Copy sent to Dr. Jason Valiente MD

## 2022-10-18 PROBLEM — K63.1 BOWEL PERFORATION (HCC): Status: ACTIVE | Noted: 2022-10-18

## 2022-10-18 LAB
ANION GAP SERPL CALCULATED.3IONS-SCNC: 13 MMOL/L (ref 9–17)
BUN BLDV-MCNC: 15 MG/DL (ref 8–23)
CALCIUM SERPL-MCNC: 8.3 MG/DL (ref 8.6–10.4)
CHLORIDE BLD-SCNC: 105 MMOL/L (ref 98–107)
CO2: 17 MMOL/L (ref 20–31)
CREAT SERPL-MCNC: 0.89 MG/DL (ref 0.5–0.9)
EKG ATRIAL RATE: 73 BPM
EKG P AXIS: 83 DEGREES
EKG P-R INTERVAL: 176 MS
EKG Q-T INTERVAL: 392 MS
EKG QRS DURATION: 74 MS
EKG QTC CALCULATION (BAZETT): 431 MS
EKG R AXIS: -18 DEGREES
EKG T AXIS: 65 DEGREES
EKG VENTRICULAR RATE: 73 BPM
FIO2: 30
GFR SERPL CREATININE-BSD FRML MDRD: >60 ML/MIN/1.73M2
GLUCOSE BLD-MCNC: 105 MG/DL (ref 70–99)
HCO3 VENOUS: 17.2 MMOL/L (ref 22–29)
LACTIC ACID: 4.4 MMOL/L (ref 0.5–2.2)
NEGATIVE BASE EXCESS, VEN: 6 (ref 0–2)
O2 DEVICE/FLOW/%: ABNORMAL
O2 SAT, VEN: 97 % (ref 60–85)
PCO2, VEN: 27.1 MM HG (ref 41–51)
PH VENOUS: 7.41 (ref 7.32–7.43)
PO2, VEN: 90.9 MM HG (ref 30–50)
POTASSIUM SERPL-SCNC: 4.4 MMOL/L (ref 3.7–5.3)
REASON FOR REJECTION: NORMAL
SAMPLE SITE: ABNORMAL
SODIUM BLD-SCNC: 135 MMOL/L (ref 135–144)
ZZ NTE CLEAN UP: ORDERED TEST: NORMAL
ZZ NTE WITH NAME CLEAN UP: SPECIMEN SOURCE: NORMAL

## 2022-10-18 PROCEDURE — 6360000002 HC RX W HCPCS: Performed by: STUDENT IN AN ORGANIZED HEALTH CARE EDUCATION/TRAINING PROGRAM

## 2022-10-18 PROCEDURE — 97530 THERAPEUTIC ACTIVITIES: CPT

## 2022-10-18 PROCEDURE — 2700000000 HC OXYGEN THERAPY PER DAY

## 2022-10-18 PROCEDURE — 6360000002 HC RX W HCPCS

## 2022-10-18 PROCEDURE — 99024 POSTOP FOLLOW-UP VISIT: CPT | Performed by: SURGERY

## 2022-10-18 PROCEDURE — 94003 VENT MGMT INPAT SUBQ DAY: CPT

## 2022-10-18 PROCEDURE — 2500000003 HC RX 250 WO HCPCS: Performed by: INTERNAL MEDICINE

## 2022-10-18 PROCEDURE — 94761 N-INVAS EAR/PLS OXIMETRY MLT: CPT

## 2022-10-18 PROCEDURE — 6370000000 HC RX 637 (ALT 250 FOR IP): Performed by: INTERNAL MEDICINE

## 2022-10-18 PROCEDURE — 36415 COLL VENOUS BLD VENIPUNCTURE: CPT

## 2022-10-18 PROCEDURE — 2500000003 HC RX 250 WO HCPCS: Performed by: HOSPITALIST

## 2022-10-18 PROCEDURE — 2500000003 HC RX 250 WO HCPCS: Performed by: SURGERY

## 2022-10-18 PROCEDURE — 80048 BASIC METABOLIC PNL TOTAL CA: CPT

## 2022-10-18 PROCEDURE — 6360000002 HC RX W HCPCS: Performed by: HOSPITALIST

## 2022-10-18 PROCEDURE — 82803 BLOOD GASES ANY COMBINATION: CPT

## 2022-10-18 PROCEDURE — 97535 SELF CARE MNGMENT TRAINING: CPT

## 2022-10-18 PROCEDURE — 97162 PT EVAL MOD COMPLEX 30 MIN: CPT

## 2022-10-18 PROCEDURE — 2000000000 HC ICU R&B

## 2022-10-18 PROCEDURE — 6360000002 HC RX W HCPCS: Performed by: INTERNAL MEDICINE

## 2022-10-18 PROCEDURE — 97166 OT EVAL MOD COMPLEX 45 MIN: CPT

## 2022-10-18 PROCEDURE — 99232 SBSQ HOSP IP/OBS MODERATE 35: CPT | Performed by: STUDENT IN AN ORGANIZED HEALTH CARE EDUCATION/TRAINING PROGRAM

## 2022-10-18 PROCEDURE — 83605 ASSAY OF LACTIC ACID: CPT

## 2022-10-18 PROCEDURE — 94760 N-INVAS EAR/PLS OXIMETRY 1: CPT

## 2022-10-18 PROCEDURE — 6360000002 HC RX W HCPCS: Performed by: SURGERY

## 2022-10-18 PROCEDURE — 2580000003 HC RX 258: Performed by: HOSPITALIST

## 2022-10-18 PROCEDURE — 2580000003 HC RX 258: Performed by: INTERNAL MEDICINE

## 2022-10-18 PROCEDURE — 99291 CRITICAL CARE FIRST HOUR: CPT | Performed by: INTERNAL MEDICINE

## 2022-10-18 RX ORDER — CLINDAMYCIN PHOSPHATE 900 MG/50ML
900 INJECTION INTRAVENOUS
Status: DISCONTINUED | OUTPATIENT
Start: 2022-10-18 | End: 2022-10-18

## 2022-10-18 RX ORDER — PHYTONADIONE 5 MG/1
5 TABLET ORAL ONCE
Status: DISCONTINUED | OUTPATIENT
Start: 2022-10-18 | End: 2022-10-21

## 2022-10-18 RX ORDER — DIPHENHYDRAMINE HYDROCHLORIDE 50 MG/ML
12.5 INJECTION INTRAMUSCULAR; INTRAVENOUS
Status: DISCONTINUED | OUTPATIENT
Start: 2022-10-18 | End: 2022-10-18

## 2022-10-18 RX ORDER — SODIUM CHLORIDE 0.9 % (FLUSH) 0.9 %
5-40 SYRINGE (ML) INJECTION PRN
Status: DISCONTINUED | OUTPATIENT
Start: 2022-10-18 | End: 2022-10-18

## 2022-10-18 RX ORDER — LABETALOL HYDROCHLORIDE 5 MG/ML
10 INJECTION, SOLUTION INTRAVENOUS
Status: DISCONTINUED | OUTPATIENT
Start: 2022-10-18 | End: 2022-10-18

## 2022-10-18 RX ORDER — MIDAZOLAM HYDROCHLORIDE 2 MG/2ML
2 INJECTION, SOLUTION INTRAMUSCULAR; INTRAVENOUS
Status: ACTIVE | OUTPATIENT
Start: 2022-10-18 | End: 2022-10-19

## 2022-10-18 RX ORDER — TRANEXAMIC ACID 100 MG/ML
1000 INJECTION, SOLUTION INTRAVENOUS ONCE
Status: DISCONTINUED | OUTPATIENT
Start: 2022-10-18 | End: 2022-10-22

## 2022-10-18 RX ORDER — PROPOFOL 10 MG/ML
INJECTION, EMULSION INTRAVENOUS
Status: COMPLETED
Start: 2022-10-18 | End: 2022-10-18

## 2022-10-18 RX ORDER — SODIUM CHLORIDE 0.9 % (FLUSH) 0.9 %
5-40 SYRINGE (ML) INJECTION EVERY 12 HOURS SCHEDULED
Status: DISCONTINUED | OUTPATIENT
Start: 2022-10-18 | End: 2022-10-24 | Stop reason: HOSPADM

## 2022-10-18 RX ORDER — SODIUM CHLORIDE 9 MG/ML
25 INJECTION, SOLUTION INTRAVENOUS PRN
Status: DISCONTINUED | OUTPATIENT
Start: 2022-10-18 | End: 2022-10-18

## 2022-10-18 RX ORDER — GLYCOPYRROLATE 0.2 MG/ML
0.4 INJECTION INTRAMUSCULAR; INTRAVENOUS ONCE
Status: DISCONTINUED | OUTPATIENT
Start: 2022-10-18 | End: 2022-10-18

## 2022-10-18 RX ORDER — PROMETHAZINE HYDROCHLORIDE 25 MG/ML
6.25 INJECTION, SOLUTION INTRAMUSCULAR; INTRAVENOUS EVERY 5 MIN PRN
Status: DISCONTINUED | OUTPATIENT
Start: 2022-10-18 | End: 2022-10-18

## 2022-10-18 RX ORDER — IPRATROPIUM BROMIDE AND ALBUTEROL SULFATE 2.5; .5 MG/3ML; MG/3ML
1 SOLUTION RESPIRATORY (INHALATION)
Status: DISCONTINUED | OUTPATIENT
Start: 2022-10-18 | End: 2022-10-18

## 2022-10-18 RX ORDER — ENOXAPARIN SODIUM 100 MG/ML
1 INJECTION SUBCUTANEOUS 2 TIMES DAILY
Status: DISCONTINUED | OUTPATIENT
Start: 2022-10-18 | End: 2022-10-24 | Stop reason: HOSPADM

## 2022-10-18 RX ORDER — ONDANSETRON 2 MG/ML
4 INJECTION INTRAMUSCULAR; INTRAVENOUS
Status: DISCONTINUED | OUTPATIENT
Start: 2022-10-18 | End: 2022-10-18

## 2022-10-18 RX ORDER — MORPHINE SULFATE 2 MG/ML
2 INJECTION, SOLUTION INTRAMUSCULAR; INTRAVENOUS EVERY 5 MIN PRN
Status: DISCONTINUED | OUTPATIENT
Start: 2022-10-18 | End: 2022-10-18

## 2022-10-18 RX ADMIN — METRONIDAZOLE 500 MG: 500 INJECTION, SOLUTION INTRAVENOUS at 16:28

## 2022-10-18 RX ADMIN — FENTANYL CITRATE 50 MCG: 50 INJECTION, SOLUTION INTRAMUSCULAR; INTRAVENOUS at 00:25

## 2022-10-18 RX ADMIN — ENOXAPARIN SODIUM 60 MG: 100 INJECTION SUBCUTANEOUS at 08:21

## 2022-10-18 RX ADMIN — METRONIDAZOLE 500 MG: 500 INJECTION, SOLUTION INTRAVENOUS at 08:22

## 2022-10-18 RX ADMIN — SODIUM CHLORIDE, PRESERVATIVE FREE 10 ML: 5 INJECTION INTRAVENOUS at 08:27

## 2022-10-18 RX ADMIN — METRONIDAZOLE 500 MG: 500 INJECTION, SOLUTION INTRAVENOUS at 02:44

## 2022-10-18 RX ADMIN — FENTANYL CITRATE 50 MCG: 50 INJECTION, SOLUTION INTRAMUSCULAR; INTRAVENOUS at 08:19

## 2022-10-18 RX ADMIN — ONDANSETRON 4 MG: 2 INJECTION INTRAMUSCULAR; INTRAVENOUS at 08:21

## 2022-10-18 RX ADMIN — CIPROFLOXACIN 400 MG: 2 INJECTION, SOLUTION INTRAVENOUS at 10:03

## 2022-10-18 RX ADMIN — FENTANYL CITRATE 50 MCG: 50 INJECTION, SOLUTION INTRAMUSCULAR; INTRAVENOUS at 10:52

## 2022-10-18 RX ADMIN — CHLORHEXIDINE GLUCONATE 0.12% ORAL RINSE 15 ML: 1.2 LIQUID ORAL at 08:19

## 2022-10-18 RX ADMIN — PROPOFOL 30 MCG/KG/MIN: 10 INJECTION, EMULSION INTRAVENOUS at 04:24

## 2022-10-18 RX ADMIN — ENOXAPARIN SODIUM 60 MG: 100 INJECTION SUBCUTANEOUS at 21:00

## 2022-10-18 RX ADMIN — MORPHINE SULFATE 2 MG: 2 INJECTION, SOLUTION INTRAMUSCULAR; INTRAVENOUS at 14:19

## 2022-10-18 RX ADMIN — ONDANSETRON 4 MG: 2 INJECTION INTRAMUSCULAR; INTRAVENOUS at 14:19

## 2022-10-18 RX ADMIN — MORPHINE SULFATE 2 MG: 2 INJECTION, SOLUTION INTRAMUSCULAR; INTRAVENOUS at 09:31

## 2022-10-18 RX ADMIN — SODIUM CHLORIDE, POTASSIUM CHLORIDE, SODIUM LACTATE AND CALCIUM CHLORIDE: 600; 310; 30; 20 INJECTION, SOLUTION INTRAVENOUS at 06:05

## 2022-10-18 RX ADMIN — SODIUM CHLORIDE, PRESERVATIVE FREE 20 MG: 5 INJECTION INTRAVENOUS at 08:20

## 2022-10-18 RX ADMIN — FENTANYL CITRATE 50 MCG: 50 INJECTION, SOLUTION INTRAMUSCULAR; INTRAVENOUS at 04:25

## 2022-10-18 ASSESSMENT — PAIN - FUNCTIONAL ASSESSMENT: PAIN_FUNCTIONAL_ASSESSMENT: PREVENTS OR INTERFERES SOME ACTIVE ACTIVITIES AND ADLS

## 2022-10-18 ASSESSMENT — PAIN DESCRIPTION - PAIN TYPE: TYPE: ACUTE PAIN;SURGICAL PAIN

## 2022-10-18 ASSESSMENT — PULMONARY FUNCTION TESTS
PIF_VALUE: 17
PIF_VALUE: 17
PIF_VALUE: 18
PIF_VALUE: 17
PIF_VALUE: 18
PIF_VALUE: 16
PIF_VALUE: 17
PIF_VALUE: 18
PIF_VALUE: 17
PIF_VALUE: 19
PIF_VALUE: 18

## 2022-10-18 ASSESSMENT — PAIN SCALES - GENERAL
PAINLEVEL_OUTOF10: 6
PAINLEVEL_OUTOF10: 3
PAINLEVEL_OUTOF10: 6
PAINLEVEL_OUTOF10: 0
PAINLEVEL_OUTOF10: 6
PAINLEVEL_OUTOF10: 7
PAINLEVEL_OUTOF10: 6
PAINLEVEL_OUTOF10: 0
PAINLEVEL_OUTOF10: 0

## 2022-10-18 ASSESSMENT — PAIN DESCRIPTION - ONSET: ONSET: ON-GOING

## 2022-10-18 ASSESSMENT — ENCOUNTER SYMPTOMS
VOMITING: 0
VOICE CHANGE: 0
ABDOMINAL PAIN: 1
DIARRHEA: 0

## 2022-10-18 ASSESSMENT — PAIN DESCRIPTION - ORIENTATION: ORIENTATION: MID

## 2022-10-18 ASSESSMENT — PAIN DESCRIPTION - LOCATION: LOCATION: ABDOMEN

## 2022-10-18 ASSESSMENT — PAIN DESCRIPTION - DESCRIPTORS: DESCRIPTORS: ACHING

## 2022-10-18 ASSESSMENT — PAIN DESCRIPTION - FREQUENCY: FREQUENCY: CONTINUOUS

## 2022-10-18 NOTE — CONSULTS
Critical Care Admit/Consult Note   [facility]      Patient - Jess Marques   MRN -  [id number]   Acct # - [de-identified]   - 1932      Date of Admission -  10/17/2022  6:39 AM  Date of evaluation -  10/17/2022  [id number]   Hospital Day - 0            ADMIT/CONSULT DETAILS     Reason for Admit/Consult     Chief Complaint   Patient presents with    Abdominal Pain         Consulting [address], DO  Primary Care Physician - Yesika Ramirez MD         HPI   The patient is a 80 y.o. female admitted for abdominal pain for 2-3 days with progressive worsening and found to have perforated viscous and is post ex lap . Sedated . She is [place] witness .    Has Afib            Past Medical History         Diagnosis Date    Atrial fibrillation 3/28/2014    Back pain, chronic 2015    Dementia ([address]     GERD (gastroesophageal reflux disease)     Hiatal hernia     History of breast cancer 2015    History of glaucoma     Hypertension, benign essential, goal below 140/90 2015    Hypothyroid 2015        Past Surgical History           Procedure Laterality Date    BLADDER REPAIR      BREAST SURGERY      left mastectomy    CATARACT REMOVAL WITH IMPLANT      CHOLECYSTECTOMY         SOCIAL AND OCCUPATIONAL HEALTH:  The patient is a   Social History     Socioeconomic History    Marital status:      Spouse name: None    Number of children: None    Years of education: None    Highest education level: None   Tobacco Use    Smoking status: Never    Smokeless tobacco: Never   Vaping Use    Vaping Use: Never used   Substance and Sexual Activity    Alcohol use: No     Alcohol/week: 0.0 standard drinks    Drug use: No     Social Determinants of Health     Physical Activity: Inactive    Days of Exercise per Week: 0 days    Minutes of Exercise per Session: 0 min                  Current Medications   Current Medications    sodium chloride flush  3 mL IntraVENous Q8H    sodium chloride  80 mL IntraVENous Once    sodium chloride flush  5-40 mL IntraVENous 2 times per day    metroNIDAZOLE  500 mg IntraVENous Q8H    [START ON 10/18/2022] ciprofloxacin  400 mg IntraVENous Q24H    tranexamic acid        clindamycin        bacitracin        chlorhexidine  15 mL Mouth/Throat BID    [START ON 10/18/2022] famotidine (PEPCID) injection  20 mg IntraVENous Daily     sodium chloride flush, sodium chloride flush, sodium chloride, potassium chloride **OR** potassium alternative oral replacement **OR** potassium chloride, magnesium sulfate, ondansetron **OR** ondansetron, polyethylene glycol, morphine **OR** morphine, fentanNYL  IV Drips/Infusions   sodium chloride 10 mL/hr at 10/17/22 2324    lactated ringers 100 mL/hr at 10/17/22 2324    propofol 35 mcg/kg/min (10/17/22 2325)     Home Medications  Medications Prior to Admission: omeprazole (PRILOSEC) 20 MG delayed release capsule, TAKE 1 CAPSULE BY MOUTH ONCE DAILY BEFORE A MEAL  potassium chloride (MICRO-K) 10 MEQ extended release capsule, TAKE 1 CAPSULE BY MOUTH TWICE DAILY WITH FOOD  traMADol (ULTRAM) 50 MG tablet, TAKE 1 TABLET BY MOUTH EVERY 8 HOURS AS NEEDED FOR PAIN FOR UP TO 30 DAYS *REDUCE DOSES TAKEN AS PAIN BECOMES MANAGEABLE*  Handicap [place] MISC, by Does not apply route 5 years, cannot walk over 200 ft.  mirtazapine (REMERON) 15 MG tablet, TAKE 1 TABLET BY MOUTH NIGHTLY  warfarin (COUMADIN) 3 MG tablet, 1.5 tabs Tuesday and Saturday, one tab daily rest of week or as directed (Patient taking differently: Take 3 mg by mouth See Admin Instructions Dosed by [address]  Anticoagulation Service: 1.5 mg Tuesday/Saturday and 3 mg all other days)  ascorbic acid (VITAMIN C) 500 MG tablet, TAKE 1 TABLET BY MOUTH EVERY DAY  atenolol (TENORMIN) 50 MG tablet, TAKE 1 TABLET BY MOUTH EVERY DAY  donepezil (ARICEPT) 5 MG tablet, TAKE (1) TABLET BY MOUTH NIGHTLY  amLODIPine (NORVASC) 5 MG tablet, TAKE 1 TABLET BY MOUTH EVERY DAY  doxazosin (CARDURA) 1 MG tablet, TAKE 1 TABLET BY MOUTH EVERY DAY  ferrous sulfate (FEROSUL) 325 (65 Fe) MG tablet, TAKE 1 TABLET BY MOUTH DAILY WITH BREAKFAST  levothyroxine (SYNTHROID) 100 MCG tablet, TAKE 1 TABLET BY MOUTH ONCE DAILY  Multiple Vitamins-Minerals (PRESERVISION AREDS 2) CAPS, Take 1 capsule by mouth daily  Multiple Vitamins-Minerals (ICAPS AREDS 2) CAPS, Take 1 tablet by mouth 2 times daily   timolol (TIMOPTIC) 0.5 % ophthalmic solution, instill 1 drop into both eyes twice a day  latanoprost (XALATAN) 0.005 % ophthalmic solution, Apply to eye daily    Diet/Nutrition   Diet NPO    Allergies   Latex, Penicillins, and Tylenol [acetaminophen]      Family History         Problem Relation Age of Onset    No Known Problems Mother     No Known Problems Father            ROS     REVIEW OF SYSTEMS:  Ros unable to perform due to intubation and sedation      Mechanical Ventilation Data   VENT SETTINGS (Comprehensive)  Vent Information  Ventilator Initiate: Yes  Vent Mode: AC/PRVC  Additional Respiratory Assessments  Heart Rate: 74  Resp: 20  SpO2: 98 %  End Tidal CO2: 27 (%)    ABG    Lab Results   Component Value Date/Time    FIO2 100.0 10/17/2022 06:52 PM         Vitals    height is 4' 11\" (1.499 m) and weight is 122 lb 2.2 oz (55.4 kg). Her oral temperature is 98.1 °F (36.7 °C). Her blood pressure is 196/91 (abnormal) and her pulse is 74. Her respiration is 20 and oxygen saturation is 98%.        Temperature Range: Temp: 98.1 °F (36.7 °C) Temp  Av.7 °F (37.1 °C)  Min: 97.7 °F (36.5 °C)  Max: 100 °F (37.8 °C)  BP Range:  Systolic (46HRB), KBU:298 , Min:99 , DMR:943     Diastolic (96VYQ), CRP:82, Min:57, Max:91    Pulse Range: Pulse  Av  Min: 74  Max: 102  Respiration Range: Resp  Av.9  Min: 16  Max: 24  Current Pulse Ox[de-identified]  SpO2: 98 %  24HR Pulse Ox Range:  SpO2  Av.1 %  Min: 88 %  Max: 100 %  Oxygen Amount and Delivery: O2 Flow Rate (L/min): 2 L/min      I/O (24 Hours)    Patient Vitals for the past 8 hrs:   BP Temp Temp src Pulse Resp SpO2 Weight   10/17/22 2300 -- -- -- 74 20 98 % --   10/17/22 2207 -- -- -- -- 20 -- --   10/17/22 2200 -- -- -- 77 20 98 % --   10/17/22 2100 -- -- -- 88 18 96 % --   10/17/22 2045 -- -- -- 93 17 97 % --   10/17/22 2030 -- -- -- 96 21 97 % --   10/17/22 2006 -- -- -- 96 16 96 % --   10/17/22 1915 -- -- -- -- -- -- 122 lb 2.2 oz (55.4 kg)   10/17/22 1852 -- 98.1 °F (36.7 °C) Oral -- 16 100 % --   10/17/22 1805 (!) 196/91 -- -- 98 16 100 % --       Intake/Output Summary (Last 24 hours) at 10/17/2022 2346  Last data filed at 10/17/2022 2340  Gross per 24 hour   Intake 3897.42 ml   Output 620 ml   Net 3277.42 ml     I/O last 3 completed shifts: In: 3199.7 [I.V.:2000; IV Piggyback:1199.7]  Out: 400 [Urine:300; Blood:100]   Date 10/17/22 0000 - 10/17/22 2359   Shift 1452-9044 6675-9201 1924-7907 24 Hour Total   INTAKE   P.O.(mL/kg/hr)   0 0   I. V.(mL/kg)   2497. 9(45.1) 2497. 9(45.1)   IV Piggyback(mL/kg) 500(9.1) 699. 7(12.7) 199.8(3.6) 1399. 5(25.3)   Shift Total(mL/kg) 500(9.1) 699. 7(12.7) 2697. 7(48.7) [id number]. 4(70.4)   OUTPUT   Urine(mL/kg/hr)   520 520   Blood(mL/kg)   100(1.8) 100(1.8)   Shift Total(mL/kg)   507(89.7) 620(11.2)   Weight (kg) 54.9 54.9 55.4 55.4     Patient Vitals for the past 96 hrs (Last 3 readings):   Weight   10/17/22 1915 122 lb 2.2 oz (55.4 kg)   10/17/22 0658 121 lb (54.9 kg)         Exam         PHYSICAL EXAM:  Sedated   Lung clear ant   Cvs r1 s2   Abd incsion bandaged   Bs dec   Le no edema   Ue no edema       Data   Old records and images have been reviewed    Lab Results   CBC     Lab Results   Component Value Date/Time    WBC 4.7 10/17/2022 06:55 PM    RBC 4.08 10/17/2022 06:55 PM    RBC 4.49 01/05/2012 07:37 AM    HGB 12.9 10/17/2022 06:55 PM    HCT 38.8 10/17/2022 06:55 PM     10/17/2022 06:55 PM     01/05/2012 07:37 AM    MCV 95.1 10/17/2022 06:55 PM    MCH 31.6 10/17/2022 06:55 PM    MCHC 33.2 10/17/2022 06:55 PM RDW 14.2 10/17/2022 06:55 PM    LYMPHOPCT 56 10/17/2022 06:55 AM    MONOPCT 10 10/17/2022 06:55 AM    BASOPCT 0 10/17/2022 06:55 AM    MONOSABS 0.55 10/17/2022 06:55 AM    LYMPHSABS 3.07 10/17/2022 06:55 AM    EOSABS 0.17 10/17/2022 06:55 AM    BASOSABS 0.00 10/17/2022 06:55 AM    DIFFTYPE NOT REPORTED 01/06/2020 03:40 AM       BMP   Lab Results   Component Value Date/Time     10/17/2022 06:55 PM    K 4.5 10/17/2022 06:55 PM     10/17/2022 06:55 PM    CO2 16 10/17/2022 06:55 PM    BUN 14 10/17/2022 06:55 PM    CREATININE 0.97 10/17/2022 06:55 PM    GLUCOSE 109 10/17/2022 06:55 PM    GLUCOSE 102 01/05/2012 07:37 AM    CALCIUM 8.2 10/17/2022 06:55 PM       LFTS  Lab Results   Component Value Date/Time    ALKPHOS 132 10/17/2022 06:55 AM    ALT 9 10/17/2022 06:55 AM    AST 20 10/17/2022 06:55 AM    PROT 7.5 10/17/2022 06:55 AM    BILITOT 0.5 10/17/2022 06:55 AM    LABALBU 3.8 10/17/2022 06:55 AM    LABALBU 4.1 01/05/2012 07:37 AM       INR  Recent Labs     10/17/22  0655   PROTIME 27.4*   INR 2.8       APTT  Recent Labs     10/17/22  0655   APTT 34.9*       Lactic Acid  Lab Results   Component Value Date/Time    LACTA 5.2 10/17/2022 06:55 PM    LACTA 3.7 10/17/2022 06:55 AM    LACTA 1.9 01/06/2020 06:10 AM        BNP   No results for input(s): BNP in the last 72 hours. CT ABDOMEN PELVIS W IV CONTRAST Additional Contrast? None    Result Date: 10/17/2022  1. Interval development of ascites. Overall small volume. 2. In the anterior mid abdomen some loops of small bowel borderline dilated with 2 small gas pockets along the posterior aspect worrisome for extraluminal gas secondary to contained perforation. 3. No bowel obstruction. Findings were discussed with Dr Marianna Kraft at 8:18 am on 10/17/2022. XR CHEST PORTABLE    Result Date: 10/17/2022  1. Endotracheal tube in expected position. 2. Central venous catheter terminates in the right atrium.  3. Enteric tube is below the diaphragm, extending beyond the field of view. IMPRESSION  AND PLAN   IMPRESSION   Principal Problem:    Perforated viscus  Active Problems:    Abdominal pain  Resolved Problems:    * No resolved hospital problems. *   LAPAROTOMY EXPLORATORY WITH SMALL BOWL RESECTION   Metabolic acidosis ag   Lactic acidosis     Plan  Cont cipr and flagyl   Increase rr to keep mv 7-8 l/min   Iv fluids LR at 100 ml /hr   Lactic acid in am   Monitor UO   Propofol for sedation   SWT and SBT in am         \"Thank you for asking us to see this complex patient. \"    Total critical care time caring for this patient with life threatening, unstable organ failure, including direct patient contact, management of life support systems, review of data including imaging and labs, discussions with other team members and physicians at least 28   Min so far today, excluding procedures.       Electronically signed by Summer Kingston MD on 10/17/2022 at 11:46 PM

## 2022-10-18 NOTE — PLAN OF CARE
Problem: Respiratory - Adult  Goal: Achieves optimal ventilation and oxygenation  Flowsheets (Taken 10/17/2022 2147)  Achieves optimal ventilation and oxygenation:   Assess for changes in respiratory status   Assess for changes in mentation and behavior   Position to facilitate oxygenation and minimize respiratory effort   Oxygen supplementation based on oxygen saturation or arterial blood gases   Encourage broncho-pulmonary hygiene including cough, deep breathe, incentive spirometry   Assess the need for suctioning and aspirate as needed   Assess and instruct to report shortness of breath or any respiratory difficulty   Respiratory therapy support as indicated

## 2022-10-18 NOTE — PROGRESS NOTES
spoke with Dr. Lily Marmolejo with pulmonary regarding extubation. Ok to extubate once propofol is weaned off if patient tolerates wean trial. Dr. Le Jurado updated. Will start weaning process.

## 2022-10-18 NOTE — PROGRESS NOTES
[facility] General Surgery Progress Note            PATIENT NAME: Erendira Padilla     TODAY'S DATE: 10/18/2022, 7:11 AM    CC: perforated small bowel    SUBJECTIVE:    Pt seen and examined. No acute events overnight, pt remains on ventilator. Sinus rhythm on monitor, arterial line shows mild hypertension. OBJECTIVE:   Vitals:  BP (!) 196/91   Pulse 73   Temp 98.6 °F (37 °C) (Axillary)   Resp 20   Ht 4' 11\" (1.499 m)   Wt 122 lb 2.2 oz (55.4 kg)   SpO2 98%   BMI 24.67 kg/m²    TEMPERATURE:  Current - Temp: 98.6 °F (37 °C); Max - Temp  Av °F (37.2 °C)  Min: 98.1 °F (36.7 °C)  Max: 100 °F (37.8 °C)    INTAKE/OUTPUT:      Intake/Output Summary (Last 24 hours) at 10/18/2022 0711  Last data filed at 10/18/2022 0610  Gross per 24 hour   Intake 4786.88 ml   Output 930 ml   Net 3856.88 ml                 General: AOx3, NAD  Lungs: Symmetrical chest rise bilaterally, no audible wheezes or rhonchi  Heart: S1S2  Abdomen: Soft, ND, Nontender. Extremity: moves all extremities x4, No edema      Data Review:  CBC:   Recent Labs     10/17/22  0655 10/17/22  1855   WBC 5.5 4.7   HGB 14.3 12.9    148     BMP:    Recent Labs     10/17/22  0655 10/17/22  1855 10/18/22  0503    137 135   K 4.2 4.5 4.4    107 105   CO2 22 16* 17*   BUN 17 14 15   CREATININE 1.02* 0.97* 0.89   GLUCOSE 138* 109* 105*     Hepatic:   Recent Labs     10/17/22  0655   AST 20   ALT 9   ALKPHOS 132*   BILITOT 0.5     Coagulation:   Recent Labs     10/17/22  0655   APTT 34.9*   PROT 7.5   INR 2.8             ASSESSMENT     Patient Active Problem List   Diagnosis    Hypertension, benign essential, goal below 140/90    Back pain, chronic    Hearing loss    Obesity (BMI 30.0-34. 9)    Hypothyroidism    Chronic atrial fibrillation (HCC)    History of breast cancer    Dementia with behavioral disturbance    Abdominal pain    BMI 28.0-28.9,adult    Hallucination, visual    Closed displaced fracture of middle phalanx of right middle finger    Perforated viscus   Exploratory laparotomy with small bowel resection and abdominal washout POD 1    PLAN    Plan dressing change 10/19, ok to change if needed before then  Vent management per Pulm/CC  Continue abx for generalized peritonitis present at time of surgery, plan 4d total treatment  Supportive care  Electronically signed by Carin Chadwick DO on 10/18/2022 at 7:18 AM

## 2022-10-18 NOTE — PROGRESS NOTES
PULMONARY & CRITICAL CARE MEDICINE PROGRESS NOTE     Patient: Chip Ansari  MRN: [id number]  Admit date: 10/17/2022  Primary Care Physician: Mao Quintanilla MD  Consulting Physician: Gali Frederick MD  CODE Status: DNR-CCA  LOS: 1     SUBJECTIVE     CHIEF COMPLAINT/REASON FOR INITIAL CONSULT: Vent management    BRIEF HOSPITAL COURSE:   The patient is a 80 y.o. female presenting with abdominal pain. Her CT abdomen was concerning for contained perforation. She is s/p exploratory laparotomy and small bowel resection. She was noted to have fecal peritonitis. She was left intubated after the procedure and transferred to ICU    INTERVAL HISTORY:  10/18/22  Patient was following commands and tolerated spontaneous breathing trial  She was extubated without any immediate complications  Remains hemodynamically stable, not on any pressor support  White count is 4.7  Patient is currently on Cipro and Flagyl    REVIEW OF SYSTEMS:  Review of Systems   Constitutional:  Positive for fatigue. Negative for fever. HENT:  Negative for voice change. Eyes:  Negative for visual disturbance. Gastrointestinal:  Positive for abdominal pain. Negative for diarrhea and vomiting. Genitourinary:  Negative for dysuria and urgency. Musculoskeletal:  Negative for joint swelling. Allergic/Immunologic: Negative for environmental allergies and immunocompromised state. Neurological:  Positive for weakness. Hematological:  Negative for adenopathy. Does not bruise/bleed easily. Psychiatric/Behavioral:  Negative for behavioral problems.       OBJECTIVE     VENTILATOR SETTINGS:  Vent Information  Ventilator Initiate: Yes  Vent Mode: AC/PRVC     PaO2/FiO2 RATIO:  Recent Labs     10/17/22  1852   POCPO2 354.1*      FiO2 : 30 %     VITAL SIGNS:   LAST:  BP (!) 114/51   Pulse 84   Temp 98.4 °F (36.9 °C)   Resp 23   Ht 4' 11\" (1.499 m)   Wt 122 lb 2.2 oz (55.4 kg)   SpO2 97%   BMI 24.67 kg/m²   8-24 HR RANGE:  TEMP Temp  Av.4 °F (36.9 °C)  Min: 98.1 °F (36.7 °C)  Max: 98.6 °F (37 °C)   BP Systolic (01FTF), CKN:255 , Min:109 , VC      Diastolic (72LGV), IVL:06, Min:44, Max:91     PULSE Pulse  Av.6  Min: 70  Max: 98   RR Resp  Av  Min: 11  Max: 23   O2 SAT No data recorded   OXYGEN DELIVERY No data recorded        Physical Exam  Constitutional:       General: She is awake. She is not in acute distress. Appearance: She is ill-appearing. Interventions: Nasal cannula in place. HENT:      Head: Normocephalic and atraumatic. Eyes:      General: No scleral icterus. Conjunctiva/sclera: Conjunctivae normal.      Pupils: Pupils are equal, round, and reactive to light. Neck:      Thyroid: No thyromegaly. Vascular: No JVD. Cardiovascular:      Rate and Rhythm: Normal rate and regular rhythm. Pulses: Normal pulses. Heart sounds: Normal heart sounds, S1 normal and S2 normal. No murmur heard. Pulmonary:      Breath sounds: Decreased breath sounds present. Abdominal:      General: Bowel sounds are absent. Palpations: Abdomen is soft. There is no mass. Tenderness: There is abdominal tenderness. Musculoskeletal:      Cervical back: Neck supple. Right lower leg: No edema. Left lower leg: No edema. Lymphadenopathy:      Cervical: No cervical adenopathy. Skin:     Coloration: Skin is not jaundiced or pale. Findings: No rash. Nails: There is no clubbing. Neurological:      General: No focal deficit present. Mental Status: She is alert.        DATA REVIEW     Medications:  Scheduled Meds:   sodium chloride flush  5-40 mL IntraVENous 2 times per day    enoxaparin  1 mg/kg SubCUTAneous BID    phytonadione  5 mg Oral Once    sodium chloride  80 mL IntraVENous Once    sodium chloride flush  5-40 mL IntraVENous 2 times per day    metroNIDAZOLE  500 mg IntraVENous Q8H    ciprofloxacin  400 mg IntraVENous Q24H     Continuous Infusions:   sodium chloride Stopped (10/18/22 8523)    lactated ringers 100 mL/hr at 10/18/22 0839       INPUT/OUTPUT:  In: 5113.4 [I.V.:3590.1]  Out: 930 [Urine:680]  Date 10/18/22 0000 - 10/18/22 2359   Shift 3907-4698 3186-9198 3670-5444 24 Hour Total   INTAKE   I.V.(mL/kg) 791. 1(14.3) 301. 1(5.4)  1092.2(19.7)   IV Piggyback(mL/kg) 98.4(1.8) 25.4(0.5)  123.8(2.2)   Shift Total(mL/kg) 889. 5(16.1) 326.5(5.9)  1216(21.9)   OUTPUT   Urine(mL/kg/hr) 160(0.4)   160   Emesis/NG output(mL/kg) 150(2.7)   150(2.7)   Shift Total(mL/kg) 310(5.6)   310(5.6)   Weight (kg) 55.4 55.4 55.4 55.4        LABS:  ABGs:   Recent Labs     10/17/22  1852   POCPH 7.280*   POCPCO2 38.8   POCPO2 354.1*   POCHCO3 18.2*   CHRI7AMI 100*     CBC:   Recent Labs     10/17/22  0655 10/17/22  1855   WBC 5.5 4.7   HGB 14.3 12.9   HCT 43.8 38.8   MCV 95.5 95.1    148   LYMPHOPCT 56*  --    RBC 4.58 4.08   MCH 31.3 31.6   MCHC 32.7 33.2   RDW 14.6 14.2     CRP:   No results for input(s): CRP in the last 72 hours. LDH:   No results for input(s): LDH in the last 72 hours. BMP:   Recent Labs     10/17/22  0655 10/17/22  1855 10/18/22  0503    137 135   K 4.2 4.5 4.4    107 105   CO2 22 16* 17*   BUN 17 14 15   CREATININE 1.02* 0.97* 0.89   GLUCOSE 138* 109* 105*     Liver Function Test:   Recent Labs     10/17/22  0655   PROT 7.5   LABALBU 3.8   ALT 9   AST 20   ALKPHOS 132*   BILITOT 0.5     Coagulation Profile:   Recent Labs     10/17/22  0655   INR 2.8   PROTIME 27.4*   APTT 34.9*     D-Dimer:  No results for input(s): DDIMER in the last 72 hours. Lactic Acid:  Recent Labs     10/17/22  0655 10/17/22  1855 10/18/22  0503   LACTA 3.7* 5.2* 4.4*     Cardiac Enzymes:  No results for input(s): CKTOTAL, CKMB, CKMBINDEX, TROPONINI in the last 72 hours. Invalid input(s): TROPONIN, HSTROP  BNP/ProBNP:   No results for input(s): BNP, PROBNP in the last 72 hours. Triglycerides:  No results for input(s): TRIG in the last 72 hours.      Microbiology:  Urine Culture:  No components found for: CURINE  Blood Culture:  No components found for: CBLOOD, CFUNGUSBL  Sputum Culture:  No components found for: CSPUTUM  No results for input(s): SPECDESC, SPECIAL, CULTURE, STATUS, ORG, CDIFFTOXPCR, CAMPYLOBPCR, SALMONELLAPC, SHIGAPCR, SHIGELLAPCR, MPNEUG, MPNEUM, LACTOQL in the last 72 hours. No results for input(s): SPUTUM, SPECDESC, SPECIAL, CULTURE, STATUS, ORG, CDIFFTOXPCR, MPNEUM, MPNEUG in the last 72 hours. Invalid input(s): CURINE, CBLOOD, CFUNGUSBL     Pathology:    Radiology Reports:  XR CHEST PORTABLE   Final Result   1. Endotracheal tube in expected position. 2. Central venous catheter terminates in the right atrium. 3. Enteric tube is below the diaphragm, extending beyond the field of view. CT ABDOMEN PELVIS W IV CONTRAST Additional Contrast? None   Final Result   1. Interval development of ascites. Overall small volume. 2. In the anterior mid abdomen some loops of small bowel borderline dilated   with 2 small gas pockets along the posterior aspect worrisome for   extraluminal gas secondary to contained perforation. 3. No bowel obstruction. Findings were discussed with Dr Wesley Easley at 8:18 am on 10/17/2022. Echocardiogram:   No results found for this or any previous visit. No results found for this or any previous visit. ASSESSMENT AND PLAN     Assessment:    // Postoperative respiratory insufficiency, s/p extubation  // Bowel perforation  // Fecal peritonitis  // S/p exploratory laparotomy and small bowel resection    Plan:    I personally interviewed/examined the patient; reviewed interval history, interpreted all available radiographic and laboratory data at the time of service.     Patient was left intubated after the procedure and remained on mechanical ventilation overnight  This morning she was following commands and tolerated spontaneous breathing trial  Extubated without any immediate complications  Continue supplemental oxygen to keep oxygen saturation greater than 92%  Encourage incentive spirometry/Acapella  Continue bronchopulmonary hygiene, aspiration precautions  Antimicrobials reviewed; currently on Cipro and Flagyl   Will recommend chemical DVT prophylaxis when okay with general surgery  Glycemic control appropriate  Skin/wound care reviewed with the nursing staff  Physical/occupational therapy    Critical care time of 30 minutes was spent (excluding procedures), in coordination of care during bedside rounds and discussion of patient care in detail, and recommendations of the team were adopted in the plan. Necessity of all invasive devices was also confirmed. Faisal Escobedo MD  Pulmonary and Critical Care Medicine           10/18/2022    Please note that this chart was generated using voice recognition Dragon dictation software. Although every effort was made to ensure the accuracy of this automated transcription, some errors in transcription may have occurred.

## 2022-10-18 NOTE — PROGRESS NOTES
[facility]  Office: [address], DO, Chasidy Yoder, DO, Jayce Garcia, DO, Kayla Packer Blood, DO, Lyly Rogers MD, Mack Dancer, MD, Comfort Díaz MD, Gaurav Gutierrez MD,  Jose Dale MD, Celi Sorenson MD, Jose Raul Blue, DO, Jenny Morales MD,  Maryann Lake MD, Meliton Fatima MD, Mayelin Byrne, DO, Lisset Cole MD, Adan Neal MD, Javed Gaming MD, Kerri Santiago MD, Hiwot Orellana MD, Kamron Mario MD, Mitali Mace, DO, Luther Knott MD, Shayla Nelson MD, Janett Julien, CNP,  Mariam Conteh, CNP, Nate Hays, CNP, Mehnaz Hernández, CNP,  Cammie Crawford, [facility], Maik Moreira, CNP, Vania Hernandez, CNP, Ismael Garcia, CNP, Mann Jolly, CNP, Rafy Lopez, CNP, Vinod Bradley, PA-C, Shayan Sorenson, CNS, Marco Antonio Garcia, DNP, Hawk Sanchez, CNP, Jamilah Ledesma, CNP, Unknown Pump, [address]    Progress Note    10/18/2022    2:20 PM    Name:   Laurence Lynn  MRN:     [id number]     Acct:      [de-identified]   Room:   [address] [place] [address] Day:  1  Admit Date:  10/17/2022  6:39 AM    PCP:   Lavelle Serrano MD  Code Status:  DNR-CCA    Subjective:     C/C:   Chief Complaint   Patient presents with    Abdominal Pain     Interval History Status: improved. Patient was seen and examined at bedside this AM. She is POD #1 s/p exploratory laparotomy with small bowel resection and abdominal washout. The patient was successfully extubated this afternoon. Brief History:     Patient is a 31-year-old female with a past medical history of chronic atrial fibrillation (on coumadin) who presented to the emergency department on 10/17/2022 complaining of abdominal pain. CT scan of the abdomen and pelvis was done in the ED and was remarkable for small bowel perforation. General surgery was consulted and took the patient to the OR following admission.  The patient is s/p exploratory laparotomy with small bowel resection and abdominal washout. Review of Systems:     Constitutional:  negative for chills, fevers, sweats  Respiratory:  negative for cough, dyspnea on exertion, shortness of breath, wheezing  Cardiovascular:  negative for chest pain, chest pressure/discomfort, lower extremity edema, palpitations  Gastrointestinal:  negative for abdominal pain, constipation, diarrhea, nausea, vomiting  Neurological:  negative for dizziness, headache    Medications: Allergies: Allergies   Allergen Reactions    Latex     Penicillins     Tylenol [Acetaminophen]        Current Meds:   Scheduled Meds:    sodium chloride flush  5-40 mL IntraVENous 2 times per day    enoxaparin  1 mg/kg SubCUTAneous BID    sodium chloride  80 mL IntraVENous Once    sodium chloride flush  5-40 mL IntraVENous 2 times per day    metroNIDAZOLE  500 mg IntraVENous Q8H    ciprofloxacin  400 mg IntraVENous Q24H     Continuous Infusions:    sodium chloride Stopped (10/18/22 0822)    lactated ringers 100 mL/hr at 10/18/22 0839     PRN Meds: midazolam, sodium chloride flush, sodium chloride flush, sodium chloride, potassium chloride **OR** potassium alternative oral replacement **OR** potassium chloride, magnesium sulfate, ondansetron **OR** ondansetron, morphine **OR** morphine, fentanNYL    Data:     Past Medical History:   has a past medical history of Atrial fibrillation, Back pain, chronic, Dementia ([address] GERD (gastroesophageal reflux disease), Hiatal hernia, History of breast cancer, History of glaucoma, Hypertension, benign essential, goal below 140/90, and Hypothyroid. Social History:   reports that she has never smoked. She has never used smokeless tobacco. She reports that she does not drink alcohol and does not use drugs.      Family History:   Family History   Problem Relation Age of Onset    No Known Problems Mother     No Known Problems Father        Vitals:  BP (!) 114/51   Pulse 84   Temp 98.4 °F (36.9 °C)   Resp 23 Ht 4' 11\" (1.499 m)   Wt 122 lb 2.2 oz (55.4 kg)   SpO2 97%   BMI 24.67 kg/m²   Temp (24hrs), Av.8 °F (37.1 °C), Min:98.1 °F (36.7 °C), Max:100 °F (37.8 °C)    No results for input(s): POCGLU in the last 72 hours. I/O (24Hr): Intake/Output Summary (Last 24 hours) at 10/18/2022 1420  Last data filed at 10/18/2022 0839  Gross per 24 hour   Intake 3913.7 ml   Output 930 ml   Net 2983.7 ml       Labs:  Hematology:  Recent Labs     10/17/22  0655 10/17/22  1855   WBC 5.5 4.7   RBC 4.58 4.08   HGB 14.3 12.9   HCT 43.8 38.8   MCV 95.5 95.1   MCH 31.3 31.6   MCHC 32.7 33.2   RDW 14.6 14.2    148   MPV 7.3 7.7   INR 2.8  --      Chemistry:  Recent Labs     10/17/22  0655 10/17/22  1855 10/18/22  0503    137 135   K 4.2 4.5 4.4    107 105   CO2 22 16* 17*   GLUCOSE 138* 109* 105*   BUN 17 14 15   CREATININE 1.02* 0.97* 0.89   MG 2.0  --   --    ANIONGAP 13 14 13   LABGLOM 52* 56* >60   CALCIUM 9.4 8.2* 8.3*     Recent Labs     10/17/22  06   PROT 7.5   LABALBU 3.8   AST 20   ALT 9   ALKPHOS 132*   BILITOT 0.5   LIPASE 22     ABG:  Lab Results   Component Value Date/Time    POCPH 7.280 10/17/2022 06:52 PM    POCPCO2 38.8 10/17/2022 06:52 PM    POCPO2 354.1 10/17/2022 06:52 PM    POCHCO3 18.2 10/17/2022 06:52 PM    NBEA 8 10/17/2022 06:52 PM    YVZX3YJU 100 10/17/2022 06:52 PM    FIO2 30.0 10/18/2022 05:04 AM     Lab Results   Component Value Date/Time    SPECIAL NOT REPORTED 2017 04:50 PM     Lab Results   Component Value Date/Time    CULTURE NO SIGNIFICANT GROWTH 2017 04:50 PM    CULTURE  2017 04:50 PM     Performed at [facility] [address], [address] [place] [address] [phone]       Radiology:  CT ABDOMEN PELVIS W IV CONTRAST Additional Contrast? None    Result Date: 10/17/2022  1. Interval development of ascites. Overall small volume.  2. In the anterior mid abdomen some loops of small bowel borderline dilated with 2 small gas pockets along the posterior aspect worrisome for extraluminal gas secondary to contained perforation. 3. No bowel obstruction. Findings were discussed with Dr Martina Arriola at 8:18 am on 10/17/2022. XR CHEST PORTABLE    Result Date: 10/17/2022  1. Endotracheal tube in expected position. 2. Central venous catheter terminates in the right atrium. 3. Enteric tube is below the diaphragm, extending beyond the field of view. Physical Examination:     General appearance:  alert, cooperative and no distress  Mental Status:  oriented to person, place and time and normal affect  Lungs:  clear to auscultation bilaterally, normal effort  Heart:  regular rate and rhythm, no murmur  Abdomen:  Surgical wounds appreciated on abdomen; clean and dry   Extremities:  no edema, redness, tenderness in the calves  Skin:  no gross lesions, rashes, induration    Assessment:     Hospital Problems             Last Modified POA    * (Principal) Perforated viscus 10/17/2022 Yes    Hypothyroidism (Chronic) 10/18/2022 Yes    Overview Signed 6/5/2016 11:35 AM by [facility]     Replacing Inactive Diagnoses         Chronic atrial fibrillation ([facility] [address] (Chronic) 10/18/2022 Yes    Dementia with behavioral disturbance (Chronic) 10/18/2022 Yes    Abdominal pain 10/17/2022 Yes       Plan:      Bowel perforation   -S/P exploratory laparotomy with small bowel resection and abdominal washout 10/17   -Patient was successfully extubated this afternoon   -NG-tube remains in place   -Continue ciprofloxacin and metronidazole  -Daily CBC   -Daily BMP     Chronic atrial fibrillation   -Continue Lovenox 60 mg bid     Alycia Nixon MD  10/18/2022  2:20 PM

## 2022-10-18 NOTE — PROGRESS NOTES
Dr. Felix Bean at bedside, RR increased to 20,  Read xray and tube in in good position. Wean in AM when pt is awake.   Call him (service )if pt is ready to extubate in AM.

## 2022-10-18 NOTE — PROGRESS NOTES
SBT initiated with peep of 5, ps -10. HR-79, RR-12-20, spo2 99%,etco2-32. Pt resting quietly, no distress. Rn weaning propofol every 5 min at this time. 0900 Pt continues to tolerate wean trial, vitals remain stable. Pt following commands, cuff leak present. Following oral and endotracheal suction pt was extubated. Pt on room air at this time with spo2 95%.

## 2022-10-18 NOTE — FLOWSHEET NOTE
[address] - [address] [facility]  PROGRESS NOTE    Shift date: 10/18/22  Shift day: Tuesday   Shift # 1    Room # 337/337-01   Name: Juventino Dietrich                Buddhism: [employer]BUR[employer]'P Witness    Referral: Routine Visit    Admit Date & Time: 10/17/2022  6:39 AM    Assessment:  Juventino Dietrich is a 80 y.o. female in the hospital. Upon entering the room writer observes Patient's family (three children and three grandchildren) are standing next to Patient who is lying in bed and has her eyes closed. Family introduced themselves to writer. Family reported that Pt was doing better today. They shared that Pt is Shinto and has been most of her life. They told writer that if they needed prayer they would contact her. Intervention:  Writer introduced self and title as   . Writer asked Family to introduce themselves. Writer asked how Pt and Family were doing. Writer inquired if Pt and/or family had any spiritual or Congregation needs. Writer offered words of support and encouragement. Writer told Family she will be on site the rest of the day and is available for support. Outcome:  Family shared how Pt was doing. Family spoke of Pt's Congregation affiliation. Family told writer they would call her if they needed prayer. They thanked writer for the visit. Plan:  Chaplains will remain available to offer spiritual and emotional support as needed. 10/18/22 1217   Encounter Summary   Service Provided For: Family; Patient not available   Referral/Consult From: [address] [place] [address] Family members; Children;Confucianism/gi community   Last Encounter  10/18/22   Complexity of Encounter Moderate   Begin Time 0945   End Time  0950   Total Time Calculated 5 min   Encounter    Type Initial Screen/Assessment   Spiritual/Emotional needs   Type Spiritual Support   Assessment/Intervention/Outcome   Assessment Coping; Unable to assess   Intervention Sustaining Presence/Ministry of presence; Explored/Affirmed feelings, thoughts, concerns   Outcome Expressed Gratitude;Coping   Plan and Referrals   Plan/Referrals No future visits requested       Electronically signed by Milton Sullivan, on 10/18/2022 at 12:19 PM.  [facility]  [phone]

## 2022-10-18 NOTE — PROGRESS NOTES
Physical Therapy  Facility/Department: [facility] SURG ICU  Physical Therapy Initial Assessment    Name: Adolph Ontiveros  : 1932  MRN: [id number]  Date of Service: 10/18/2022  Chief Complaint   Patient presents with    Abdominal Pain        Discharge Recommendations:  Patient would benefit from continued therapy after discharge   PT Equipment Recommendations  Equipment Needed: No  Other: Pt has a rollator and cane at home per patient's daughter. Patient Diagnosis(es): The primary encounter diagnosis was Abdominal pain, unspecified abdominal location. A diagnosis of Bowel perforation ([address] was also pertinent to this visit. Past Medical History:  has a past medical history of Atrial fibrillation, Back pain, chronic, Dementia ([address] GERD (gastroesophageal reflux disease), Hiatal hernia, History of breast cancer, History of glaucoma, Hypertension, benign essential, goal below 140/90, and Hypothyroid. Past Surgical History:  has a past surgical history that includes Cholecystectomy; Breast surgery; Cataract removal with implant; bladder repair; and laparotomy (N/A, 10/17/2022). Assessment   Body Structures, Functions, Activity Limitations Requiring Skilled Therapeutic Intervention: Decreased functional mobility ; Decreased balance;Decreased strength;Decreased safe awareness;Decreased endurance  Assessment: Pt exhibits decreased overall strength and activity tolerance with impaired mobility at this time due to abdominal surgery. Pt required Min-Mod assist x 2 for bed mobility and Min-CGA for transfers to chair. Pt unable to ambulate due to NG drain/suction. Patient would be unsafe to return home at this time but further progress is expected during acute stay. Pt will require 24/7 assistance at discharge and may benefit from further therapy.   Treatment Diagnosis: decreased mobility  Therapy Prognosis: Good  Decision Making: Medium Complexity  Requires PT Follow-Up: Yes  Activity Tolerance  Activity Tolerance: Patient limited by fatigue;Patient limited by endurance     Plan   Physcial Therapy Plan  General Plan:  (5-6x/week)  Current Treatment Recommendations: Strengthening, Balance training, Functional mobility training, Transfer training, Gait training, Endurance training, Safety education & training, Therapeutic activities, Patient/Caregiver education & training  Safety Devices  Type of Devices: Chair alarm in place, Gait belt, Left in chair, Nurse notified, Call light within reach  Restraints  Restraints Initially in Place: No     Restrictions  Restrictions/Precautions  Restrictions/Precautions: NPO  Required Braces or Orthoses?: No  Position Activity Restriction  Other position/activity restrictions: NPO with NP drain/suction     Subjective   General  Patient assessed for rehabilitation services?: Yes  Family / Caregiver Present: Yes  Referring Practitioner: Kate Sharpe  Referral Date : 10/17/22  Diagnosis: Perforated viscus  Follows Commands: Within Functional Limits  Subjective  Subjective: Pt supine in bed and agreeable to therapy.  Pt reports stomach pain minimal.         Social/Functional History  Social/Functional History  Lives With: Daughter  Type of Home: House  Home Layout: Two level, Able to Live on Main level with bedroom/bathroom, Laundry in basement, Performs ADL's on one level  Home Access: Stairs to enter without rails  Entrance Stairs - Number of Steps: 2  Bathroom Shower/Tub: Walk-in shower  Bathroom Toilet: Standard  Bathroom Equipment: Grab bars in shower, Shower chair  Home Equipment: Cane, Rollator  Has the patient had two or more falls in the past year or any fall with injury in the past year?: Yes  Receives Help From: Family  ADL Assistance: Independent  Homemaking Assistance: Needs assistance  Ambulation Assistance:  (Cane and/or furniture walks)  Transfer Assistance: Independent  Active : No  Vision/Hearing  Vision  Vision: Impaired (macular degeneration)  Hearing  Hearing: Exceptions to [facility]  Hearing Exceptions: Bilateral hearing aid;Hard of hearing/hearing concerns    Cognition   Orientation  Overall Orientation Status: Within Functional Limits  Orientation Level: Oriented X4  Cognition  Overall Cognitive Status: Exceptions  Arousal/Alertness: Appropriate responses to stimuli  Following Commands: Follows one step commands consistently; Follows one step commands with increased time  Attention Span: Attends with cues to redirect  Memory: Appears intact  Safety Judgement: Decreased awareness of need for assistance;Decreased awareness of need for safety  Problem Solving: Decreased awareness of errors;Assistance required to generate solutions;Assistance required to implement solutions;Assistance required to correct errors made  Insights: Decreased awareness of deficits  Initiation: Requires cues for some  Sequencing: Requires cues for some     Objective      Gross Assessment  AROM: Within functional limits  PROM: Within functional limits  Strength: Generally decreased, functional  Coordination: Within functional limits  Tone: Normal  Sensation: Intact     AROM RLE (degrees)  RLE AROM: WFL  AROM LLE (degrees)  LLE AROM : WFL  Strength RLE  Strength RLE: WFL  Comment: grossly 4-/5  Strength LLE  Strength LLE: WFL  Comment: grossly 4-/5           Bed mobility  Supine to Sit: Minimal assistance; Moderate assistance;2 Person assistance  Scooting: Minimal assistance  Bed Mobility Comments: Head of bed elevated 40 degrees with use of rail; increased time and effort; cues for proper technique. Pt retired to chair at end of session. Transfers  Sit to Stand: Minimal Assistance;2 Person Assistance;Contact guard assistance  Stand to Sit: Minimal Assistance;2 Person Assistance;Contact guard assistance  Stand Pivot Transfers: Minimal Assistance;2 Person Assistance  Comment: Transfers without device.   Ambulation  Surface:  (Pt pivoted to chair only as unable to ambulate due to NG tube/suction in place.) Balance  Posture: Fair  Sitting - Static: Good  Sitting - Dynamic: Fair;+  Standing - Static: Fair;-  Standing - Dynamic: Fair;-  Comments: balance assessed without device           OutComes Score                                                  AM-PAC Score  AM-PAC Inpatient Mobility Raw Score : 14 (10/18/22 1531)  AM-PAC Inpatient T-Scale Score : 38.1 (10/18/22 1531)  Mobility Inpatient CMS 0-100% Score: 61.29 (10/18/22 1531)  Mobility Inpatient CMS G-Code Modifier : CL (10/18/22 1531)         Goals  Short Term Goals  Time Frame for Short Term Goals: 14 visits  Short Term Goal 1: Pt to be Independent with bed mobility. Short Term Goal 2: Pt to transfer with Modified (I) with device if needed without LOB. Short Term Goal 3: Pt to ambulate with least restrictive device Modified (I) 100' without LOB. Short Term Goal 4: Pt to perform LE PRE's seated x 20 reps to improve strength for mobility. Patient Goals   Patient Goals : None stated       Education  Patient Education  Education Given To: Patient; Family  Education Provided: Role of Therapy;Plan of Care;Transfer Training  Education Method: Demonstration;Verbal  Barriers to Learning: None  Education Outcome: Verbalized understanding;Demonstrated understanding;Continued education needed      Therapy Time   Individual Concurrent Group Co-treatment   Time In 1427         Time Out 1507         Minutes 40         Timed Code Treatment Minutes: [address] PT

## 2022-10-18 NOTE — PROGRESS NOTES
Occupational Therapy  Facility/Department: [facility]  Occupational Therapy Initial Assessment      Name: Adolph Ontiveros  : 1932  MRN: [id number]  Date of Service: 10/18/2022    Chief Complaint   Patient presents with    Abdominal Pain     Discharge Recommendations:  Patient would benefit from continued therapy after discharge  OT Equipment Recommendations  Other: Will continue to make appropriate DME / AD recommendations. Patient Diagnosis(es): The primary encounter diagnosis was Abdominal pain, unspecified abdominal location. A diagnosis of Bowel perforation ([address] was also pertinent to this visit. Past Medical History:  has a past medical history of Atrial fibrillation, Back pain, chronic, Dementia ([address] GERD (gastroesophageal reflux disease), Hiatal hernia, History of breast cancer, History of glaucoma, Hypertension, benign essential, goal below 140/90, and Hypothyroid. Past Surgical History:  has a past surgical history that includes Cholecystectomy; Breast surgery; Cataract removal with implant; bladder repair; and laparotomy (N/A, 10/17/2022). Assessment   Performance deficits / Impairments: Decreased functional mobility ; Decreased endurance;Decreased coordination;Decreased ADL status; Decreased balance;Decreased strength;Decreased vision/visual deficit; Decreased safe awareness;Decreased high-level IADLs;Decreased cognition  Assessment: Pt is very motivated to regain functional independence with daily tasks and overall endurance. Pt currently has deficits / impairments which impact her ability to return to prior level of functioning; will benefit from continued participation in OT services to improve independent skills / functions.   Prognosis: Good;Fair  Decision Making: Medium Complexity  REQUIRES OT FOLLOW-UP: Yes  Activity Tolerance  Activity Tolerance: Patient limited by fatigue;Treatment limited secondary to decreased cognition;Patient Tolerated treatment well Plan   Occupational Therapy Plan  Times Per Week: 5-6x/week  Times Per Day: Once a day  Current Treatment Recommendations: Strengthening, ROM, Balance training, Functional mobility training, Endurance training, Equipment evaluation, education, & procurement, Patient/Caregiver education & training, Safety education & training, Pain management, Self-Care / ADL, Home management training, Cognitive/Perceptual training       Restrictions  Restrictions/Precautions  Restrictions/Precautions: NPO, Surgical Protocols  Required Braces or Orthoses?: No  Position Activity Restriction  Other position/activity restrictions: NPO with NP Drain / Suction. Up w/ Assist.  Abdominal Restrictions (s/p Ex Lap 10/17). Subjective   General  Patient assessed for rehabilitation services?: Yes  Family / Caregiver Present: Yes (Several family members (including daughter whom she lives with))  Diagnosis: S/P Exploratory Laparotomy with Small Bowel Resection and Abdominal Washout (10/17/22 with Dr. Lesly Mejia). Subjective  Subjective: RN approved Pt to be seen for OT Evaluation. General Comment  Comments: Pt was agreeable and cooperative throughout. Multiple members of Pt's family (including daughter with whom Pt lives) were present throughout. Social/Functional History  Social/Functional History  Lives With: Daughter  Type of Home: House  Home Layout: Two level, Able to Live on Main level with bedroom/bathroom, Performs ADL's on one level (Pt does not access the Basement (Laundry) or 2nd Floor. She uses / accesses (Mod I) the Main (1st floor). )  Home Access: Stairs to enter without rails  Entrance Stairs - Number of Steps: 2  Bathroom Shower/Tub: Walk-in shower  Bathroom Toilet: Standard  Bathroom Equipment: Grab bars in shower, Shower chair  Bathroom Accessibility: Accessible  Home Equipment: [address] (Per Pt's daughter, the Pt uses SPC primarily. Has Rollator, but does not use this as often.   Both SPC and Rollator fit in-home / in-bathroom.)  Has the patient had two or more falls in the past year or any fall with injury in the past year?: Yes  Receives Help From: Family  ADL Assistance: Independent  Homemaking Assistance: Needs assistance (Mod I for Meal Prep // Light Laundry // Light Clean-Up (limited by vision d/t Hx Macular Degeneration & Glaucoma). Pt's daughter completes all other IADLs. )  Ambulation Assistance: Independent  Transfer Assistance: Independent  Active : No  Additional Comments: Pt and family report that she is limited by Vision (d/t Macular Degeneration and Glaucoma). Is able to modify / adapt but often requires Setup for Meals (that she can then microwave) / Setup for IADLs. Objective   Safety Devices  Type of Devices: Chair alarm in place;Gait belt;Left in chair;Nurse notified;Call light within reach  Restraints  Restraints Initially in Place: No    Bed Mobility Training  Bed Mobility Training: Yes  Overall Level of Assistance:  (HOB elevated; Bilateral Bedrails; Increased time / effort. Mod x1 Assist + Min x1 Assist (2person assist). )  Interventions: Verbal cues;Manual cues; Visual cues; Safety awareness training (Mod Cues for task initiation // motor planning // accurate use of DME (integrating BUEs))  Supine to Sit: Moderate assistance;Assist X2;Minimum assistance; Additional time; Adaptive equipment (Mod x1 Assist + Min x1 Assist (2person assist). HOB elevated; Bilateral Bedrails; Increased time / effort.)  Scooting: Minimum assistance; Additional time; Adaptive equipment (HOB elevated; Left Bedrails; Increased time / effort.)    Balance  Sitting: Without support  Standing: With support (Fair- dynamic standing balance with UE support. CGA (x1). )  Transfer Training  Transfer Training: Yes  Interventions: Verbal cues; Visual cues;Manual cues; Safety awareness training (Mod Cues for task initiation // motor planning // accurate use of DME.)  Sit to Stand: Minimum assistance;Contact-guard assistance;Assist X2;Additional time (2 person assist (Min Assist + CGA). No DME / AD.)  Stand to Sit: Minimum assistance;Contact-guard assistance;Assist X2;Additional time (2 person assist (Min Assist + CGA). No DME / AD.)  Stand Pivot Transfers: Minimum assistance;Assist X2;Additional time (2 person assist (Min Assist + Min Assist). No DME / AD.)  Bed to Chair: Minimum assistance;Assist X2;Additional time  Toilet Transfer:  (Currently using sharp catheter.)    AROM: Within functional limits  Strength: Generally decreased, functional (BUE Gross MMT 3+/5)  Coordination: Within functional limits  Tone: Normal  Sensation: Intact    ADL  Feeding: NPO  Feeding Skilled Clinical Factors: Currently has NG tube (c suction). Grooming: Minimal assistance; Increased time to complete;Verbal cueing  Grooming Skilled Clinical Factors: Sitting upright in recliner chair, Pt completed Grooming (for facial hygiene) with Min Assist to manage NG tube. UE Bathing: Minimal assistance  UE Bathing Skilled Clinical Factors: Based on clinical judgement. LE Bathing: Maximum assistance  LE Bathing Skilled Clinical Factors: Based on clinical judgement. UE Dressing: Adaptive equipment; Increased time to complete;Verbal cueing;Minimal assistance; Moderate assistance  UE Dressing Skilled Clinical Factors: While sitting upright in recliner, Pt doffed gown ([address]), re-donned clean gown (Mod Assist). Increased difficulty / assistance this date to manage medical lines / NG tube. LE Dressing: Moderate assistance;Maximum assistance;Verbal cueing; Increased time to complete  LE Dressing Skilled Clinical Factors: Sitting EOB, Pt was able to doff / redon Bilateral socks (Right sock with Mod Assist; Left sock with Max Assist). No use of AE / DME. Toileting: Dependent/Total  Toileting Skilled Clinical Factors: Sharp catheter.     Activity Tolerance  Activity Tolerance: Patient limited by fatigue;Patient limited by endurance    Vision  Vision: Impaired  Vision Exceptions:  (Limited Vision (d/t Macular Degeneration and Glaucoma))  Hearing  Hearing: Exceptions to [facility]  Hearing Exceptions: Bilateral hearing aid;Hard of hearing/hearing concerns (Does not wear her hearing aids)    Cognition  Overall Cognitive Status: Exceptions  Arousal/Alertness: Delayed responses to stimuli  Following Commands: Follows one step commands with increased time; Follows one step commands with repetition  Attention Span: Attends with cues to redirect  Memory: Appears intact  Safety Judgement: Decreased awareness of need for assistance;Decreased awareness of need for safety  Problem Solving: Decreased awareness of errors;Assistance required to generate solutions;Assistance required to implement solutions;Assistance required to correct errors made;Assistance required to identify errors made  Insights: Decreased awareness of deficits  Initiation: Requires cues for some  Sequencing: Requires cues for some  Orientation  Overall Orientation Status: Within Functional Limits  Orientation Level: Oriented X4    Education Given To: Patient; Family  Education Provided: Role of Therapy;Plan of Care;Family Education;Precautions; Equipment;ADL Adaptive Strategies;Transfer Training;Energy Conservation; Fall Prevention Strategies  Education Method: Demonstration;Verbal  Barriers to Learning: None  Education Outcome: Verbalized understanding;Continued education needed      AM-PAC Score  AM-[facility] Inpatient Daily Activity Raw Score: 13 (10/18/22 1659)  -PAC Inpatient ADL T-Scale Score : 32.03 (10/18/22 1659)  ADL Inpatient CMS 0-100% Score: 63.03 (10/18/22 1659)  ADL Inpatient CMS G-Code Modifier : CL (10/18/22 1659)      Goals  Short Term Goals  Time Frame for Short Term Goals: Within 14 treatment sessions  Short Term Goal 1: Pt will demo Mod I and Good Integration of EC / Ax pacing during UB ADLs. Short Term Goal 2: Pt will demo SBA and Fair Integration of AE / DME during LB ADLs / Toileting.   Short Term Goal 3: Pt will participate in ADL and Bathroom Transfers with SBA and without LOB. Short Term Goal 4: Pt will maintain Fair Dynamic Standing Balance (8-10 mins) while participating in Leisure tasks. Short Term Goal 5: Pt will complete Functional Mobility (c item retrieval / transport) with Supervision Assist and Good Body Mechanics.        Therapy Time   Individual Concurrent Group Co-treatment   Time In 1284         Time Out 1507         Minutes 42         Timed Code Treatment Minutes: 23 Minutes (ADL + TherAct)       MIHIR Mooney, OTR/L

## 2022-10-18 NOTE — PROGRESS NOTES
Initial attempt at SBT. Pt placed on Peep 5/Ps 10 per protocol. Initially patient was breathing spontaneously but became apneic after several minutes. Pt placed back on full support. RN decreased the sedation further. Will try again in a few minutes.

## 2022-10-18 NOTE — PLAN OF CARE
Problem: Pain  Goal: Verbalizes/displays adequate comfort level or baseline comfort level  Outcome: Progressing   Pain scale preformed per protocol and pt treated for pain as documented. Education given. Problem: Safety - Adult  Goal: Free from fall injury  Outcome: Progressing   Fall assessment preformed. Bed in low locked position with call light and tray table within reach. Education given. Will continue to monitor. Problem: Respiratory - Adult  Goal: Achieves optimal ventilation and oxygenation  Outcome: Progressing  Flowsheets  Taken 10/18/2022 1200  Achieves optimal ventilation and oxygenation: Assess for changes in respiratory status  Taken 10/18/2022 0800  Achieves optimal ventilation and oxygenation: Assess for changes in respiratory status   . Coughing deep breathing encouraged. supplemental O2 used as ordered. Respiratory therapist at bedside for education.

## 2022-10-19 LAB
ABSOLUTE EOS #: 0 K/UL (ref 0–0.4)
ABSOLUTE LYMPH #: 0.8 K/UL (ref 1–4.8)
ABSOLUTE MONO #: 0.3 K/UL (ref 0.1–1.2)
ANION GAP SERPL CALCULATED.3IONS-SCNC: 7 MMOL/L (ref 9–17)
BASOPHILS # BLD: 0 % (ref 0–2)
BASOPHILS ABSOLUTE: 0 K/UL (ref 0–0.2)
BUN BLDV-MCNC: 14 MG/DL (ref 8–23)
CALCIUM SERPL-MCNC: 8.3 MG/DL (ref 8.6–10.4)
CHLORIDE BLD-SCNC: 109 MMOL/L (ref 98–107)
CO2: 23 MMOL/L (ref 20–31)
CREAT SERPL-MCNC: 0.87 MG/DL (ref 0.5–0.9)
EOSINOPHILS RELATIVE PERCENT: 0 % (ref 1–4)
GFR SERPL CREATININE-BSD FRML MDRD: >60 ML/MIN/1.73M2
GLUCOSE BLD-MCNC: 75 MG/DL (ref 65–105)
GLUCOSE BLD-MCNC: 84 MG/DL (ref 70–99)
GLUCOSE BLD-MCNC: 91 MG/DL (ref 65–105)
HCT VFR BLD CALC: 31.7 % (ref 36–46)
HEMOGLOBIN: 10.4 G/DL (ref 12–16)
LACTIC ACID: 1.6 MMOL/L (ref 0.5–2.2)
LYMPHOCYTES # BLD: 14 % (ref 24–44)
MCH RBC QN AUTO: 31.2 PG (ref 26–34)
MCHC RBC AUTO-ENTMCNC: 33 G/DL (ref 31–37)
MCV RBC AUTO: 94.8 FL (ref 80–100)
MONOCYTES # BLD: 5 % (ref 2–11)
PDW BLD-RTO: 14.3 % (ref 12.5–15.4)
PLATELET # BLD: 141 K/UL (ref 140–450)
PMV BLD AUTO: 7.6 FL (ref 6–12)
POTASSIUM SERPL-SCNC: 4.1 MMOL/L (ref 3.7–5.3)
RBC # BLD: 3.34 M/UL (ref 4–5.2)
SEG NEUTROPHILS: 81 % (ref 36–66)
SEGMENTED NEUTROPHILS ABSOLUTE COUNT: 4.3 K/UL (ref 1.8–7.7)
SODIUM BLD-SCNC: 139 MMOL/L (ref 135–144)
SURGICAL PATHOLOGY REPORT: NORMAL
WBC # BLD: 5.4 K/UL (ref 3.5–11)

## 2022-10-19 PROCEDURE — 85025 COMPLETE CBC W/AUTO DIFF WBC: CPT

## 2022-10-19 PROCEDURE — 6370000000 HC RX 637 (ALT 250 FOR IP): Performed by: NURSE PRACTITIONER

## 2022-10-19 PROCEDURE — 2000000000 HC ICU R&B

## 2022-10-19 PROCEDURE — 2580000003 HC RX 258: Performed by: SURGERY

## 2022-10-19 PROCEDURE — 94664 DEMO&/EVAL PT USE INHALER: CPT

## 2022-10-19 PROCEDURE — 2700000000 HC OXYGEN THERAPY PER DAY

## 2022-10-19 PROCEDURE — 2500000003 HC RX 250 WO HCPCS: Performed by: SURGERY

## 2022-10-19 PROCEDURE — 99232 SBSQ HOSP IP/OBS MODERATE 35: CPT | Performed by: STUDENT IN AN ORGANIZED HEALTH CARE EDUCATION/TRAINING PROGRAM

## 2022-10-19 PROCEDURE — 6360000002 HC RX W HCPCS: Performed by: SURGERY

## 2022-10-19 PROCEDURE — 97110 THERAPEUTIC EXERCISES: CPT

## 2022-10-19 PROCEDURE — 36415 COLL VENOUS BLD VENIPUNCTURE: CPT

## 2022-10-19 PROCEDURE — 6360000002 HC RX W HCPCS: Performed by: STUDENT IN AN ORGANIZED HEALTH CARE EDUCATION/TRAINING PROGRAM

## 2022-10-19 PROCEDURE — 6360000002 HC RX W HCPCS: Performed by: NURSE PRACTITIONER

## 2022-10-19 PROCEDURE — 97535 SELF CARE MNGMENT TRAINING: CPT

## 2022-10-19 PROCEDURE — 82947 ASSAY GLUCOSE BLOOD QUANT: CPT

## 2022-10-19 PROCEDURE — 99024 POSTOP FOLLOW-UP VISIT: CPT | Performed by: SURGERY

## 2022-10-19 PROCEDURE — 80048 BASIC METABOLIC PNL TOTAL CA: CPT

## 2022-10-19 PROCEDURE — 97530 THERAPEUTIC ACTIVITIES: CPT

## 2022-10-19 PROCEDURE — 6370000000 HC RX 637 (ALT 250 FOR IP): Performed by: SURGERY

## 2022-10-19 PROCEDURE — 2580000003 HC RX 258: Performed by: ANESTHESIOLOGY

## 2022-10-19 PROCEDURE — 94761 N-INVAS EAR/PLS OXIMETRY MLT: CPT

## 2022-10-19 PROCEDURE — 83605 ASSAY OF LACTIC ACID: CPT

## 2022-10-19 PROCEDURE — 97116 GAIT TRAINING THERAPY: CPT

## 2022-10-19 RX ORDER — DIPHENHYDRAMINE HYDROCHLORIDE 50 MG/ML
25 INJECTION INTRAMUSCULAR; INTRAVENOUS ONCE
Status: COMPLETED | OUTPATIENT
Start: 2022-10-19 | End: 2022-10-19

## 2022-10-19 RX ORDER — QUETIAPINE FUMARATE 25 MG/1
50 TABLET, FILM COATED ORAL ONCE
Status: DISCONTINUED | OUTPATIENT
Start: 2022-10-19 | End: 2022-10-24 | Stop reason: HOSPADM

## 2022-10-19 RX ORDER — TRAMADOL HYDROCHLORIDE 50 MG/1
50 TABLET ORAL EVERY 4 HOURS PRN
Status: DISCONTINUED | OUTPATIENT
Start: 2022-10-19 | End: 2022-10-24 | Stop reason: HOSPADM

## 2022-10-19 RX ORDER — GINSENG 100 MG
CAPSULE ORAL 2 TIMES DAILY
Status: DISCONTINUED | OUTPATIENT
Start: 2022-10-19 | End: 2022-10-24 | Stop reason: HOSPADM

## 2022-10-19 RX ADMIN — CIPROFLOXACIN 400 MG: 2 INJECTION, SOLUTION INTRAVENOUS at 09:33

## 2022-10-19 RX ADMIN — METRONIDAZOLE 500 MG: 500 INJECTION, SOLUTION INTRAVENOUS at 18:00

## 2022-10-19 RX ADMIN — SODIUM CHLORIDE, PRESERVATIVE FREE 10 ML: 5 INJECTION INTRAVENOUS at 21:16

## 2022-10-19 RX ADMIN — METRONIDAZOLE 500 MG: 500 INJECTION, SOLUTION INTRAVENOUS at 09:34

## 2022-10-19 RX ADMIN — MORPHINE SULFATE 2 MG: 2 INJECTION, SOLUTION INTRAMUSCULAR; INTRAVENOUS at 10:27

## 2022-10-19 RX ADMIN — ENOXAPARIN SODIUM 60 MG: 100 INJECTION SUBCUTANEOUS at 21:11

## 2022-10-19 RX ADMIN — METRONIDAZOLE 500 MG: 500 INJECTION, SOLUTION INTRAVENOUS at 01:14

## 2022-10-19 RX ADMIN — DIPHENHYDRAMINE HYDROCHLORIDE 25 MG: 50 INJECTION INTRAMUSCULAR; INTRAVENOUS at 21:22

## 2022-10-19 RX ADMIN — BACITRACIN: 500 OINTMENT TOPICAL at 09:34

## 2022-10-19 RX ADMIN — BACITRACIN: 500 OINTMENT TOPICAL at 21:11

## 2022-10-19 RX ADMIN — ONDANSETRON 4 MG: 2 INJECTION INTRAMUSCULAR; INTRAVENOUS at 10:27

## 2022-10-19 RX ADMIN — SODIUM CHLORIDE, PRESERVATIVE FREE 10 ML: 5 INJECTION INTRAVENOUS at 11:33

## 2022-10-19 RX ADMIN — ENOXAPARIN SODIUM 60 MG: 100 INJECTION SUBCUTANEOUS at 09:33

## 2022-10-19 RX ADMIN — SODIUM CHLORIDE, PRESERVATIVE FREE 10 ML: 5 INJECTION INTRAVENOUS at 21:14

## 2022-10-19 RX ADMIN — SODIUM CHLORIDE, POTASSIUM CHLORIDE, SODIUM LACTATE AND CALCIUM CHLORIDE: 600; 310; 30; 20 INJECTION, SOLUTION INTRAVENOUS at 01:16

## 2022-10-19 RX ADMIN — TRAMADOL HYDROCHLORIDE 50 MG: 50 TABLET, COATED ORAL at 23:45

## 2022-10-19 ASSESSMENT — PAIN SCALES - GENERAL
PAINLEVEL_OUTOF10: 2
PAINLEVEL_OUTOF10: 0
PAINLEVEL_OUTOF10: 7
PAINLEVEL_OUTOF10: 0
PAINLEVEL_OUTOF10: 3

## 2022-10-19 ASSESSMENT — PAIN DESCRIPTION - LOCATION: LOCATION: ABDOMEN

## 2022-10-19 NOTE — PROGRESS NOTES
[facility]  Office: [address], DO, Alexa Araceli, DO, Michael Phlegm, DO, Vamshi Baker Blood, DO, Katt Cárdenas MD, Mio Mendosa MD, Murali Carvalho MD, Radha Ivan MD,  Bren Ha MD, Jerry Tinajero MD, Lindsay Moore, DO, Roger Marin MD,  Catina Rodriguez MD, Niko Marmolejo MD, Rica Rodriguez, DO, Román Rico MD, Jessica Soriano MD, Drew Ybarra MD, Denise Nelson MD, Afsaneh Robles MD, Dayanna Varma MD, Good Kruse DO, Dee Kilgore MD, Timbo Bradshaw MD, Maicol Mueller CNP,  Madeline Marie, CNP, Vanessa Stratton, CNP, Earl Jackson, CNP,  Paul Olson, [facility], Rojelio Blair, CNP, Milena Mas, CNP, Alejandra Duran, CNP, Wily Das, CNP, Antony Libman, CNP, Shana Severino PA-C, Amber Briseno, CNS, Leti Plunkett, [facility], Jayce Ballesteros, CNP, Bettye Sanchez, CNP, Miley Abebe 0617    Progress Note    10/19/2022    8:50 AM    Name:   Simba Mccoy  MRN:     [id number]     Acct:      [de-identified]   Room:   [address] [place] [address] Day:  2  Admit Date:  10/17/2022  6:39 AM    PCP:   Familia Cook MD  Code Status:  DNR-CCA    Subjective:     C/C:   Chief Complaint   Patient presents with    Abdominal Pain     Interval History Status: improved. Patient was seen and examined at bedside this AM. She is POD #2 s/p exploratory laparotomy with small bowel resection and abdominal washout. The patient is resting comfortably and is without complaint. She pulled her NG-tube out overnight. Plan to leave NG-tube out and advance diet to clear liquids today. Brief History:     Patient is a 27-year-old female with a past medical history of chronic atrial fibrillation (on coumadin) who presented to the emergency department on 10/17/2022 complaining of abdominal pain. CT scan of the abdomen and pelvis was done in the ED and was remarkable for small bowel perforation.  General surgery was consulted and took the patient to the OR following admission. The patient is s/p exploratory laparotomy with small bowel resection and abdominal washout. Review of Systems:     Constitutional:  negative for chills, fevers, sweats  Respiratory:  negative for cough, dyspnea on exertion, shortness of breath, wheezing  Cardiovascular:  negative for chest pain, chest pressure/discomfort, lower extremity edema, palpitations  Gastrointestinal:  negative for abdominal pain, constipation, diarrhea, nausea, vomiting  Neurological:  negative for dizziness, headache    Medications: Allergies: Allergies   Allergen Reactions    Latex     Penicillins     Tylenol [Acetaminophen]        Current Meds:   Scheduled Meds:    bacitracin   Topical BID    sodium chloride flush  5-40 mL IntraVENous 2 times per day    enoxaparin  1 mg/kg SubCUTAneous BID    phytonadione  5 mg Oral Once    tranexamic acid  1,000 mg Topical Once    sodium chloride  80 mL IntraVENous Once    sodium chloride flush  5-40 mL IntraVENous 2 times per day    metroNIDAZOLE  500 mg IntraVENous Q8H    ciprofloxacin  400 mg IntraVENous Q24H     Continuous Infusions:    sodium chloride Stopped (10/18/22 0822)    lactated ringers Stopped (10/19/22 0538)     PRN Meds: midazolam, sodium chloride flush, sodium chloride flush, sodium chloride, potassium chloride **OR** potassium alternative oral replacement **OR** potassium chloride, magnesium sulfate, ondansetron **OR** ondansetron, morphine **OR** morphine, fentanNYL    Data:     Past Medical History:   has a past medical history of Atrial fibrillation, Back pain, chronic, Dementia ([address] GERD (gastroesophageal reflux disease), Hiatal hernia, History of breast cancer, History of glaucoma, Hypertension, benign essential, goal below 140/90, and Hypothyroid. Social History:   reports that she has never smoked. She has never used smokeless tobacco. She reports that she does not drink alcohol and does not use drugs. Family History:   Family History   Problem Relation Age of Onset    No Known Problems Mother     No Known Problems Father        Vitals:  BP (!) 115/57   Pulse 82   Temp 97.4 °F (36.3 °C) (Temporal)   Resp 21   Ht 4' 11\" (1.499 m)   Wt 121 lb 7.6 oz (55.1 kg)   SpO2 97%   BMI 24.53 kg/m²   Temp (24hrs), Av.8 °F (36.6 °C), Min:97.2 °F (36.2 °C), Max:98.4 °F (36.9 °C)    No results for input(s): POCGLU in the last 72 hours. I/O (24Hr):     Intake/Output Summary (Last 24 hours) at 10/19/2022 0850  Last data filed at 10/19/2022 0649  Gross per 24 hour   Intake 1349.92 ml   Output 2730 ml   Net -1380.08 ml       Labs:  Hematology:  Recent Labs     10/17/22  0655 10/17/22  1855 10/19/22  0523   WBC 5.5 4.7 5.4   RBC 4.58 4.08 3.34*   HGB 14.3 12.9 10.4*   HCT 43.8 38.8 31.7*   MCV 95.5 95.1 94.8   MCH 31.3 31.6 31.2   MCHC 32.7 33.2 33.0   RDW 14.6 14.2 14.3    148 141   MPV 7.3 7.7 7.6   INR 2.8  --   --      Chemistry:  Recent Labs     10/17/22  0655 10/17/22  1855 10/18/22  0503 10/19/22  0523    137 135 139   K 4.2 4.5 4.4 4.1    107 105 109*   CO2 22 16* 17* 23   GLUCOSE 138* 109* 105* 84   BUN 17 14 15 14   CREATININE 1.02* 0.97* 0.89 0.87   MG 2.0  --   --   --    ANIONGAP 13 14 13 7*   LABGLOM 52* 56* >60 >60   CALCIUM 9.4 8.2* 8.3* 8.3*     Recent Labs     10/17/22  0655   PROT 7.5   LABALBU 3.8   AST 20   ALT 9   ALKPHOS 132*   BILITOT 0.5   LIPASE 22     ABG:  Lab Results   Component Value Date/Time    POCPH 7.280 10/17/2022 06:52 PM    POCPCO2 38.8 10/17/2022 06:52 PM    POCPO2 354.1 10/17/2022 06:52 PM    POCHCO3 18.2 10/17/2022 06:52 PM    NBEA 8 10/17/2022 06:52 PM    EPOH8XAS 100 10/17/2022 06:52 PM    FIO2 30.0 10/18/2022 05:04 AM     Lab Results   Component Value Date/Time    SPECIAL NOT REPORTED 2017 04:50 PM     Lab Results   Component Value Date/Time    CULTURE NO SIGNIFICANT GROWTH 2017 04:50 PM    CULTURE  2017 04:50 PM     Performed at [facility], [address] [place] [address] [phone]       Radiology:  CT ABDOMEN PELVIS W IV CONTRAST Additional Contrast? None    Result Date: 10/17/2022  1. Interval development of ascites. Overall small volume. 2. In the anterior mid abdomen some loops of small bowel borderline dilated with 2 small gas pockets along the posterior aspect worrisome for extraluminal gas secondary to contained perforation. 3. No bowel obstruction. Findings were discussed with Dr Stanley Terry at 8:18 am on 10/17/2022. XR CHEST PORTABLE    Result Date: 10/17/2022  1. Endotracheal tube in expected position. 2. Central venous catheter terminates in the right atrium. 3. Enteric tube is below the diaphragm, extending beyond the field of view. Physical Examination:     General appearance:  alert, cooperative and no distress  Mental Status:  oriented to person, place and time and normal affect  Lungs:  clear to auscultation bilaterally, normal effort  Heart:  regular rate and rhythm, no murmur  Abdomen:  Surgical wounds appreciated on abdomen; clean and dry   Extremities:  no edema, redness, tenderness in the calves  Skin:  no gross lesions, rashes, induration    Assessment:     Hospital Problems             Last Modified POA    * (Principal) Perforated viscus 10/17/2022 Yes    Bowel perforation ([address] 10/18/2022 Yes    Hypothyroidism (Chronic) 10/18/2022 Yes    Overview Signed 6/5/2016 11:35 AM by [address] [facility]     Replacing Inactive Diagnoses         Chronic atrial fibrillation ([address] (Chronic) 10/18/2022 Yes    Dementia with behavioral disturbance (Chronic) 10/18/2022 Yes    Abdominal pain 10/17/2022 Yes       Plan:      Bowel perforation   -S/P exploratory laparotomy with small bowel resection and abdominal washout 10/17   -Patient pulled NG-tube overnight   -Plan to advance diet to clear liquids today and monitor for return of bowel function   -Continue ciprofloxacin and metronidazole  -Daily CBC   -Daily BMP Chronic atrial fibrillation   -Continue Lovenox 60 mg bid     Jaime Elizondo MD  10/19/2022  8:50 AM

## 2022-10-19 NOTE — PROGRESS NOTES
Occupational Therapy  Facility/Department: [facility] MED SURG ICU  Daily Treatment Note      NAME: Simba Mccoy  : 1932  MRN: [id number]  Date of Service: 10/19/2022    Discharge Recommendations:  Patient would benefit from continued therapy after discharge    OT Equipment Recommendations  Other: Will continue to make appropriate DME / AD recommendations. Patient Diagnosis(es): The primary encounter diagnosis was Abdominal pain, unspecified abdominal location. A diagnosis of Bowel perforation ([address] was also pertinent to this visit. Assessment    Activity Tolerance: Patient limited by fatigue;Patient limited by endurance; Patient tolerated treatment well  Discharge Recommendations: Patient would benefit from continued therapy after discharge  Other: Will continue to make appropriate DME / AD recommendations. Plan   Occupational Therapy Plan  Times Per Week: 5-6x/week  Times Per Day: Once a day  Current Treatment Recommendations: Strengthening;ROM;Balance training;Functional mobility training; Endurance training;Equipment evaluation, education, & procurement;Patient/Caregiver education & training; Safety education & training;Pain management;Self-Care / ADL; Home management training;Cognitive/Perceptual training       Restrictions  Restrictions/Precautions  Restrictions/Precautions: Fall Risk  Required Braces or Orthoses?: No  Position Activity Restriction  Other position/activity restrictions: s/p exploratory laparotomy on 10/18, 2L O2 throughout session, remained on monitor throughout session      Subjective   Subjective  Subjective: RN approved Pt to be seen for OT treatment session. Pt was agreeable / cooperative. Pt reported feeling better today. Pain: Abdominal 2-3/10  Orientation  Overall Orientation Status: Within Functional Limits  Cognition  Overall Cognitive Status: Exceptions  Arousal/Alertness: Delayed responses to stimuli  Following Commands:  Follows one step commands with increased time; Follows one step commands with repetition  Safety Judgement: Decreased awareness of need for assistance;Decreased awareness of need for safety  Problem Solving: Decreased awareness of errors;Assistance required to generate solutions;Assistance required to implement solutions;Assistance required to correct errors made;Assistance required to identify errors made  Insights: Decreased awareness of deficits  Initiation: Requires cues for some  Sequencing: Requires cues for some      Objective    Bed Mobility Training  Bed Mobility Training: No (Pt was sitting upright in recliner chair at start / end of tx session.)    Balance  Sitting: Without support (Fair dynamic sitting balance)  Standing: With support (Fair- dynamic standing balance)  Transfer Training  Transfer Training: Yes  Overall Level of Assistance: Stand-by assistance; Additional time; Adaptive equipment;Contact-guard assistance (CGA progressing to SBA. Using RW.)  Interventions: Verbal cues; Visual cues (Mod Cues task initiation / safe and accurate use of DME)  Sit to Stand: Contact-guard assistance;Stand-by assistance; Additional time; Adaptive equipment  Stand to Sit: Stand-by assistance; Additional time; Adaptive equipment;Contact-guard assistance  Stand Pivot Transfers: Contact-guard assistance; Additional time; Adaptive equipment (Using RW)    Gait / Functional Mobility  Overall Level of Assistance: Contact-guard assistance;Stand-by assistance; Additional time; Adaptive equipment (Functional Mobility for in-room / in-home negotiation with RW. CGA progressing to SBA.)  Interventions: Visual cues; Verbal cues; Safety awareness training (Mod Cues task initiation / safe and accurate use of DME)    ADL  Grooming: Stand by assistance; Increased time to complete;Verbal cueing  Grooming Skilled Clinical Factors: Dynamic sitting in shower chair (at the sink) while completing Facial / Hand Hygiene. UE Bathing: Increased time to complete;Verbal cueing; Moderate assistance  UE Bathing Skilled Clinical Factors: Sitting upright in shower chair (in front of the sink) - completed UB Bathing with Mod Assist (to wash back and  to wash abdomen around dressing). Increased time / Mod Cues for maintenance of body mechanics d/t abdominal wound). LE Bathing Skilled Clinical Factors: Sitting upright in shower chair (in front of the sink) - LB Bathing with Min Assist (to wash / dry feet). Pt required CGA in standing (UE support at the sink) while completing perineal hygiene. UE Dressing: Minimal assistance; Increased time to complete;Verbal cueing  UE Dressing Skilled Clinical Factors: Sitting upright in shower chair, Min Assist to [employer] / [employer] Bow[employer] Assist to don gown. Increased time for task completion. LE Dressing: Minimal assistance; Moderate assistance; Increased time to complete;Verbal cueing  LE Dressing Skilled Clinical Factors: Sitting in shower chair at the sink, Pt required Mod Assist to doff / don Bilateral socks using adapted techniques. Required Min Assist for Brief / Underwear Management. Toileting: Minimal assistance;Contact guard assistance; Increased time to complete  Toileting Skilled Clinical Factors: Required Min Assist for Brief / Underwear Management (initially seated progressing to standing with CGA). Safety Devices  Type of Devices: Gait belt;Left in chair;Nurse notified;Call light within reach  Restraints  Restraints Initially in Place: No     Patient Education  Education Given To: Patient; Family  Education Provided: Plan of Care;Family Education;Equipment;ADL Adaptive Strategies;Transfer Training;Energy Conservation; Fall Prevention Strategies; Role of Therapy;Precautions  Education Provided Comments: UB / LB Bathing and Dressing techniques and safety. Integration of Body mechanics. Integration of EC / Ax pacing.   Education Method: Demonstration;Verbal  Barriers to Learning: None  Education Outcome: Verbalized understanding;Continued education needed;Demonstrated understanding      Goals  Short Term Goals  Time Frame for Short Term Goals: Within 14 treatment sessions  Short Term Goal 1: Pt will demo Mod I and Good Integration of EC / Ax pacing during UB ADLs. Short Term Goal 2: Pt will demo SBA and Fair Integration of AE / DME during LB ADLs / Toileting. Short Term Goal 3: Pt will participate in ADL and Bathroom Transfers with SBA and without LOB. Short Term Goal 4: Pt will maintain Fair Dynamic Standing Balance (8-10 mins) while participating in Leisure tasks. Short Term Goal 5: Pt will complete Functional Mobility (c item retrieval / transport) with Supervision Assist and Good Body Mechanics.        Therapy Time   Individual Concurrent Group Co-treatment   Time In 0673         Time Out 1514         Minutes 55         Timed Code Treatment Minutes: 54 Minutes (ADL)      MIHIR Carrasco, OTR/L

## 2022-10-19 NOTE — PROGRESS NOTES
Physical Therapy  Facility/Department: [facility], [place] ICU  Physical Therapy Daily Treatment Note    Name: Maritza Sen  : 1932  MRN: [id number]  Date of Service: 10/19/2022    Discharge Recommendations:  Patient would benefit from continued therapy after discharge   PT Equipment Recommendations  Equipment Needed: Yes  Mobility Devices: [place]: Rolling  Other: Pt has a rollator and cane at home per patient's daughter. Pt currently would benefit from use of RW at all times based on demonstrated mobility. Patient Diagnosis(es): The primary encounter diagnosis was Abdominal pain, unspecified abdominal location. A diagnosis of Bowel perforation ([address] was also pertinent to this visit. Past Medical History:  has a past medical history of Atrial fibrillation, Back pain, chronic, Dementia ([address] GERD (gastroesophageal reflux disease), Hiatal hernia, History of breast cancer, History of glaucoma, Hypertension, benign essential, goal below 140/90, and Hypothyroid. Past Surgical History:  has a past surgical history that includes Cholecystectomy; Breast surgery; Cataract removal with implant; bladder repair; and laparotomy (N/A, 10/17/2022). Assessment   Body Structures, Functions, Activity Limitations Requiring Skilled Therapeutic Intervention: Decreased functional mobility ; Decreased balance;Decreased strength;Decreased safe awareness;Decreased endurance  Assessment: Pt with noted decreased hina LE strength, decreased endurance and impaired mobility compared to her baseline functional level following exploratory laparotomy. Pt requires mod A for bed mobility and up to min A for gait training with walker. Pt will need 24/7 physical assistance for safe mobility upon discharge and would be unsafe to return to prior living arrangements without such. Pt will benefit from further therapy upon discharge to maximize her safety and independence with mobility.   Therapy Prognosis: Good  Requires PT Follow-Up: Yes  Activity Tolerance  Activity Tolerance: Patient limited by fatigue;Patient limited by endurance     Plan   Physcial Therapy Plan  General Plan:  (5-6x)  Current Treatment Recommendations: Strengthening, Balance training, Functional mobility training, Transfer training, Gait training, Endurance training, Safety education & training, Therapeutic activities, Patient/Caregiver education & training, Stair training, Home exercise program  Safety Devices  Type of Devices: Gait belt, Left in chair, Nurse notified, Call light within reach  Restraints  Restraints Initially in Place: No     Restrictions  Restrictions/Precautions  Restrictions/Precautions: Fall Risk  Required Braces or Orthoses?: No  Position Activity Restriction  Other position/activity restrictions: s/p exploratory laparotomy on 10/18, 2L O2 throughout session, remained on monitor throughout session     Subjective   General  Patient assessed for rehabilitation services?: Yes  Response To Previous Treatment: Patient with no complaints from previous session. Family / Caregiver Present: Yes (three family members)  Referring Practitioner: Dr Alma Rosa Lucia: Within Functional Limits  Subjective  Subjective: Pt supine in bed and agreeable to therapy. Pt reports abdominal discomfort during bed mobility but notes it dissapates with seated rest break. RN agreeable to therapy.            Objective      Bed mobility  Rolling to Left: Minimal assistance  Supine to Sit: Moderate assistance (Increased time, use of log roll technique, HOB raised 30 degrees, increased effort)  Sit to Supine:  (retired to chair at end of session)  Scooting: Minimal assistance  Transfers  Sit to Stand: Contact guard assistance  Stand to Sit: Contact guard assistance  Comment: Cues for hand placement with poor carryover  Ambulation  Surface: Level tile  Device: Rolling Walker  Other Apparatus: O2;Wheelchair follow (2L)  Assistance: Minimal assistance  Quality of Gait: Mild unsteady with three small LOBs requiring min A to correct, requires mostly CGA for balance otherwise then and up to min A at times for walker negotiation  Gait Deviations: Slow [place];Decreased step length;Decreased step height  Distance: 70ft, standing rest break, 70ft  More Ambulation?: No  Stairs/Curb  Stairs?: No     Balance  Posture: Fair  Sitting - Static: Good;-  Sitting - Dynamic: Fair;+  Standing - Static: Fair  Standing - Dynamic: Fair  Comments: standing balance assessed with RW    A/AROM Exercises: Seated exercises: ankle pumps, marches, hip abduction/adduction, LAQs. x15 reps bilaterally        AM-PAC Score  AM-PAC Inpatient Mobility Raw Score : 15 (10/19/22 1211)  AM-PAC Inpatient T-Scale Score : 39.45 (10/19/22 1211)  Mobility Inpatient CMS 0-100% Score: 57.7 (10/19/22 1211)  Mobility Inpatient CMS G-Code Modifier : CK (10/19/22 1211)          Goals  Short Term Goals  Time Frame for Short Term Goals: 14 visits  Short Term Goal 1: Pt to be Independent with bed mobility. Short Term Goal 2: Pt to transfer with Modified (I) with device if needed without LOB. Short Term Goal 3: Pt to ambulate with least restrictive device Modified (I) 100' without LOB. Short Term Goal 4: Pt to perform LE PRE's seated x 20 reps to improve strength for mobility. Patient Goals   Patient Goals : None stated       Education  Patient Education  Education Given To: Patient; Family  Education Provided: Role of Therapy;Plan of Care;Transfer Training  Education Provided Comments: Seated exercises, importance of ambulation, use of RW, importance of assistance with mobility  Education Method: Demonstration;Verbal  Barriers to Learning: None  Education Outcome: Verbalized understanding;Demonstrated understanding;Continued education needed      Therapy Time   Individual Concurrent Group Co-treatment   Time In 1101         Time Out 1139         Minutes 38         Timed Code Treatment Minutes: Marlee Alejo 90, PT

## 2022-10-19 NOTE — CARE COORDINATION
10/19/22 1432   Service Assessment   Patient Orientation Alert and Oriented   Cognition Alert   History Provided By Child/Family   Primary [facility] Children;Family Members   Patient's Healthcare Decision Maker is: Legal Next of Kin   Prior Functional Level Independent in ADLs/IADLs   Current Functional Level Independent in ADLs/IADLs   Can patient return to prior living arrangement Yes   Social/Functional History   Lives With Daughter   Type of [address] Two level; Able to Live on Main level with bedroom/bathroom; Performs ADL's on one level   Home Access Stairs to enter without rails   Entrance Stairs - Number of Steps 2   Bathroom Shower/Tub Walk-in shower   Bathroom Toilet Standard   Bathroom Equipment Grab bars in shower; Shower chair   [address] Needs assistance   Ambulation Assistance Independent   Transfer Assistance Independent   Active  No   Discharge Planning   Type of [address]   Current Services Prior To Admission None   [facility]   Mode of Transport at Discharge Self   Confirm Follow Up Transport Family   Condition of Participation: Discharge Planning   The Patient and/or Patient Representative was provided with a Choice of Provider? Patient   The Patient and/Or Patient Representative agree with the Discharge Plan?  Yes     D/c home with family

## 2022-10-19 NOTE — FLOWSHEET NOTE
Patient ambulated to bathroom using walker pt able to void without issues noted pt missed measuring hat but voided a moderate amount in toilet, urine clear yellow noted denies passing gas through rectum, returned to chair per patient request chair locked patient family at chair side reconnected to cardiac monitor pt given popsicle and has tolerated without issues noted denies nausea

## 2022-10-19 NOTE — PROGRESS NOTES
[facility] General Surgery Progress Note            PATIENT NAME: Alondra Hearing DATE: 10/19/2022, 6:27 AM    Chief Complaint   Patient presents with    Abdominal Pain       SUBJECTIVE:    Pt seen and examined. No acute events overnight, pulled her NGT early this AM, otherwise no new issues. OBJECTIVE:   Vitals:  BP (!) 130/57   Pulse 78   Temp 97.3 °F (36.3 °C) (Temporal)   Resp 25   Ht 4' 11\" (1.499 m)   Wt 121 lb 7.6 oz (55.1 kg)   SpO2 97%   BMI 24.53 kg/m²    TEMPERATURE:  Current - Temp: 97.3 °F (36.3 °C); Max - Temp  Av.8 °F (36.6 °C)  Min: 97.2 °F (36.2 °C)  Max: 98.4 °F (36.9 °C)    INTAKE/OUTPUT:      Intake/Output Summary (Last 24 hours) at 10/19/2022 0067  Last data filed at 10/19/2022 0400  Gross per 24 hour   Intake 1676.44 ml   Output 1930 ml   Net -253.56 ml                 General: resting comfortably, NAD  Lungs: Symmetrical chest rise bilaterally, no audible wheezes or rhonchi  Heart: S1S2  Abdomen: Soft, ND, appropriately tender, incision C/D/I  Extremity: moves all extremities x4, No edema      Data Review:  CBC:   Recent Labs     10/17/22  0655 10/17/22  1855 10/19/22  0523   WBC 5.5 4.7 5.4   HGB 14.3 12.9 10.4*    148 141     BMP:    Recent Labs     10/17/22  1855 10/18/22  0503 10/19/22  0523    135 139   K 4.5 4.4 4.1    105 109*   CO2 16* 17* 23   BUN 14 15 14   CREATININE 0.97* 0.89 0.87   GLUCOSE 109* 105* 84     Hepatic:   Recent Labs     10/17/22  06   AST 20   ALT 9   ALKPHOS 132*   BILITOT 0.5     Coagulation:   Recent Labs     10/17/22  0655   APTT 34.9*   PROT 7.5   INR 2.8             ASSESSMENT     Patient Active Problem List   Diagnosis    Hypertension, benign essential, goal below 140/90    Back pain, chronic    Hearing loss    Obesity (BMI 30.0-34. 9)    Hypothyroidism    Chronic atrial fibrillation (HCC)    History of breast cancer    Dementia with behavioral disturbance    Abdominal pain    BMI 28.0-28.9,adult Hallucination, visual    Closed displaced fracture of middle phalanx of right middle finger    Perforated viscus    Bowel perforation (HCC)   Exploratory laparotomy with small bowel resection and abdominal washout POD 2        PLAN    Monitor without NGT for now, start sips of clear liquids, monitor for bowel function  Ok to DC sharp from GS standpoint  Clean incision BID and cover with bacitracin/dressings  OK to shower if pt desires  Supportive care    Electronically signed by Gustavo Yates, DO on 10/19/2022 at 6:37 AM

## 2022-10-19 NOTE — PROGRESS NOTES
Dr. Josefina Walsh at the bedside for morning rounding, Orders to follow for Dressing change, remove sharp when able, OK to leave NG out at this time as patient has removed it. Assess and monitor bowel functions as diet is advanced.

## 2022-10-20 ENCOUNTER — APPOINTMENT (OUTPATIENT)
Dept: GENERAL RADIOLOGY | Age: 87
DRG: 347 | End: 2022-10-20
Payer: COMMERCIAL

## 2022-10-20 ENCOUNTER — APPOINTMENT (OUTPATIENT)
Dept: CT IMAGING | Age: 87
DRG: 347 | End: 2022-10-20
Payer: COMMERCIAL

## 2022-10-20 LAB
ABSOLUTE EOS #: 0 K/UL (ref 0–0.4)
ABSOLUTE LYMPH #: 1.4 K/UL (ref 1–4.8)
ABSOLUTE MONO #: 0.4 K/UL (ref 0.1–1.2)
ANION GAP SERPL CALCULATED.3IONS-SCNC: 13 MMOL/L (ref 9–17)
BASOPHILS # BLD: 0 % (ref 0–2)
BASOPHILS ABSOLUTE: 0 K/UL (ref 0–0.2)
BUN BLDV-MCNC: 17 MG/DL (ref 8–23)
CALCIUM SERPL-MCNC: 8.5 MG/DL (ref 8.6–10.4)
CHLORIDE BLD-SCNC: 106 MMOL/L (ref 98–107)
CO2: 22 MMOL/L (ref 20–31)
CREAT SERPL-MCNC: 0.76 MG/DL (ref 0.5–0.9)
EOSINOPHILS RELATIVE PERCENT: 0 % (ref 1–4)
GFR SERPL CREATININE-BSD FRML MDRD: >60 ML/MIN/1.73M2
GLUCOSE BLD-MCNC: 108 MG/DL (ref 70–99)
GLUCOSE BLD-MCNC: 132 MG/DL (ref 65–105)
GLUCOSE BLD-MCNC: 93 MG/DL (ref 65–105)
HCT VFR BLD CALC: 30 % (ref 36–46)
HEMOGLOBIN: 10 G/DL (ref 12–16)
LYMPHOCYTES # BLD: 21 % (ref 24–44)
MAGNESIUM: 1.6 MG/DL (ref 1.6–2.6)
MCH RBC QN AUTO: 31.4 PG (ref 26–34)
MCHC RBC AUTO-ENTMCNC: 33.4 G/DL (ref 31–37)
MCV RBC AUTO: 94 FL (ref 80–100)
MONOCYTES # BLD: 6 % (ref 2–11)
PDW BLD-RTO: 13.7 % (ref 12.5–15.4)
PLATELET # BLD: 176 K/UL (ref 140–450)
PMV BLD AUTO: 7.7 FL (ref 6–12)
POTASSIUM SERPL-SCNC: 3 MMOL/L (ref 3.7–5.3)
RBC # BLD: 3.19 M/UL (ref 4–5.2)
SEG NEUTROPHILS: 73 % (ref 36–66)
SEGMENTED NEUTROPHILS ABSOLUTE COUNT: 4.6 K/UL (ref 1.8–7.7)
SODIUM BLD-SCNC: 141 MMOL/L (ref 135–144)
WBC # BLD: 6.4 K/UL (ref 3.5–11)

## 2022-10-20 PROCEDURE — 6360000002 HC RX W HCPCS: Performed by: SURGERY

## 2022-10-20 PROCEDURE — 2500000003 HC RX 250 WO HCPCS: Performed by: STUDENT IN AN ORGANIZED HEALTH CARE EDUCATION/TRAINING PROGRAM

## 2022-10-20 PROCEDURE — 2700000000 HC OXYGEN THERAPY PER DAY

## 2022-10-20 PROCEDURE — 94761 N-INVAS EAR/PLS OXIMETRY MLT: CPT

## 2022-10-20 PROCEDURE — 2580000003 HC RX 258: Performed by: SURGERY

## 2022-10-20 PROCEDURE — 99024 POSTOP FOLLOW-UP VISIT: CPT | Performed by: SURGERY

## 2022-10-20 PROCEDURE — 6360000002 HC RX W HCPCS: Performed by: STUDENT IN AN ORGANIZED HEALTH CARE EDUCATION/TRAINING PROGRAM

## 2022-10-20 PROCEDURE — 2580000003 HC RX 258: Performed by: STUDENT IN AN ORGANIZED HEALTH CARE EDUCATION/TRAINING PROGRAM

## 2022-10-20 PROCEDURE — 6370000000 HC RX 637 (ALT 250 FOR IP): Performed by: SURGERY

## 2022-10-20 PROCEDURE — 74018 RADEX ABDOMEN 1 VIEW: CPT

## 2022-10-20 PROCEDURE — 2500000003 HC RX 250 WO HCPCS: Performed by: SURGERY

## 2022-10-20 PROCEDURE — 80048 BASIC METABOLIC PNL TOTAL CA: CPT

## 2022-10-20 PROCEDURE — 70450 CT HEAD/BRAIN W/O DYE: CPT

## 2022-10-20 PROCEDURE — 36415 COLL VENOUS BLD VENIPUNCTURE: CPT

## 2022-10-20 PROCEDURE — 83735 ASSAY OF MAGNESIUM: CPT

## 2022-10-20 PROCEDURE — 51798 US URINE CAPACITY MEASURE: CPT

## 2022-10-20 PROCEDURE — 74176 CT ABD & PELVIS W/O CONTRAST: CPT

## 2022-10-20 PROCEDURE — 85025 COMPLETE CBC W/AUTO DIFF WBC: CPT

## 2022-10-20 PROCEDURE — 99232 SBSQ HOSP IP/OBS MODERATE 35: CPT | Performed by: STUDENT IN AN ORGANIZED HEALTH CARE EDUCATION/TRAINING PROGRAM

## 2022-10-20 PROCEDURE — 71045 X-RAY EXAM CHEST 1 VIEW: CPT

## 2022-10-20 PROCEDURE — 82947 ASSAY GLUCOSE BLOOD QUANT: CPT

## 2022-10-20 PROCEDURE — 2000000000 HC ICU R&B

## 2022-10-20 RX ORDER — FENTANYL CITRATE 50 UG/ML
25 INJECTION, SOLUTION INTRAMUSCULAR; INTRAVENOUS
Status: DISCONTINUED | OUTPATIENT
Start: 2022-10-20 | End: 2022-10-24 | Stop reason: HOSPADM

## 2022-10-20 RX ORDER — LIDOCAINE HYDROCHLORIDE 10 MG/ML
INJECTION, SOLUTION INFILTRATION; PERINEURAL
Status: DISPENSED
Start: 2022-10-20 | End: 2022-10-20

## 2022-10-20 RX ORDER — LORAZEPAM 2 MG/ML
0.5 INJECTION INTRAMUSCULAR ONCE
Status: COMPLETED | OUTPATIENT
Start: 2022-10-20 | End: 2022-10-20

## 2022-10-20 RX ORDER — FENTANYL CITRATE 50 UG/ML
25 INJECTION, SOLUTION INTRAMUSCULAR; INTRAVENOUS ONCE
Status: COMPLETED | OUTPATIENT
Start: 2022-10-20 | End: 2022-10-20

## 2022-10-20 RX ADMIN — ONDANSETRON 4 MG: 2 INJECTION INTRAMUSCULAR; INTRAVENOUS at 16:47

## 2022-10-20 RX ADMIN — POTASSIUM CHLORIDE 10 MEQ: 10 INJECTION, SOLUTION INTRAVENOUS at 15:24

## 2022-10-20 RX ADMIN — POTASSIUM CHLORIDE 10 MEQ: 10 INJECTION, SOLUTION INTRAVENOUS at 16:43

## 2022-10-20 RX ADMIN — METRONIDAZOLE 500 MG: 500 INJECTION, SOLUTION INTRAVENOUS at 17:46

## 2022-10-20 RX ADMIN — POTASSIUM CHLORIDE 10 MEQ: 10 INJECTION, SOLUTION INTRAVENOUS at 13:45

## 2022-10-20 RX ADMIN — FENTANYL CITRATE 25 MCG: 50 INJECTION, SOLUTION INTRAMUSCULAR; INTRAVENOUS at 16:47

## 2022-10-20 RX ADMIN — METRONIDAZOLE 500 MG: 500 INJECTION, SOLUTION INTRAVENOUS at 11:02

## 2022-10-20 RX ADMIN — POTASSIUM CHLORIDE 40 MEQ: 1500 TABLET, EXTENDED RELEASE ORAL at 17:46

## 2022-10-20 RX ADMIN — CIPROFLOXACIN 400 MG: 2 INJECTION, SOLUTION INTRAVENOUS at 09:50

## 2022-10-20 RX ADMIN — SODIUM CHLORIDE, POTASSIUM CHLORIDE, SODIUM LACTATE AND CALCIUM CHLORIDE: 600; 310; 30; 20 INJECTION, SOLUTION INTRAVENOUS at 01:50

## 2022-10-20 RX ADMIN — LORAZEPAM 0.5 MG: 2 INJECTION INTRAMUSCULAR at 11:00

## 2022-10-20 RX ADMIN — ONDANSETRON 4 MG: 2 INJECTION INTRAMUSCULAR; INTRAVENOUS at 05:36

## 2022-10-20 RX ADMIN — FENTANYL CITRATE 25 MCG: 50 INJECTION, SOLUTION INTRAMUSCULAR; INTRAVENOUS at 08:32

## 2022-10-20 RX ADMIN — POTASSIUM BICARBONATE 40 MEQ: 782 TABLET, EFFERVESCENT ORAL at 18:59

## 2022-10-20 RX ADMIN — WATER 5 MG: 1 INJECTION INTRAMUSCULAR; INTRAVENOUS; SUBCUTANEOUS at 07:39

## 2022-10-20 RX ADMIN — POTASSIUM CHLORIDE 10 MEQ: 10 INJECTION, SOLUTION INTRAVENOUS at 12:11

## 2022-10-20 RX ADMIN — METRONIDAZOLE 500 MG: 500 INJECTION, SOLUTION INTRAVENOUS at 01:58

## 2022-10-20 ASSESSMENT — PAIN DESCRIPTION - LOCATION
LOCATION: ABDOMEN

## 2022-10-20 ASSESSMENT — PAIN SCALES - GENERAL
PAINLEVEL_OUTOF10: 0
PAINLEVEL_OUTOF10: 10
PAINLEVEL_OUTOF10: 0
PAINLEVEL_OUTOF10: 0
PAINLEVEL_OUTOF10: 10

## 2022-10-20 NOTE — PROGRESS NOTES
[facility]  Office: [address], DO, Farhat Ontiveros, DO, Jaqui Montoya, DO, Andrew Resendiz Blood, DO, Carlos Murray MD, Giana Nava MD, Denzel Aldana MD, Brandon Gupta MD,  Julian Iyer MD, Pradeep Melendrez MD, Ara Moore DO, Lisa Powers MD,  Isaias Lema MD, Emanuel Bennett MD, Zoe Brand DO, Gary Chacon MD, Yohan Aguilar MD, Kelin Jain MD, Ainsley Hoang MD, Anna Loera MD, Jose Mcmillan MD, Jone Dowell DO, Jeramie Willis MD, Art Marquez MD, Levi Floyd, CNP,  Nargis Pollock, CNP, José Miguel Tellez, CNP, Sandie Nath, CNP,  Mccoy Baumgarten, [facility], Hugo Guevara, CNP, Hallie Daley, CNP, Kareem Greer, CNP, Gracie Valle, CNP, Shana Mueller, CNP, April Gaffney PA-C, Ismael Verde, CNS, Arnulfo Bae, [facility], Tracey Márquez, CNP, Venia Dandy, CNP, [facility]    Progress Note    10/20/2022    12:23 PM    Name:   Franco Jacome  MRN:     [id number]     Acct:      [de-identified]   Room:   [address] [place] [address] Day:  3  Admit Date:  10/17/2022  6:39 AM    PCP:   Carri Hoff MD  Code Status:  DNR-CCA    Subjective:     C/C:   Chief Complaint   Patient presents with    Abdominal Pain     Interval History Status: worsened. Patient was seen and examined at bedside this AM. She has developed significant confusion and anxiety since yesterday (unclear etiology). She is tachycardic and ill-appearing. The patient has also become hypoxic and is currently on a NRB. Repeat CT scan of the abdomen and pelvis is showing an abdominal wall hematoma. Repeat chest x-ray and CT scan of the head are pending at this time. Brief History:     Patient is a 27-year-old female with a past medical history of chronic atrial fibrillation (on coumadin) who presented to the emergency department on 10/17/2022 complaining of abdominal pain.  CT scan of the abdomen and pelvis was done in the ED and was remarkable for small bowel perforation. General surgery was consulted and took the patient to the OR following admission. The patient is s/p exploratory laparotomy with small bowel resection and abdominal washout. Review of Systems:     Constitutional:  negative for chills, fevers, sweats  Respiratory:  negative for cough, dyspnea on exertion, shortness of breath, wheezing  Cardiovascular:  negative for chest pain, chest pressure/discomfort, lower extremity edema, palpitations  Gastrointestinal:  negative for abdominal pain, constipation, diarrhea, nausea, vomiting  Neurological:  negative for dizziness, headache    Medications: Allergies: Allergies   Allergen Reactions    Latex     Penicillins     Tylenol [Acetaminophen]        Current Meds:   Scheduled Meds:    lidocaine        bacitracin   Topical BID    QUEtiapine  50 mg Oral Once    sodium chloride flush  5-40 mL IntraVENous 2 times per day    [Held by provider] enoxaparin  1 mg/kg SubCUTAneous BID    phytonadione  5 mg Oral Once    tranexamic acid  1,000 mg Topical Once    sodium chloride  80 mL IntraVENous Once    sodium chloride flush  5-40 mL IntraVENous 2 times per day    metroNIDAZOLE  500 mg IntraVENous Q8H    ciprofloxacin  400 mg IntraVENous Q24H     Continuous Infusions:    sodium chloride Stopped (10/18/22 0822)    lactated ringers 100 mL/hr at 10/20/22 0150     PRN Meds: traMADol, sodium chloride flush, sodium chloride flush, sodium chloride, potassium chloride **OR** potassium alternative oral replacement **OR** potassium chloride, magnesium sulfate, ondansetron **OR** ondansetron, morphine **OR** morphine, fentanNYL    Data:     Past Medical History:   has a past medical history of Atrial fibrillation, Back pain, chronic, Dementia ([address] GERD (gastroesophageal reflux disease), Hiatal hernia, History of breast cancer, History of glaucoma, Hypertension, benign essential, goal below 140/90, and Hypothyroid.     Social History:   reports that she has never smoked. She has never used smokeless tobacco. She reports that she does not drink alcohol and does not use drugs. Family History:   Family History   Problem Relation Age of Onset    No Known Problems Mother     No Known Problems Father        Vitals:  /76   Pulse 90   Temp 98.4 °F (36.9 °C) (Temporal)   Resp 16   Ht 4' 11\" (1.499 m)   Wt 121 lb 7.6 oz (55.1 kg)   SpO2 91%   BMI 24.53 kg/m²   Temp (24hrs), Av.4 °F (36.9 °C), Min:98.1 °F (36.7 °C), Max:98.6 °F (37 °C)    Recent Labs     10/19/22  1216 10/19/22  2125 10/20/22  1103   POCGLU 75 91 132*       I/O (24Hr):     Intake/Output Summary (Last 24 hours) at 10/20/2022 1223  Last data filed at 10/20/2022 0545  Gross per 24 hour   Intake 1146.56 ml   Output 170 ml   Net 976.56 ml       Labs:  Hematology:  Recent Labs     10/17/22  1855 10/19/22  0523 10/20/22  0633   WBC 4.7 5.4 6.4   RBC 4.08 3.34* 3.19*   HGB 12.9 10.4* 10.0*   HCT 38.8 31.7* 30.0*   MCV 95.1 94.8 94.0   MCH 31.6 31.2 31.4   MCHC 33.2 33.0 33.4   RDW 14.2 14.3 13.7    141 176   MPV 7.7 7.6 7.7     Chemistry:  Recent Labs     10/18/22  0503 10/19/22  0523 10/20/22  0633    139 141   K 4.4 4.1 3.0*    109* 106   CO2 17* 23 22   GLUCOSE 105* 84 108*   BUN 15 14 17   CREATININE 0.89 0.87 0.76   MG  --   --  1.6   ANIONGAP 13 7* 13   LABGLOM >60 >60 >60   CALCIUM 8.3* 8.3* 8.5*     Recent Labs     10/19/22  1216 10/19/22  2125 10/20/22  1103   POCGLU 75 91 132*     ABG:  Lab Results   Component Value Date/Time    POCPH 7.280 10/17/2022 06:52 PM    POCPCO2 38.8 10/17/2022 06:52 PM    POCPO2 354.1 10/17/2022 06:52 PM    POCHCO3 18.2 10/17/2022 06:52 PM    NBEA 8 10/17/2022 06:52 PM    XQIU8DNQ 100 10/17/2022 06:52 PM    FIO2 30.0 10/18/2022 05:04 AM     Lab Results   Component Value Date/Time    SPECIAL NOT REPORTED 2017 04:50 PM     Lab Results   Component Value Date/Time    CULTURE NO SIGNIFICANT GROWTH 2017 04:50 PM    CULTURE  02/12/2017 04:50 PM     Performed at [facility], [address] [place] [address] [phone]       Radiology:  CT ABDOMEN PELVIS W IV CONTRAST Additional Contrast? None    Result Date: 10/17/2022  1. Interval development of ascites. Overall small volume. 2. In the anterior mid abdomen some loops of small bowel borderline dilated with 2 small gas pockets along the posterior aspect worrisome for extraluminal gas secondary to contained perforation. 3. No bowel obstruction. Findings were discussed with Dr Azeb Amin at 8:18 am on 10/17/2022. XR CHEST PORTABLE    Result Date: 10/17/2022  1. Endotracheal tube in expected position. 2. Central venous catheter terminates in the right atrium. 3. Enteric tube is below the diaphragm, extending beyond the field of view. Physical Examination:     General appearance:  Anxious and confused appearing. Mental Status:  Oriented to person and place. Lungs:  clear to auscultation bilaterally, normal effort  Heart:  regular rate and rhythm, no murmur  Abdomen:  Bleeding surgical wound present on left side of the abdomen. Extremities:  no edema, redness, tenderness in the calves  Skin:  Bleeding surgical wound on left side of abdomen. Assessment:     Hospital Problems             Last Modified POA    * (Principal) Perforated viscus 10/17/2022 Yes    Bowel perforation ([address] 10/18/2022 Yes    Hypothyroidism (Chronic) 10/18/2022 Yes    Overview Signed 6/5/2016 11:35 AM by [facility]     Replacing Inactive Diagnoses         Chronic atrial fibrillation ([address] (Chronic) 10/18/2022 Yes    Dementia with behavioral disturbance (Chronic) 10/18/2022 Yes    Abdominal pain 10/17/2022 Yes       Plan:      Bowel perforation   -S/P exploratory laparotomy with small bowel resection and abdominal washout 10/17   -Repeat CT abdomen and pelvis showing subcutaneous hematoma   -Diet NPO   -Continue ciprofloxacin and metronidazole  -Daily CBC   -Daily BMP Altered mental status   -CXR pending   -CT head pending   -Unlikely secondary to infectious process as the patient has been on ciprofloxacin and metronidazole since admission     Chronic atrial fibrillation   -Holding Lovenox due to abdominal wall hematoma     Earl Abdi MD  10/20/2022  12:23 PM

## 2022-10-20 NOTE — FLOWSHEET NOTE
Resting comfortably at time remains on cardiac monitor and NIBP, placed on oxygen 6 L/nc pt woke with sternal rub and then would go back to sleep airway maintained independently at this time, bed locked and in low position call light within reach and bed alarm on

## 2022-10-20 NOTE — FLOWSHEET NOTE
Patient resting comfortably with family at bedside, remains on cardiac monitor and NIBP, remains on oxygen 2L/nc

## 2022-10-20 NOTE — FLOWSHEET NOTE
Pt returned from CT remains on cardiac monitor , oxygen 4L/nc bed locked and in low position call light within reach.  Pt anxious, family at bedside

## 2022-10-20 NOTE — FLOWSHEET NOTE
Pt placed on on re breather at 15 L at this time for oxygen sats in mid 80s, pt opens eyes to verbal command remains on cardiac monitor and NIBP.

## 2022-10-20 NOTE — FLOWSHEET NOTE
Pt and family educated on need and order for reinstertion of NG tube along with benefits, pt family agreeable 16 fr NG tube placed on first attempt without issues noted to left nare, air bolus auscultated with 10 cc air, NG secured to left nare family at bedside for support, pt remains on cardiac monitor and NIBP pt on oxygen 4L/nc bed locked and in low position call light within reach, KUB ordered to NG placement verification.

## 2022-10-20 NOTE — PROGRESS NOTES
10/19/2022 0832 pt given 25 mcg fentanyl IV per order for abd pain, pt in extreme abd pain, continuing to roll around in bed and kick feet, dressing to midline abd saturated with blood, dressing reinforced with 4x4 dressings and abd secured with tape, prior to transfer to CT pt oxygen sats dropped into the mid 80s on 2L/nc wakes with verbal request, answers questions appropriately, oxygen increased to 6 L/nc, 5779 pt placed on non rebreather at 15L for continued low oxygen sats in mid 80s, pt oxygen levels improved to mid 90s, remains on cardiac monitor, CT complete pt returned to room approx 10/19/2022 @ 0910 and oxygen levels improved into high 90s, pt placed back on nasal cannula 6 L then turned down to 4L/nc and remains on cardiac monitor and NIBP, pt verbalizes improvement of pain family at bedside bed locked and in low position call light within reach.

## 2022-10-20 NOTE — FLOWSHEET NOTE
Dr Aguirre Shirts at bedside, pt very anxious in bed continuing to shuffle feet in bed and spit up small amounts of green/brown colored mucus family at bedside, dressing to abd clean dry and intact.

## 2022-10-20 NOTE — FLOWSHEET NOTE
New dressing placed following sterile technique to IJ to right side after patient vomited on old dressing area cleansed following sterile technique, pt tolerated well denies further needs, bed locked and in low position call light within reach, family remains at bedside, pt tolerating oxygen at 4L/nc

## 2022-10-20 NOTE — FLOWSHEET NOTE
Patient anxious at time, abdomen more distended, pt bladder scanned for 475 noted in bladder, sharp catheter placed following sterile technique 900 ml urine noted in sharp bag after insertion, pt repositioned 2 assist remains on cardiac monitor and NIBP oxygen 4L/nc, bed locked and in low position call light within reach bed alarm on pt family at bedside.  Attending and surgeon notified of above

## 2022-10-20 NOTE — PROGRESS NOTES
Physician Progress Note      PATIENT:               Briseida Hallman  CSN #:                  [id number]  :                       1932  ADMIT DATE:       10/17/2022 6:39 AM  [address] [place] DATE:  RESPONDING  PROVIDER #:        Flex Mcconnell MD          QUERY TEXT:    Pt admitted with Perforated intestine with peritonitis requiring resection of   Jejunum 10/17. Pt noted to have HMG 12.9>10.4. If possible, please document in   the progress notes and discharge summary if you are evaluating and/or   treating any of the following: The medical record reflects the following:  Risk Factors: Age, perforated bowel  Clinical Indicators: HMG 12.9>10.4. 10/17 OP Note The problematic section of   small bowel was transected with 5 cm margins on either side with MELVIN 55 mm   blue load staplers. Harden Beech EBL <50cc. No complications noted. Treatment: Resection bowel, labs, monitor, IV boluses x2 of 500cc 10/17,   Consults Pulmonary/Gen Surgery, NG  Options provided:  -- Acute blood loss anemia  -- Acute on chronic blood loss anemia  -- Postoperative acute blood loss anemia  -- Dilutional anemia  -- Other - I will add my own diagnosis  -- Disagree - Not applicable / Not valid  -- Disagree - Clinically unable to determine / Unknown  -- Refer to Clinical Documentation Reviewer    PROVIDER RESPONSE TEXT:    This patient has postoperative acute blood loss anemia. Query created by:  Dominique Velez on 10/20/2022 6:46 AM      Electronically signed by:  Flex Mcconnell MD 10/20/2022 7:13 AM

## 2022-10-20 NOTE — PROGRESS NOTES
[facility] General Surgery Progress Note            PATIENT NAME: Clemente Garcia     TODAY'S DATE: 10/20/2022, 8:30 AM    Chief Complaint   Patient presents with    Abdominal Pain       SUBJECTIVE:    Pt seen and examined. Anxious this morning. Messaged from nursing staff regarding bloody drainage from inferior aspect of incision. Pt reports \"burning\" in her feet, denies abdominal pain or nausea. OBJECTIVE:   Vitals:  /76   Pulse 90   Temp 98.4 °F (36.9 °C) (Temporal)   Resp 16   Ht 4' 11\" (1.499 m)   Wt 121 lb 7.6 oz (55.1 kg)   SpO2 91%   BMI 24.53 kg/m²    TEMPERATURE:  Current - Temp: 98.4 °F (36.9 °C); Max - Temp  Av.2 °F (36.8 °C)  Min: 97.3 °F (36.3 °C)  Max: 98.6 °F (37 °C)    INTAKE/OUTPUT:      Intake/Output Summary (Last 24 hours) at 10/20/2022 0830  Last data filed at 10/20/2022 0545  Gross per 24 hour   Intake 1146.56 ml   Output 470 ml   Net 676.56 ml                 General: awake, alert  Lungs: Symmetrical chest rise bilaterally, no audible wheezes or rhonchi  Heart: S1S2  Abdomen: Soft, expected distention, inferior aspect of incision shows bleeding from skin site, otherwise no evidence of infection. Extremity: moves all extremities x4, No edema      Data Review:  CBC:   Recent Labs     10/17/22  1855 10/19/22  0523 10/20/22  0633   WBC 4.7 5.4 6.4   HGB 12.9 10.4* 10.0*    141 176     BMP:    Recent Labs     10/18/22  0503 10/19/22  0523 10/20/22  0633    139 141   K 4.4 4.1 3.0*    109* 106   CO2 17* 23 22   BUN 15 14 17   CREATININE 0.89 0.87 0.76   GLUCOSE 105* 84 108*     Hepatic: No results for input(s): AST, ALT, ALB, ALKPHOS, BILITOT, BILIDIR in the last 72 hours. Coagulation: No results for input(s): APTT, PROT, INR in the last 72 hours. ASSESSMENT     Patient Active Problem List   Diagnosis    Hypertension, benign essential, goal below 140/90    Back pain, chronic    Hearing loss    Obesity (BMI 30.0-34. 9)    Hypothyroidism Chronic atrial fibrillation (HCC)    History of breast cancer    Dementia with behavioral disturbance    Abdominal pain    BMI 28.0-28.9,adult    Hallucination, visual    Closed displaced fracture of middle phalanx of right middle finger    Perforated viscus    Bowel perforation (HCC)   Exploratory laparotomy with small bowel resection and abdominal washout POD 3        PLAN    The abdomen was cleansed with betadine and the bleeding skin site was anesthetized with 1% lidocaine 5cc then sutured with interrupted 3-0 nylon with excellent hemostasis. Discussed with Dr. Heather Lopez, will treat anxiety with zyprexa and monitor response  Pt did have some flatus this AM although having some watery emesis, pt did pull her own NGT yesterday and likely not going to tolerate another NGT placement. I discussed the increased risk of aspiration pneumonia with the two daughters at bedside, they are agreeable to leave NGT out for now, emesis may not reflect a resolving ileus as much as exacerbation from her anxiety. Continue NPO.   Supportive care  Electronically signed by Mann Mendoza DO on 10/20/2022 at 8:34 AM

## 2022-10-21 LAB
ABSOLUTE EOS #: 0.1 K/UL (ref 0–0.4)
ABSOLUTE LYMPH #: 1.4 K/UL (ref 1–4.8)
ABSOLUTE MONO #: 0.5 K/UL (ref 0.1–1.2)
ANION GAP SERPL CALCULATED.3IONS-SCNC: 9 MMOL/L (ref 9–17)
BASOPHILS # BLD: 1 % (ref 0–2)
BASOPHILS ABSOLUTE: 0 K/UL (ref 0–0.2)
BUN BLDV-MCNC: 20 MG/DL (ref 8–23)
CALCIUM SERPL-MCNC: 8.2 MG/DL (ref 8.6–10.4)
CHLORIDE BLD-SCNC: 106 MMOL/L (ref 98–107)
CO2: 25 MMOL/L (ref 20–31)
CREAT SERPL-MCNC: 0.76 MG/DL (ref 0.5–0.9)
EOSINOPHILS RELATIVE PERCENT: 2 % (ref 1–4)
GFR SERPL CREATININE-BSD FRML MDRD: >60 ML/MIN/1.73M2
GLUCOSE BLD-MCNC: 83 MG/DL (ref 70–99)
GLUCOSE BLD-MCNC: 91 MG/DL (ref 65–105)
HCT VFR BLD CALC: 24.2 % (ref 36–46)
HEMOGLOBIN: 8.1 G/DL (ref 12–16)
LYMPHOCYTES # BLD: 34 % (ref 24–44)
MCH RBC QN AUTO: 31.4 PG (ref 26–34)
MCHC RBC AUTO-ENTMCNC: 33.5 G/DL (ref 31–37)
MCV RBC AUTO: 93.8 FL (ref 80–100)
MONOCYTES # BLD: 11 % (ref 2–11)
PDW BLD-RTO: 14 % (ref 12.5–15.4)
PLATELET # BLD: 164 K/UL (ref 140–450)
PMV BLD AUTO: 7.6 FL (ref 6–12)
POTASSIUM SERPL-SCNC: 3.9 MMOL/L (ref 3.7–5.3)
RBC # BLD: 2.58 M/UL (ref 4–5.2)
SEG NEUTROPHILS: 52 % (ref 36–66)
SEGMENTED NEUTROPHILS ABSOLUTE COUNT: 2.2 K/UL (ref 1.8–7.7)
SODIUM BLD-SCNC: 140 MMOL/L (ref 135–144)
WBC # BLD: 4.1 K/UL (ref 3.5–11)

## 2022-10-21 PROCEDURE — 6360000002 HC RX W HCPCS: Performed by: SURGERY

## 2022-10-21 PROCEDURE — C9113 INJ PANTOPRAZOLE SODIUM, VIA: HCPCS | Performed by: STUDENT IN AN ORGANIZED HEALTH CARE EDUCATION/TRAINING PROGRAM

## 2022-10-21 PROCEDURE — 99232 SBSQ HOSP IP/OBS MODERATE 35: CPT | Performed by: STUDENT IN AN ORGANIZED HEALTH CARE EDUCATION/TRAINING PROGRAM

## 2022-10-21 PROCEDURE — 94761 N-INVAS EAR/PLS OXIMETRY MLT: CPT

## 2022-10-21 PROCEDURE — 85025 COMPLETE CBC W/AUTO DIFF WBC: CPT

## 2022-10-21 PROCEDURE — 2060000000 HC ICU INTERMEDIATE R&B

## 2022-10-21 PROCEDURE — 82947 ASSAY GLUCOSE BLOOD QUANT: CPT

## 2022-10-21 PROCEDURE — 80048 BASIC METABOLIC PNL TOTAL CA: CPT

## 2022-10-21 PROCEDURE — 6360000002 HC RX W HCPCS: Performed by: STUDENT IN AN ORGANIZED HEALTH CARE EDUCATION/TRAINING PROGRAM

## 2022-10-21 PROCEDURE — 36415 COLL VENOUS BLD VENIPUNCTURE: CPT

## 2022-10-21 PROCEDURE — 99024 POSTOP FOLLOW-UP VISIT: CPT | Performed by: SURGERY

## 2022-10-21 PROCEDURE — 2580000003 HC RX 258: Performed by: STUDENT IN AN ORGANIZED HEALTH CARE EDUCATION/TRAINING PROGRAM

## 2022-10-21 PROCEDURE — 2700000000 HC OXYGEN THERAPY PER DAY

## 2022-10-21 PROCEDURE — 2580000003 HC RX 258: Performed by: ANESTHESIOLOGY

## 2022-10-21 PROCEDURE — 2580000003 HC RX 258: Performed by: SURGERY

## 2022-10-21 PROCEDURE — A4216 STERILE WATER/SALINE, 10 ML: HCPCS | Performed by: STUDENT IN AN ORGANIZED HEALTH CARE EDUCATION/TRAINING PROGRAM

## 2022-10-21 PROCEDURE — 2500000003 HC RX 250 WO HCPCS: Performed by: SURGERY

## 2022-10-21 PROCEDURE — 6370000000 HC RX 637 (ALT 250 FOR IP): Performed by: SURGERY

## 2022-10-21 RX ADMIN — BACITRACIN: 500 OINTMENT TOPICAL at 14:18

## 2022-10-21 RX ADMIN — FENTANYL CITRATE 25 MCG: 50 INJECTION, SOLUTION INTRAMUSCULAR; INTRAVENOUS at 18:08

## 2022-10-21 RX ADMIN — ONDANSETRON 4 MG: 2 INJECTION INTRAMUSCULAR; INTRAVENOUS at 09:28

## 2022-10-21 RX ADMIN — BACITRACIN: 500 OINTMENT TOPICAL at 22:43

## 2022-10-21 RX ADMIN — SODIUM CHLORIDE, POTASSIUM CHLORIDE, SODIUM LACTATE AND CALCIUM CHLORIDE: 600; 310; 30; 20 INJECTION, SOLUTION INTRAVENOUS at 17:25

## 2022-10-21 RX ADMIN — SODIUM CHLORIDE, PRESERVATIVE FREE 10 ML: 5 INJECTION INTRAVENOUS at 09:34

## 2022-10-21 RX ADMIN — FENTANYL CITRATE 25 MCG: 50 INJECTION, SOLUTION INTRAMUSCULAR; INTRAVENOUS at 06:22

## 2022-10-21 RX ADMIN — SODIUM CHLORIDE, POTASSIUM CHLORIDE, SODIUM LACTATE AND CALCIUM CHLORIDE: 600; 310; 30; 20 INJECTION, SOLUTION INTRAVENOUS at 04:11

## 2022-10-21 RX ADMIN — FENTANYL CITRATE 25 MCG: 50 INJECTION, SOLUTION INTRAMUSCULAR; INTRAVENOUS at 09:28

## 2022-10-21 RX ADMIN — SODIUM CHLORIDE, PRESERVATIVE FREE 40 MG: 5 INJECTION INTRAVENOUS at 12:35

## 2022-10-21 RX ADMIN — FENTANYL CITRATE 25 MCG: 50 INJECTION, SOLUTION INTRAMUSCULAR; INTRAVENOUS at 14:19

## 2022-10-21 RX ADMIN — METRONIDAZOLE 500 MG: 500 INJECTION, SOLUTION INTRAVENOUS at 10:40

## 2022-10-21 RX ADMIN — CIPROFLOXACIN 400 MG: 2 INJECTION, SOLUTION INTRAVENOUS at 09:29

## 2022-10-21 RX ADMIN — METRONIDAZOLE 500 MG: 500 INJECTION, SOLUTION INTRAVENOUS at 04:14

## 2022-10-21 RX ADMIN — FENTANYL CITRATE 25 MCG: 50 INJECTION, SOLUTION INTRAMUSCULAR; INTRAVENOUS at 22:54

## 2022-10-21 ASSESSMENT — PAIN DESCRIPTION - DESCRIPTORS
DESCRIPTORS: ACHING
DESCRIPTORS: ACHING

## 2022-10-21 ASSESSMENT — PAIN DESCRIPTION - ORIENTATION
ORIENTATION: MID
ORIENTATION: MID

## 2022-10-21 ASSESSMENT — PAIN DESCRIPTION - ONSET: ONSET: GRADUAL

## 2022-10-21 ASSESSMENT — PAIN DESCRIPTION - FREQUENCY: FREQUENCY: INTERMITTENT

## 2022-10-21 ASSESSMENT — PAIN DESCRIPTION - LOCATION
LOCATION: ABDOMEN

## 2022-10-21 ASSESSMENT — PAIN SCALES - GENERAL
PAINLEVEL_OUTOF10: 4
PAINLEVEL_OUTOF10: 7
PAINLEVEL_OUTOF10: 3
PAINLEVEL_OUTOF10: 7
PAINLEVEL_OUTOF10: 4
PAINLEVEL_OUTOF10: 7
PAINLEVEL_OUTOF10: 6
PAINLEVEL_OUTOF10: 6
PAINLEVEL_OUTOF10: 5

## 2022-10-21 ASSESSMENT — PAIN SCALES - WONG BAKER: WONGBAKER_NUMERICALRESPONSE: 0

## 2022-10-21 ASSESSMENT — PAIN - FUNCTIONAL ASSESSMENT
PAIN_FUNCTIONAL_ASSESSMENT: ACTIVITIES ARE NOT PREVENTED
PAIN_FUNCTIONAL_ASSESSMENT: ACTIVITIES ARE NOT PREVENTED

## 2022-10-21 ASSESSMENT — PAIN DESCRIPTION - PAIN TYPE: TYPE: ACUTE PAIN;SURGICAL PAIN

## 2022-10-21 NOTE — PROGRESS NOTES
Physical Therapy        Physical Therapy Cancel Note      DATE: 10/21/2022    NAME: Nick Jin  MRN: [id number]   : 1932      Patient not seen this date for Physical Therapy due to:    Family Declined: Family declined stating pt was just put back to bed and provided with pain medication. Pt was visibly sleeping comfortably. Pt's family request writer to return back in later PM if able to allow pt to rest as she had already been up with nursing and just returned to bed.  Discussed with VAHID Lopez will have to check back tomorrow, informed family as well to allow pt to rest at their request. Check back 10/22      Electronically signed by Tierney Jones PT on 10/21/2022 at 4:19 PM

## 2022-10-21 NOTE — PROGRESS NOTES
[facility]  Office: [address], DO, Aashish Mcconnell, DO, Macho Lo, DO, Karthikeyan Anshulor Blood, DO, Ramona Rice MD, Susannah Carrera MD, Vineet Molina MD, Rafi Schmitt MD,  Kendra Luis MD, Jalyn Angela MD, Johny Linder, DO, Tresa Ovalle MD,  Selina Harper MD, Gali Frederick MD, Bonnie Ponce, DO, Shantel Cosme MD, Denis Pineda MD, Dangelo Nieto MD, Kristie Timmons MD, Pérez Santillan MD, Jovita Peter MD, Makayla Reyes, DO, Lorraine Padilla MD, Mario Parker MD, Mitzy Bliss, CNP,  Tho Linares, CNP, Malinda Tavera, CNP, Deepti Diggs, CNP,  Bartolo Brumfield, DNP, Janet Colby, CNP, Alan Acosta, CNP, Willow Bedoya, CNP, Jos Lofton, CNP, Owen Conn, CNP, Chana Patel PA-C, Foster Shah, CNS, Aravind Muñoz, DNP, Merna Ewrin, CNP, Becky Galicia, CNP, Miley Muniz [id number]    Progress Note    10/21/2022    8:45 AM    Name:   Chip Ansari  MRN:     [id number]     Acct:      [de-identified]   Room:   [address] [place] [address] Day:  4  Admit Date:  10/17/2022  6:39 AM    PCP:   Mao Quintanilla MD  Code Status:  DNR-CCA    Subjective:     C/C:   Chief Complaint   Patient presents with    Abdominal Pain     Interval History Status: improved. Patient was seen and examined at bedside this AM. Mental status has returned to baseline. The patient reports feeling much better today and is without complaint. NG-tube remains in place. Awaiting return of bowel function. Brief History:     Patient is a 66-year-old female with a past medical history of chronic atrial fibrillation (on coumadin) who presented to the emergency department on 10/17/2022 complaining of abdominal pain. CT scan of the abdomen and pelvis was done in the ED and was remarkable for small bowel perforation. General surgery was consulted and took the patient to the OR following admission.  The patient is s/p exploratory laparotomy with small bowel resection and abdominal washout. Review of Systems:     Constitutional:  negative for chills, fevers, sweats  Respiratory:  negative for cough, dyspnea on exertion, shortness of breath, wheezing  Cardiovascular:  negative for chest pain, chest pressure/discomfort, lower extremity edema, palpitations  Gastrointestinal:  negative for abdominal pain, constipation, diarrhea, nausea, vomiting  Neurological:  negative for dizziness, headache    Medications: Allergies: Allergies   Allergen Reactions    Latex     Penicillins     Tylenol [Acetaminophen]        Current Meds:   Scheduled Meds:    bacitracin   Topical BID    QUEtiapine  50 mg Oral Once    sodium chloride flush  5-40 mL IntraVENous 2 times per day    [Held by provider] enoxaparin  1 mg/kg SubCUTAneous BID    tranexamic acid  1,000 mg Topical Once    sodium chloride  80 mL IntraVENous Once    sodium chloride flush  5-40 mL IntraVENous 2 times per day    metroNIDAZOLE  500 mg IntraVENous Q8H    ciprofloxacin  400 mg IntraVENous Q24H     Continuous Infusions:    sodium chloride Stopped (10/18/22 0822)    lactated ringers 100 mL/hr at 10/21/22 0625     PRN Meds: fentanNYL, traMADol, sodium chloride flush, sodium chloride flush, sodium chloride, potassium chloride **OR** potassium alternative oral replacement **OR** potassium chloride, magnesium sulfate, ondansetron **OR** ondansetron, morphine **OR** morphine    Data:     Past Medical History:   has a past medical history of Atrial fibrillation, Back pain, chronic, Dementia ([address] GERD (gastroesophageal reflux disease), Hiatal hernia, History of breast cancer, History of glaucoma, Hypertension, benign essential, goal below 140/90, and Hypothyroid. Social History:   reports that she has never smoked. She has never used smokeless tobacco. She reports that she does not drink alcohol and does not use drugs.      Family History:   Family History   Problem Relation Age of Onset    No Known Problems Mother     No Known Problems Father        Vitals:  BP (!) 153/56   Pulse 85   Temp 98.5 °F (36.9 °C) (Oral)   Resp 21   Ht 4' 11\" (1.499 m)   Wt 121 lb 7.6 oz (55.1 kg)   SpO2 97%   BMI 24.53 kg/m²   Temp (24hrs), Av.2 °F (36.8 °C), Min:97.8 °F (36.6 °C), Max:98.5 °F (36.9 °C)    Recent Labs     10/19/22  1216 10/19/22  2125 10/20/22  1103 10/20/22  1659   POCGLU 75 91 132* 93       I/O (24Hr):     Intake/Output Summary (Last 24 hours) at 10/21/2022 0845  Last data filed at 10/21/2022 0626  Gross per 24 hour   Intake 3324.72 ml   Output 2920 ml   Net 404.72 ml       Labs:  Hematology:  Recent Labs     10/19/22  0523 10/20/22  0633 10/21/22  0522   WBC 5.4 6.4 4.1   RBC 3.34* 3.19* 2.58*   HGB 10.4* 10.0* 8.1*   HCT 31.7* 30.0* 24.2*   MCV 94.8 94.0 93.8   MCH 31.2 31.4 31.4   MCHC 33.0 33.4 33.5   RDW 14.3 13.7 14.0    176 164   MPV 7.6 7.7 7.6     Chemistry:  Recent Labs     10/19/22  0523 10/20/22  0633 10/21/22  0522    141 140   K 4.1 3.0* 3.9   * 106 106   CO2 23 22 25   GLUCOSE 84 108* 83   BUN 14 17 20   CREATININE 0.87 0.76 0.76   MG  --  1.6  --    ANIONGAP 7* 13 9   LABGLOM >60 >60 >60   CALCIUM 8.3* 8.5* 8.2*     Recent Labs     10/19/22  1216 10/19/22  2125 10/20/22  1103 10/20/22  1659   POCGLU 75 91 132* 93     ABG:  Lab Results   Component Value Date/Time    POCPH 7.280 10/17/2022 06:52 PM    POCPCO2 38.8 10/17/2022 06:52 PM    POCPO2 354.1 10/17/2022 06:52 PM    POCHCO3 18.2 10/17/2022 06:52 PM    NBEA 8 10/17/2022 06:52 PM    QJGZ7EGV 100 10/17/2022 06:52 PM    FIO2 30.0 10/18/2022 05:04 AM     Lab Results   Component Value Date/Time    SPECIAL NOT REPORTED 2017 04:50 PM     Lab Results   Component Value Date/Time    CULTURE NO SIGNIFICANT GROWTH 2017 04:50 PM    CULTURE  2017 04:50 PM     Performed at [address], [address] [place] [address] [phone]       Radiology:  CT ABDOMEN PELVIS W IV CONTRAST -Holding Lovenox due to abdominal wall hematoma   -Will resume tomorrow if hemoglobin remains stable     Deja Moreira MD  10/21/2022  8:45 AM

## 2022-10-21 NOTE — PROGRESS NOTES
Pt reports passing gas and had small bowel movement dr Lillian Rodriguez notified and gave okay to clamp NG at time, NG taken off LIWS and clamped

## 2022-10-21 NOTE — ACP (ADVANCE CARE PLANNING)
..Advance Care Planning     Advance Care Planning Activator (Inpatient)  Conversation Note      Date of ACP Conversation: 10/21/2022     ACP Activator: Rut Edwards RN      Health Care Decision Maker: Fanta Griggs    Current Designated Health Care Decision Maker:     Primary Decision Maker: Maeve Love - Child - [phone]- Requested copy of DPOA for patient's chart      Care Preferences    Ventilation: \"If you were in your present state of health and suddenly became very ill and were unable to breathe on your own, what would your preference be about the use of a ventilator (breathing machine) if it were available to you? \"      Would the patient desire the use of ventilator (breathing machine)?: no    \"If your health worsens and it becomes clear that your chance of recovery is unlikely, what would your preference be about the use of a ventilator (breathing machine) if it were available to you? \"     Would the patient desire the use of ventilator (breathing machine)?: No      Resuscitation  \"CPR works best to restart the heart when there is a sudden event, like a heart attack, in someone who is otherwise healthy. Unfortunately, CPR does not typically restart the heart for people who have serious health conditions or who are very sick. \"    \"In the event your heart stopped as a result of an underlying serious health condition, would you want attempts to be made to restart your heart (answer \"yes\" for attempt to resuscitate) or would you prefer a natural death (answer \"no\" for do not attempt to resuscitate)? \" no       [] Yes   [] No   Educated Patient / Kelly Allen regarding differences between Advance Directives and portable DNR orders.     Length of ACP Conversation in minutes:      Conversation Outcomes:  [] ACP discussion completed  [x] Existing advance directive reviewed with patient; no changes to patient's previously recorded wishes  [] New Advance Directive completed  [] Portable Do Not Rescitate prepared for Provider review and signature  [] POLST/POST/MOLST/MOST prepared for Provider review and signature      Follow-up plan:    [x] Schedule follow-up conversation to continue planning  [] Referred individual to Provider for additional questions/concerns   [] Advised patient/agent/surrogate to review completed ACP document and update if needed with changes in condition, patient preferences or care setting    [] This note routed to one or more involved healthcare providers

## 2022-10-21 NOTE — CONSULTS
..  Palliative Care Inpatient Consult    NAME:  [address] RECORD NUMBER:  [id number]  AGE: 80 y.o. GENDER: female  : 1932  TODAY'S DATE:  10/21/2022    Reasons for Consultation:    Provision of information regarding PC and/or hospice philosophies  Complex, time-intensive communication and interdisciplinary psychosocial support  Clarification of goals of care and/or assistance with difficult decision-making  Guidance in regards to resources and transition(s)    Code Status: [facility]    Members of PC team contributing to this consultation are :  SIMEON [facility], RN    PLAN:  -Talked with daughter Ronnie Sylvester at bedside. Daughter Angelica Cochran is DPOA but not present. Requested copy of DPOA for chart.  -Discussed palliative care services for home after discharge with Ronnie Sylvester. WIll discuss with Angelica Cochran on Monday  -Pt seems to be improving today.   -Will follow up on Monday  -Support offered. History of Present Illness     The patient is a 80 y.o.  /  female who presents with Abdominal Pain    Referred to Palliative Care by   [x] Physician   [] Nursing  [] Family Request   [] Other:       She was admitted to the primary service for Bowel perforation ([facility] [K63.1]  Perforated viscus [R19.8]  Abdominal pain, unspecified abdominal location [R10.9]. Her hospital course has been associated with Perforated viscus.  The patient has a complicated medical history and has been hospitalized since 10/17/2022  6:39 AM.    Active Hospital Problems    Diagnosis Date Noted    Bowel perforation ([facility] [K63.1] 10/18/2022     Priority: Medium    Perforated viscus [R19.8] 10/17/2022     Priority: Medium    Abdominal pain [R10.9] 2017    Dementia with behavioral disturbance [F03.918] 2017    Chronic atrial fibrillation ([facility] [I48.20] 2015    Hypothyroidism [E03.9] 2015       Data        Code Status: DNR-CCA     ADVANCED CARE PLANNING:  Patient has capacity for medical decisions: no  Health Care Power of : yes  Living Will: yes     Personal, Social, and Family History  Marital Status: single  Living situation:with family:  daughter  Danica/Spiritual History:   [place]  Psychological Distress: none noted  Does patient understand diagnosis/treatment? not asked  Does family/caregiver understand diagnosis/treatment? yes    Assessment        Palliative Performance Scale:    ___100% Full ambulation; normal activity and work; no evidence of disease; able to do own self care; normal intake; fully conscious  ___90% Full ambulation; normal activity and work; some evidence of disease; able to do own self care; normal intake; fully conscious  ___80% Full ambulation; normal activity with effort; some evidence of disease; able to do own self care; normal or reduced intake; fully conscious  ___70% Ambulation reduced; unable to perform normal job/work; significant disease; able to do own self care; normal or reduced intake; fully conscious  ___60%  Ambulation reduced; cannot do hobbies/housework; significant disease; occasional assist; intake normal or reduced; fully conscious/some confusion  ___50%  Mainly sit/lie; can't do any work; extensive disease; considerable assist; intake normal or reduced; fully conscious/some confusion  __x_40%  Mainly in bed; extensive disease; mainly assist; intake normal or reduced; fully conscious/ some confusion   ___30%  Bed bound; extensive disease; total care; intake reduced; fully conscious/some confusion  ___20%  Bed bound; extensive disease; total care; intake minimal; drowsy/coma  ___10%  Bed bound; extensive disease; total care; mouth care only; drowsy/coma  ___0       Death       Risk Assessments:    Tonia Risk Score: [unfilled]    Readmission Risk Score: 11.3        1 Year Mortality Risk Score: @1YEARMORTALITYRISK@     Plan        Palliative Interaction: Pt resting in bed, sleeping. I did not wake her. Pt's daughter Simone Jones is at bedside.  Simone Jones states she lives out of state and her sister [facility]. I request a copy to be brought in for her records. We discuss about patient's illness and events since hospitalization. Lawyer Montano states patient was mostly independent with bathing, eating and even doing laundry. Pt lives with daughter Zach Mora. There are 4 adult Grandchildren that live in the home also. They all look after her throughout the day. She states the plan is to take her back home as they have enough help to care for her. Family does not want patient to go to a SNF. We did discuss code status briefly and Lawyer Montano affirms patient's [facility] wishes. I explained what palliative care is and how it can help. Pt will most likely be going home with home care services and we can also add palliative services if needed. Lawyer Montano seems open to the idea but wants me to speak to Zach Mora about it. She states Zach Mora will be in this afternoon. I told her I had to leave but will contact Zach Mora on Monday about it. Emotional support offered. Will follow up on Monday. Education/support to family  Discharge planning/helping to coordinate care  Communications with primary service  Providing support for coping/adaptation/distress of family  Discussing meaning/purpose   Continue with current plan of care  Code status clarified: [facility]  Validating patient/family distress  Continued communication updates    Principle Problem/Diagnosis:  Bowel perforation ([facility] [K63.1]  Perforated viscus [R19.8]  Abdominal pain, unspecified abdominal location [R10.9]    Goals of care evaluation:  The patient goals of care are improve or maintain function/quality of life   Goals of care discussed with:    [] Patient independently    [] Patient and Family    [x] Family or Healthcare DPOA independently    [] Unable to discuss with patient, family/DPOA not present    Code Status  DNR-CCA    Other recommendations:  Please call with any palliative questions or concerns.   Palliative Care Team is available via perfect serve or via phone - [phone]. Palliative Care will continue to follow Ms. Rodrigues's care as needed. Thank you for allowing Palliative Care to participate in the care of Ms. Rodrigues .     Electronically signed by   Miguel An RN  Palliative Care Team  on 10/21/2022 at 1:07 PM

## 2022-10-21 NOTE — FLOWSHEET NOTE
Pt able to take a few steps from the bedside commode to the bed with assist x 2 of RN and family member pt had small dark brown bowel movement, denies nausea but has c/o abd pain

## 2022-10-21 NOTE — PROGRESS NOTES
[facility] General Surgery Progress Note            PATIENT NAME: Eduardo Gomes     TODAY'S DATE: 10/21/2022, 7:01 AM    Chief Complaint   Patient presents with    Abdominal Pain       SUBJECTIVE:    Pt seen and examined. NGT and Woodall were placed yesterday, pt appears more comfortable this AM. CT abd/pelv was obtained, no fascial dehiscence, findings consistent with expected postoperative changes for this patient. OBJECTIVE:   Vitals:  BP (!) 153/56   Pulse 85   Temp 97.8 °F (36.6 °C) (Oral)   Resp 21   Ht 4' 11\" (1.499 m)   Wt 121 lb 7.6 oz (55.1 kg)   SpO2 97%   BMI 24.53 kg/m²    TEMPERATURE:  Current - Temp: 97.8 °F (36.6 °C); Max - Temp  Av.8 °F (36.6 °C)  Min: 97.8 °F (36.6 °C)  Max: 97.8 °F (36.6 °C)    INTAKE/OUTPUT:      Intake/Output Summary (Last 24 hours) at 10/21/2022 0701  Last data filed at 10/21/2022 6650  Gross per 24 hour   Intake 3324.72 ml   Output 2920 ml   Net 404.72 ml                 General: awake, alert, NAD  Lungs: Symmetrical chest rise bilaterally, no audible wheezes or rhonchi  Heart: S1S2  Abdomen: Soft, appropriately tender, incision intact, SS drainage on dressings, expected  of the skin ecchymosis, no evidence of active bleeding or infection  Extremity: moves all extremities x4, No edema      Data Review:  CBC:   Recent Labs     10/19/22  0523 10/20/22  0633 10/21/22  0522   WBC 5.4 6.4 4.1   HGB 10.4* 10.0* 8.1*    176 164     BMP:    Recent Labs     10/19/22  0523 10/20/22  0633 10/21/22  0522    141 140   K 4.1 3.0* 3.9   * 106 106   CO2 23 22 25   BUN 14 17 20   CREATININE 0.87 0.76 0.76   GLUCOSE 84 108* 83     Hepatic: No results for input(s): AST, ALT, ALB, ALKPHOS, BILITOT, BILIDIR in the last 72 hours. Coagulation: No results for input(s): APTT, PROT, INR in the last 72 hours.           ASSESSMENT     Patient Active Problem List   Diagnosis    Hypertension, benign essential, goal below 140/90    Back pain, chronic    Hearing loss Obesity (BMI 30.0-34. 9)    Hypothyroidism    Chronic atrial fibrillation (HCC)    History of breast cancer    Dementia with behavioral disturbance    Abdominal pain    BMI 28.0-28.9,adult    Hallucination, visual    Closed displaced fracture of middle phalanx of right middle finger    Perforated viscus    Bowel perforation ([facility]     Exploratory laparotomy with small bowel resection and abdominal washout POD 4    PLAN    NGT to LIWS, ok to clamp NGT for PT/OT if needed up to 30min at a time, clamp for oral meds, strict intake/outputs, maintain NPO otherwise  Woodall care  Continue to monitor for bowel function  Pathology discussed with the patient's daughter, unclear etiology for focal small bowel infection/abscess, planned antibiotic course to end today  Supportive care      Electronically signed by Chuy Adams DO on 10/21/2022 at 7:04 AM

## 2022-10-22 LAB
ABSOLUTE EOS #: 0 K/UL (ref 0–0.4)
ABSOLUTE LYMPH #: 1.5 K/UL (ref 1–4.8)
ABSOLUTE MONO #: 0.7 K/UL (ref 0.1–1.2)
ANION GAP SERPL CALCULATED.3IONS-SCNC: 13 MMOL/L (ref 9–17)
BASOPHILS # BLD: 0 % (ref 0–2)
BASOPHILS ABSOLUTE: 0 K/UL (ref 0–0.2)
BUN BLDV-MCNC: 16 MG/DL (ref 8–23)
CALCIUM SERPL-MCNC: 7.9 MG/DL (ref 8.6–10.4)
CHLORIDE BLD-SCNC: 104 MMOL/L (ref 98–107)
CO2: 23 MMOL/L (ref 20–31)
CREAT SERPL-MCNC: 0.67 MG/DL (ref 0.5–0.9)
D-DIMER QUANTITATIVE: 1.76 MG/L FEU
EOSINOPHILS RELATIVE PERCENT: 1 % (ref 1–4)
GFR SERPL CREATININE-BSD FRML MDRD: >60 ML/MIN/1.73M2
GLUCOSE BLD-MCNC: 93 MG/DL (ref 70–99)
HCT VFR BLD CALC: 20.8 % (ref 36–46)
HCT VFR BLD CALC: 22.2 % (ref 36–46)
HEMOGLOBIN: 6.7 G/DL (ref 12–16)
HEMOGLOBIN: 7.6 G/DL (ref 12–16)
INR BLD: 4.5
INR BLD: 4.8
LYMPHOCYTES # BLD: 26 % (ref 24–44)
MAGNESIUM: 1.5 MG/DL (ref 1.6–2.6)
MAGNESIUM: 2.1 MG/DL (ref 1.6–2.6)
MCH RBC QN AUTO: 32.1 PG (ref 26–34)
MCHC RBC AUTO-ENTMCNC: 34.2 G/DL (ref 31–37)
MCV RBC AUTO: 93.9 FL (ref 80–100)
MONOCYTES # BLD: 13 % (ref 2–11)
PARTIAL THROMBOPLASTIN TIME: 40.7 SEC (ref 21.3–31.3)
PARTIAL THROMBOPLASTIN TIME: 47.5 SEC (ref 21.3–31.3)
PDW BLD-RTO: 13.5 % (ref 12.5–15.4)
PLATELET # BLD: 166 K/UL (ref 140–450)
PMV BLD AUTO: 7.4 FL (ref 6–12)
POTASSIUM SERPL-SCNC: 3.4 MMOL/L (ref 3.7–5.3)
POTASSIUM SERPL-SCNC: 4.1 MMOL/L (ref 3.7–5.3)
PROTHROMBIN TIME: 41.6 SEC (ref 9.4–12.6)
PROTHROMBIN TIME: 44.9 SEC (ref 9.4–12.6)
RBC # BLD: 2.37 M/UL (ref 4–5.2)
SEG NEUTROPHILS: 60 % (ref 36–66)
SEGMENTED NEUTROPHILS ABSOLUTE COUNT: 3.4 K/UL (ref 1.8–7.7)
SODIUM BLD-SCNC: 140 MMOL/L (ref 135–144)
WBC # BLD: 5.7 K/UL (ref 3.5–11)

## 2022-10-22 PROCEDURE — 80048 BASIC METABOLIC PNL TOTAL CA: CPT

## 2022-10-22 PROCEDURE — 84132 ASSAY OF SERUM POTASSIUM: CPT

## 2022-10-22 PROCEDURE — 2580000003 HC RX 258: Performed by: ANESTHESIOLOGY

## 2022-10-22 PROCEDURE — 85379 FIBRIN DEGRADATION QUANT: CPT

## 2022-10-22 PROCEDURE — 85610 PROTHROMBIN TIME: CPT

## 2022-10-22 PROCEDURE — 6360000002 HC RX W HCPCS: Performed by: SURGERY

## 2022-10-22 PROCEDURE — 85018 HEMOGLOBIN: CPT

## 2022-10-22 PROCEDURE — 85025 COMPLETE CBC W/AUTO DIFF WBC: CPT

## 2022-10-22 PROCEDURE — C9113 INJ PANTOPRAZOLE SODIUM, VIA: HCPCS | Performed by: STUDENT IN AN ORGANIZED HEALTH CARE EDUCATION/TRAINING PROGRAM

## 2022-10-22 PROCEDURE — 2580000003 HC RX 258: Performed by: SURGERY

## 2022-10-22 PROCEDURE — 36415 COLL VENOUS BLD VENIPUNCTURE: CPT

## 2022-10-22 PROCEDURE — 83735 ASSAY OF MAGNESIUM: CPT

## 2022-10-22 PROCEDURE — 6360000002 HC RX W HCPCS: Performed by: STUDENT IN AN ORGANIZED HEALTH CARE EDUCATION/TRAINING PROGRAM

## 2022-10-22 PROCEDURE — 97535 SELF CARE MNGMENT TRAINING: CPT

## 2022-10-22 PROCEDURE — 97116 GAIT TRAINING THERAPY: CPT

## 2022-10-22 PROCEDURE — 2700000000 HC OXYGEN THERAPY PER DAY

## 2022-10-22 PROCEDURE — 99232 SBSQ HOSP IP/OBS MODERATE 35: CPT | Performed by: STUDENT IN AN ORGANIZED HEALTH CARE EDUCATION/TRAINING PROGRAM

## 2022-10-22 PROCEDURE — 2060000000 HC ICU INTERMEDIATE R&B

## 2022-10-22 PROCEDURE — 85730 THROMBOPLASTIN TIME PARTIAL: CPT

## 2022-10-22 PROCEDURE — 6360000002 HC RX W HCPCS: Performed by: NURSE PRACTITIONER

## 2022-10-22 PROCEDURE — 2580000003 HC RX 258: Performed by: STUDENT IN AN ORGANIZED HEALTH CARE EDUCATION/TRAINING PROGRAM

## 2022-10-22 PROCEDURE — 94761 N-INVAS EAR/PLS OXIMETRY MLT: CPT

## 2022-10-22 PROCEDURE — 6370000000 HC RX 637 (ALT 250 FOR IP): Performed by: STUDENT IN AN ORGANIZED HEALTH CARE EDUCATION/TRAINING PROGRAM

## 2022-10-22 PROCEDURE — 85014 HEMATOCRIT: CPT

## 2022-10-22 RX ORDER — LORAZEPAM 2 MG/ML
0.5 INJECTION INTRAMUSCULAR ONCE
Status: COMPLETED | OUTPATIENT
Start: 2022-10-22 | End: 2022-10-22

## 2022-10-22 RX ORDER — 0.9 % SODIUM CHLORIDE 0.9 %
500 INTRAVENOUS SOLUTION INTRAVENOUS ONCE
Status: COMPLETED | OUTPATIENT
Start: 2022-10-22 | End: 2022-10-22

## 2022-10-22 RX ORDER — 0.9 % SODIUM CHLORIDE 0.9 %
1000 INTRAVENOUS SOLUTION INTRAVENOUS ONCE
Status: DISCONTINUED | OUTPATIENT
Start: 2022-10-22 | End: 2022-10-24 | Stop reason: HOSPADM

## 2022-10-22 RX ORDER — SCOLOPAMINE TRANSDERMAL SYSTEM 1 MG/1
1 PATCH, EXTENDED RELEASE TRANSDERMAL
Status: DISCONTINUED | OUTPATIENT
Start: 2022-10-22 | End: 2022-10-24 | Stop reason: HOSPADM

## 2022-10-22 RX ADMIN — ONDANSETRON 4 MG: 2 INJECTION INTRAMUSCULAR; INTRAVENOUS at 09:53

## 2022-10-22 RX ADMIN — SODIUM CHLORIDE, POTASSIUM CHLORIDE, SODIUM LACTATE AND CALCIUM CHLORIDE: 600; 310; 30; 20 INJECTION, SOLUTION INTRAVENOUS at 22:44

## 2022-10-22 RX ADMIN — IRON SUCROSE 300 MG: 20 INJECTION, SOLUTION INTRAVENOUS at 17:05

## 2022-10-22 RX ADMIN — PHYTONADIONE 10 MG: 10 INJECTION, EMULSION INTRAMUSCULAR; INTRAVENOUS; SUBCUTANEOUS at 15:53

## 2022-10-22 RX ADMIN — SODIUM CHLORIDE, PRESERVATIVE FREE 10 ML: 5 INJECTION INTRAVENOUS at 09:54

## 2022-10-22 RX ADMIN — MAGNESIUM SULFATE HEPTAHYDRATE 1000 MG: 1 INJECTION, SOLUTION INTRAVENOUS at 09:56

## 2022-10-22 RX ADMIN — ONDANSETRON 4 MG: 2 INJECTION INTRAMUSCULAR; INTRAVENOUS at 12:32

## 2022-10-22 RX ADMIN — ONDANSETRON 4 MG: 2 INJECTION INTRAMUSCULAR; INTRAVENOUS at 18:07

## 2022-10-22 RX ADMIN — MORPHINE SULFATE 2 MG: 2 INJECTION, SOLUTION INTRAMUSCULAR; INTRAVENOUS at 14:03

## 2022-10-22 RX ADMIN — BACITRACIN: 500 OINTMENT TOPICAL at 09:53

## 2022-10-22 RX ADMIN — PHENOL 1 SPRAY: 1.5 LIQUID ORAL at 15:52

## 2022-10-22 RX ADMIN — SODIUM CHLORIDE 500 ML: 0.9 INJECTION, SOLUTION INTRAVENOUS at 04:45

## 2022-10-22 RX ADMIN — PHENOL 1 SPRAY: 1.5 LIQUID ORAL at 12:32

## 2022-10-22 RX ADMIN — POTASSIUM CHLORIDE 10 MEQ: 10 INJECTION, SOLUTION INTRAVENOUS at 11:54

## 2022-10-22 RX ADMIN — MAGNESIUM SULFATE HEPTAHYDRATE 1000 MG: 1 INJECTION, SOLUTION INTRAVENOUS at 11:55

## 2022-10-22 RX ADMIN — SODIUM CHLORIDE, PRESERVATIVE FREE 40 MG: 5 INJECTION INTRAVENOUS at 09:53

## 2022-10-22 RX ADMIN — FENTANYL CITRATE 25 MCG: 50 INJECTION, SOLUTION INTRAMUSCULAR; INTRAVENOUS at 01:28

## 2022-10-22 RX ADMIN — POTASSIUM CHLORIDE 10 MEQ: 10 INJECTION, SOLUTION INTRAVENOUS at 10:07

## 2022-10-22 RX ADMIN — LORAZEPAM 0.5 MG: 2 INJECTION INTRAMUSCULAR; INTRAVENOUS at 03:15

## 2022-10-22 RX ADMIN — POTASSIUM CHLORIDE 10 MEQ: 10 INJECTION, SOLUTION INTRAVENOUS at 13:09

## 2022-10-22 RX ADMIN — SODIUM CHLORIDE, POTASSIUM CHLORIDE, SODIUM LACTATE AND CALCIUM CHLORIDE: 600; 310; 30; 20 INJECTION, SOLUTION INTRAVENOUS at 03:06

## 2022-10-22 RX ADMIN — ONDANSETRON 4 MG: 2 INJECTION INTRAMUSCULAR; INTRAVENOUS at 23:44

## 2022-10-22 RX ADMIN — POTASSIUM CHLORIDE 10 MEQ: 10 INJECTION, SOLUTION INTRAVENOUS at 10:53

## 2022-10-22 ASSESSMENT — PAIN DESCRIPTION - ORIENTATION: ORIENTATION: LEFT

## 2022-10-22 ASSESSMENT — PAIN SCALES - GENERAL
PAINLEVEL_OUTOF10: 7
PAINLEVEL_OUTOF10: 3
PAINLEVEL_OUTOF10: 3
PAINLEVEL_OUTOF10: 7
PAINLEVEL_OUTOF10: 7
PAINLEVEL_OUTOF10: 9
PAINLEVEL_OUTOF10: 2

## 2022-10-22 ASSESSMENT — PAIN DESCRIPTION - FREQUENCY: FREQUENCY: CONTINUOUS

## 2022-10-22 ASSESSMENT — PAIN DESCRIPTION - LOCATION
LOCATION: HEAD;LEG
LOCATION: ABDOMEN

## 2022-10-22 ASSESSMENT — PAIN - FUNCTIONAL ASSESSMENT: PAIN_FUNCTIONAL_ASSESSMENT: ACTIVITIES ARE NOT PREVENTED

## 2022-10-22 ASSESSMENT — PAIN DESCRIPTION - DESCRIPTORS
DESCRIPTORS: ACHING
DESCRIPTORS: ACHING

## 2022-10-22 ASSESSMENT — PAIN DESCRIPTION - ONSET: ONSET: ON-GOING

## 2022-10-22 ASSESSMENT — PAIN DESCRIPTION - PAIN TYPE: TYPE: CHRONIC PAIN

## 2022-10-22 NOTE — PROGRESS NOTES
Updated Dr. Evan Reynaga on low blood pressure @ 0401, pt also had another moderate tarry black stool, some mottling noted on back of right leg, pt squirming all over bed, difficulty holding still. NG to LIWS, no output. Orders for IV fluids and labwork received. Continued to update Dr. Evan Reynaga and [facility] NP as to pt's status. Daughters at bedside. They inquired if ok to have their brother come in. Writer advised ok.

## 2022-10-22 NOTE — PROGRESS NOTES
[facility]  Office: [address], DO, Deannapalak Edwards, DO, Elisabet Suero, DO, Jean-Claudeaugustin Huntshane Blood, DO, Shawn Steinberg MD, Sai Bunch MD, Nhan Souza MD, Jaylyn Chavez MD,  All Das MD, Amy Bryant MD, Sameera Stevens, DO, Graciela Diaz MD,  Krystle Brady MD, Leticia Spear MD, Jhon Paul DO, Shira Vargas MD, Soo Gaspar MD, Dre Gutierrez MD, Jr Reynolds MD, Josias Turpin MD, Kiesha Bennett MD, Thor Otoole DO, Jose Skaggs MD, Rekha Kahn MD, Alejandro Ellis, [facility],  Ever Nuñez, CNP, Roxy Ferguson, CNP, Perley Gilford, CNP,  Leigh Cochran, [facility], Dipika Salgado, CNP, Yolanda Almaguer, CNP, Nicole Momin, CNP, Philomena Ruiz, CNP, Lawyer Taylor, CNP, Katja Hu PA-C, Song Borja, CNS, Rodrigo Carney, [facility], Cletis Hashimoto, CNP, Neha Silva, CNP, Miley Kamara [address]    Progress Note    10/22/2022    9:39 AM    Name:   Kirti Lopez  MRN:     [id number]     Acct:      [de-identified]   Room:   [address] [place] [address] Day:  5  Admit Date:  10/17/2022  6:39 AM    PCP:   Elana Goldstein MD  Code Status:  DNR-CCA    Subjective:     C/C:   Chief Complaint   Patient presents with    Abdominal Pain     Interval History Status: not changed. Patient was seen and examined at bedside this AM. She was agitated overnight and became hypotensive following administration of lorazepam. The patient was noted to have slight bleeding around her surgical wounds and also had a small melanotic stool overnight. Plan to clamp NG-tube and advance diet today. Family is very concerned about the patient's agitation and feels that she is \"withdrawing\" from her mirtazapine. I explained to multiple family members that the patient is experiencing hospital delirium and that this is very common in elderly patients. Recommend avoiding scheduled benzodiazepines or antipsychotics at this time. Brief History:     Patient is a 77-year-old female with a past medical history of chronic atrial fibrillation (on coumadin) who presented to the emergency department on 10/17/2022 complaining of abdominal pain. CT scan of the abdomen and pelvis was done in the ED and was remarkable for small bowel perforation. General surgery was consulted and took the patient to the OR following admission. The patient is s/p exploratory laparotomy with small bowel resection and abdominal washout. Review of Systems:     Constitutional:  negative for chills, fevers, sweats  Respiratory:  negative for cough, dyspnea on exertion, shortness of breath, wheezing  Cardiovascular:  negative for chest pain, chest pressure/discomfort, lower extremity edema, palpitations  Gastrointestinal:  negative for abdominal pain, constipation, diarrhea, nausea, vomiting  Neurological:  negative for dizziness, headache    Medications: Allergies:     Allergies   Allergen Reactions    Latex     Penicillins     Tylenol [Acetaminophen]        Current Meds:   Scheduled Meds:    sodium chloride  1,000 mL IntraVENous Once    pantoprazole (PROTONIX) 40 mg injection  40 mg IntraVENous Daily    bacitracin   Topical BID    QUEtiapine  50 mg Oral Once    sodium chloride flush  5-40 mL IntraVENous 2 times per day    [Held by provider] enoxaparin  1 mg/kg SubCUTAneous BID    tranexamic acid  1,000 mg Topical Once    sodium chloride  80 mL IntraVENous Once    sodium chloride flush  5-40 mL IntraVENous 2 times per day     Continuous Infusions:    sodium chloride Stopped (10/18/22 0822)    lactated ringers 100 mL/hr at 10/22/22 0646     PRN Meds: fentanNYL, traMADol, sodium chloride flush, sodium chloride flush, sodium chloride, potassium chloride **OR** potassium alternative oral replacement **OR** potassium chloride, magnesium sulfate, ondansetron **OR** ondansetron, morphine **OR** morphine    Data:     Past Medical History:   has a past medical history of Atrial fibrillation, Back pain, chronic, Dementia ([address] GERD (gastroesophageal reflux disease), Hiatal hernia, History of breast cancer, History of glaucoma, Hypertension, benign essential, goal below 140/90, and Hypothyroid. Social History:   reports that she has never smoked. She has never used smokeless tobacco. She reports that she does not drink alcohol and does not use drugs. Family History:   Family History   Problem Relation Age of Onset    No Known Problems Mother     No Known Problems Father        Vitals:  BP (!) 140/72   Pulse 91   Temp 98.4 °F (36.9 °C) (Oral)   Resp 25   Ht 4' 11\" (1.499 m)   Wt 121 lb 0.5 oz (54.9 kg)   SpO2 100%   BMI 24.45 kg/m²   Temp (24hrs), Av.8 °F (37.1 °C), Min:98.4 °F (36.9 °C), Max:99 °F (37.2 °C)    Recent Labs     10/19/22  2125 10/20/22  1103 10/20/22  1659 10/21/22  1114   POCGLU 91 132* 93 91       I/O (24Hr):     Intake/Output Summary (Last 24 hours) at 10/22/2022 0939  Last data filed at 10/22/2022 0640  Gross per 24 hour   Intake 25 ml   Output 1100 ml   Net -1075 ml       Labs:  Hematology:  Recent Labs     10/20/22  0633 10/21/22  0522 10/22/22  0424   WBC 6.4 4.1 5.7   RBC 3.19* 2.58* 2.37*   HGB 10.0* 8.1* 7.6*   HCT 30.0* 24.2* 22.2*   MCV 94.0 93.8 93.9   MCH 31.4 31.4 32.1   MCHC 33.4 33.5 34.2   RDW 13.7 14.0 13.5    164 166   MPV 7.7 7.6 7.4   INR  --   --  4.5   DDIMER  --   --  1.76     Chemistry:  Recent Labs     10/20/22  0633 10/21/22  0522 10/22/22  0424    140 140   K 3.0* 3.9 3.4*    106 104   CO2 22 25 23   GLUCOSE 108* 83 93   BUN 17 20 16   CREATININE 0.76 0.76 0.67   MG 1.6  --  1.5*   ANIONGAP 13 9 13   LABGLOM >60 >60 >60   CALCIUM 8.5* 8.2* 7.9*     Recent Labs     10/19/22  1216 10/19/22  2125 10/20/22  1103 10/20/22  1659 10/21/22  1114   POCGLU 75 91 132* 93 91     ABG:  Lab Results   Component Value Date/Time    POCPH 7.280 10/17/2022 06:52 PM    POCPCO2 38.8 10/17/2022 06:52 PM    POCPO2 354.1 10/17/2022 06:52 PM    POCHCO3 18.2 10/17/2022 06:52 PM    NBEA 8 10/17/2022 06:52 PM    XWDT3CEW 100 10/17/2022 06:52 PM    FIO2 30.0 10/18/2022 05:04 AM     Lab Results   Component Value Date/Time    SPECIAL NOT REPORTED 02/12/2017 04:50 PM     Lab Results   Component Value Date/Time    CULTURE NO SIGNIFICANT GROWTH 02/12/2017 04:50 PM    CULTURE  02/12/2017 04:50 PM     Performed at [facility], [address] [place] [address] [phone]       Radiology:  CT ABDOMEN PELVIS W IV CONTRAST Additional Contrast? None    Result Date: 10/17/2022  1. Interval development of ascites. Overall small volume. 2. In the anterior mid abdomen some loops of small bowel borderline dilated with 2 small gas pockets along the posterior aspect worrisome for extraluminal gas secondary to contained perforation. 3. No bowel obstruction. Findings were discussed with Dr Cliff Green at 8:18 am on 10/17/2022. XR CHEST PORTABLE    Result Date: 10/17/2022  1. Endotracheal tube in expected position. 2. Central venous catheter terminates in the right atrium. 3. Enteric tube is below the diaphragm, extending beyond the field of view. Physical Examination:     General appearance:  Drowsy and disoriented. Mental Status:  Patient is disoriented this morning. Lungs:  clear to auscultation bilaterally, normal effort  Heart:  regular rate and rhythm, no murmur  Abdomen:  Abdominal binder in place. Extremities:  no edema, redness, tenderness in the calves  Skin:  Surgical wounds clean and dry.      Assessment:     Hospital Problems             Last Modified POA    * (Principal) Perforated viscus 10/17/2022 Yes    Bowel perforation ([address] 10/18/2022 Yes    Hypothyroidism (Chronic) 10/18/2022 Yes    Overview Signed 6/5/2016 11:35 AM by [facility]     Replacing Inactive Diagnoses         Chronic atrial fibrillation ([address] (Chronic) 10/18/2022 Yes    Dementia with behavioral disturbance (Chronic) 10/18/2022 Yes    Abdominal pain 10/17/2022 Yes       Plan:      Bowel perforation   -S/P exploratory laparotomy with small bowel resection and abdominal washout 10/17   -Repeat CT abdomen and pelvis showing subcutaneous hematoma   -Plan to clamp NG-tube and advance diet to clears today   -Daily CBC   -Daily BMP     Melena   -Patient was noted to have a melanotic stool overnight   -Likely secondary to oozing from the anastomosis staple line   -Continue to monitor hemoglobin     Hypotension   -Secondary to lorazepam administration   -Avoid using benzodiazepines for agitation (recommend Zyprexa for severe agitation)     Chronic atrial fibrillation   -Holding Lovenox due to abdominal wall hematoma   -Will resume tomorrow if hemoglobin remains stable     Mayur Cazares MD  10/22/2022  9:39 AM

## 2022-10-22 NOTE — PLAN OF CARE
Problem: Pain  Goal: Verbalizes/displays adequate comfort level or baseline comfort level  10/22/2022 1749 by Christ Lafleur RN  Outcome: Progressing  10/22/2022 0625 by Noemi White RN  Outcome: Progressing     Problem: Neurosensory - Adult  Goal: Achieves maximal functionality and self care  10/22/2022 1749 by Christ Lafleur RN  Outcome: Progressing  10/22/2022 0625 by Noemi White RN  Outcome: Not Progressing   Pain scale preformed per protocol and pt treated for pain as documented. Education given. Problem: Safety - Adult  Goal: Free from fall injury  10/22/2022 1749 by Christ Lafleur RN  Outcome: Progressing  10/22/2022 0625 by Noemi White RN  Outcome: Progressing   Fall assessment preformed. Bed in low locked position with call light and tray table within reach. Education given. Will continue to monitor. Problem: Respiratory - Adult  Goal: Achieves optimal ventilation and oxygenation  10/22/2022 1749 by Christ Lafleur RN  Outcome: Progressing  10/22/2022 0625 by Noemi White RN  Outcome: Progressing   Coughing deep breathing encouraged. supplemental O2 used as ordered. Respiratory therapist at bedside for education.

## 2022-10-22 NOTE — PROGRESS NOTES
Occupational Therapy  Facility/Department: [facility]  Occupational Therapy Daily Treatment Note    Name: Pham Mcclure  : 1932  MRN: [id number]  Date of Service: 10/22/2022    Discharge Recommendations:  Patient would benefit from continued therapy after discharge        Patient Diagnosis(es): The primary encounter diagnosis was Abdominal pain, unspecified abdominal location. A diagnosis of Bowel perforation ([facility] was also pertinent to this visit. Past Medical History:  has a past medical history of Atrial fibrillation, Back pain, chronic, Dementia ([address] GERD (gastroesophageal reflux disease), Hiatal hernia, History of breast cancer, History of glaucoma, Hypertension, benign essential, goal below 140/90, and Hypothyroid. Past Surgical History:  has a past surgical history that includes Cholecystectomy; Breast surgery; Cataract removal with implant; bladder repair; and laparotomy (N/A, 10/17/2022). Assessment   Performance deficits / Impairments: Decreased functional mobility ; Decreased endurance;Decreased coordination;Decreased ADL status; Decreased balance;Decreased strength;Decreased vision/visual deficit; Decreased safe awareness;Decreased high-level IADLs;Decreased cognition  Assessment: Pt requiring high levels of physical assistance during engagement in all functional tasks and with limited activity tolerance at this time. Continued therapy services recommended while hospitalized to maximize pt's safety and independence in performing functional tasks. Pt requires strict 24hr supervision/assistance and continued skilled therapy intervention at discharge. Prognosis: Fair  Decision Making: Medium Complexity  Activity Tolerance  Activity Tolerance: Patient limited by fatigue;Treatment limited secondary to decreased cognition;Patient limited by pain      Safety Devices  Type of Devices: Call light within reach; Left in chair;Nurse notified; Chair alarm in place  Restraints  Restraints Initially in Place: No    Plan   Occupational Therapy Plan  Times Per Week: 5-6x/wk  Times Per Day: Once a day  Current Treatment Recommendations: Strengthening, ROM, Balance training, Functional mobility training, Endurance training, Equipment evaluation, education, & procurement, Patient/Caregiver education & training, Safety education & training, Pain management, Self-Care / ADL, Home management training, Cognitive/Perceptual training     Restrictions  Restrictions/Precautions  Restrictions/Precautions: Fall Risk  Required Braces or Orthoses?: No  Position Activity Restriction  Other position/activity restrictions: s/p exploratory laparotomy on 10/18, 2L O2 throughout session, remained on monitor throughout session    Subjective   General  Patient assessed for rehabilitation services?: Yes  Family / Caregiver Present: Yes (two dtrs present throughout- very involved and supportive throughout session)  General Comment  Comments: RN ok'd for therapy this AM. Pt up to EOB with PT at therapist arrival; pt agreeable to therapy and pleasant throughout. Objective  Balance  Sitting:  (CGA (seated at [facility] ~8-10 minutes, seated at edge of recliner chair ~2-3 minutes))  Standing:  (Min A-mod A throughout; ~30-45 second bouts of standing during pericare/bottom hygiene and brief mngt)    Functional Mobility   Pt performed ~2-3 steps bed > BSC > chair with mod A and use of RW. Pt significantly unsteady with BLE buckling and posterior lean. Overall Level of Assistance: Moderate assistance  Interventions: Verbal cues; Safety awareness training (proper hand placement/sequencing/safety awareness, RW mngt/navigation)    Toilet Transfers  Toilet - Technique: Stand step (with use of RW)  Equipment Used: Standard bedside commode  Toilet Transfer:  Moderate assistance       ADL  Toileting: Increased time to complete;Maximum assistance (mod A for transfer to/from [facility] using RW, max A for LB clothing mngt as pt unable to stand without BUE support, max A for bottom hygiene)    Transfers  Sit to stand: Moderate assistance (performed 4x during toileting tasks as pt with limited standing tolerance and requiring frequent rest breaks to complete LB clothing mngt/pericare/bottom hygiene)  Stand to sit: Moderate assistance  Transfer Comments: Mod verbal/tactile cues for proper hand placement/sequencing/safety awareness with use of RW during functional transfers. Increased time/effort to perform. Cognition  Overall Cognitive Status: Exceptions  Arousal/Alertness: Delayed responses to stimuli  Following Commands: Follows one step commands with increased time; Follows one step commands with repetition  Attention Span: Attends with cues to redirect  Safety Judgement: Decreased awareness of need for assistance;Decreased awareness of need for safety  Problem Solving: Decreased awareness of errors;Assistance required to generate solutions;Assistance required to implement solutions;Assistance required to correct errors made;Assistance required to identify errors made  Insights: Decreased awareness of deficits  Initiation: Requires cues for some  Sequencing: Requires cues for some  Cognition Comment: Pt impulsive; increased agitation at times. Orientation  Overall Orientation Status: Within Functional Limits        Education Given To: Patient; Family  Education Provided: Role of Therapy;Plan of Care;Precautions;Transfer Training;Equipment (Activity Promotion, Safety with Transfers/RW Mngt, Importance of Rest Breaks PRN)  Education Provided Comments: UB / LB Bathing and Dressing techniques and safety. Integration of Body mechanics. Integration of EC / Ax pacing.   Education Method: Demonstration;Verbal  Barriers to Learning: None  Education Outcome: Verbalized understanding;Continued education needed       AM-PAC Score   AM-PAC Inpatient Daily Activity Raw Score: 15 (10/22/22 1650)  AM-PAC Inpatient ADL T-Scale Score : 34.69 (10/22/22 1650)  ADL Inpatient CMS 0-100% Score: 56.46 (10/22/22 1650)  ADL Inpatient CMS G-Code Modifier : CK (10/22/22 1650)      Goals  Short Term Goals  Time Frame for Short Term Goals: Within 14 treatment sessions  Short Term Goal 1: Pt will demo Mod I and Good Integration of EC / Ax pacing during UB ADLs. Short Term Goal 2: Pt will demo SBA and Fair Integration of AE / DME during LB ADLs / Toileting. Short Term Goal 3: Pt will participate in ADL and Bathroom Transfers with SBA and without LOB. Short Term Goal 4: Pt will maintain Fair Dynamic Standing Balance (8-10 mins) while participating in Leisure tasks. Short Term Goal 5: Pt will complete Functional Mobility (c item retrieval / transport) with Supervision Assist and Good Body Mechanics.        Therapy Time   Individual Concurrent Group Co-treatment   Time In 0930         Time Out 0953         Minutes 23         Timed Code Treatment Minutes: [address], OTR/L

## 2022-10-22 NOTE — PROGRESS NOTES
Updated Dr. David Lopez on low blood pressure @ 0401, pt also had another moderate tarry black stool, some mottling noted on back of right leg, pt squirming all over bed, difficulty holding still. Orders for IV fluids and labwork received. Continued to update Dr. David Lopez and [facility] NP as to pt's status. Daughters at bedside. They inquired if ok to have their brother come in. Writer advised ok.

## 2022-10-22 NOTE — PROGRESS NOTES
Patient c/o abdominal pain, sitting up @ almost 90 degree angle in bed, coughing hard with kidney basin held to mouth. 2 family members at bedside. Lowered patient's head of bed. Pain medication administered and writer found abdominal dressing saturated with blood thru fluffs and ABD pads. Dressing removed and steady trickle of gene red blood noted between some lower staples. New dressing applied. Educated patient on need to splint belly if need to cough and try not to strain hard when having bowel movements. Patient c/o of back of throat discomfort from NG tube. Again, educated on need for and purpose of the NG tube. Patient verbally acknowledged she understood. Dr. Figueroa Book notified via text via [employer].

## 2022-10-22 NOTE — PLAN OF CARE
Problem: Pain  Goal: Verbalizes/displays adequate comfort level or baseline comfort level  Outcome: Progressing   No new signs/symptoms of pain noted, pain rating < 5 on scale 0-10, pain controlled with medication/repositioning   Problem: Safety - Adult  Goal: Free from fall injury  Outcome: Progressing   No falls/injuries this shift, bed in lowest position, brakes on, bed alarm on, call light in reach, side rails up x2   Problem: ABCDS Injury Assessment  Goal: Absence of physical injury  Outcome: Progressing   No new signs/symptoms of pain noted, pain rating < 3 on scale 0-10, pain controlled with medication/repositioning   Problem: Respiratory - Adult  Goal: Achieves optimal ventilation and oxygenation  Outcome: Progressing   O2 sats remain >92% on 2L/NC   Problem: Neurosensory - Adult  Goal: Achieves maximal functionality and self care  Outcome: Not Progressing   Minimal progression noted

## 2022-10-22 NOTE — PROGRESS NOTES
Physical Therapy  Facility/Department: [facility] SURG ICU  Physical Therapy Daily Progress Note    Name: Laurence Lynn  : 1932  MRN: [id number]  Date of Service: 10/22/2022    Discharge Recommendations:  Patient would benefit from continued therapy after discharge   PT Equipment Recommendations  Equipment Needed: Yes  Walker: Rolling  Other: Pt has a rollator and cane at home per patient's daughter. Pt currently would benefit from use of RW at all times based on demonstrated mobility. Patient Diagnosis(es): The primary encounter diagnosis was Abdominal pain, unspecified abdominal location. A diagnosis of Bowel perforation ([address] was also pertinent to this visit. Past Medical History:  has a past medical history of Atrial fibrillation, Back pain, chronic, Dementia ([address] GERD (gastroesophageal reflux disease), Hiatal hernia, History of breast cancer, History of glaucoma, Hypertension, benign essential, goal below 140/90, and Hypothyroid. Past Surgical History:  has a past surgical history that includes Cholecystectomy; Breast surgery; Cataract removal with implant; bladder repair; and laparotomy (N/A, 10/17/2022). Assessment   Body Structures, Functions, Activity Limitations Requiring Skilled Therapeutic Intervention: Decreased functional mobility ; Decreased balance;Decreased strength;Decreased safe awareness;Decreased endurance  Assessment: Pt with noted decreased hina LE strength, decreased endurance and impaired mobility compared to her baseline functional level following exploratory laparotomy. Pt requires min-mod A for bed mobility and up to min A-mod assist for transfers. Pt will need 24/7 physical assistance for safe mobility upon discharge and would be unsafe to return to prior living arrangements without such. Pt will benefit from further therapy upon discharge to maximize her safety and independence with mobility.   Treatment Diagnosis: decreased mobility  Therapy Prognosis: Good  Activity Tolerance  Activity Tolerance: Patient limited by fatigue;Patient limited by endurance     Plan   Physcial Therapy Plan  General Plan:  (5-6x/week)  Current Treatment Recommendations: Strengthening, Balance training, Functional mobility training, Transfer training, Gait training, Endurance training, Safety education & training, Therapeutic activities, Patient/Caregiver education & training, Stair training, Home exercise program  Safety Devices  Type of Devices: Gait belt, Left in chair, Nurse notified, Call light within reach  Restraints  Restraints Initially in Place: No     Restrictions  Restrictions/Precautions  Restrictions/Precautions: Fall Risk  Required Braces or Orthoses?: No  Position Activity Restriction  Other position/activity restrictions: s/p exploratory laparotomy on 10/18, 2L O2 throughout session, remained on monitor throughout session     Subjective   General  Response To Previous Treatment: Patient with no complaints from previous session. Family / Caregiver Present: Yes  Follows Commands: Within Functional Limits  Subjective  Subjective: Pt supine in bed with nursing request to assist getting pt off bedpan and cleaned up; while in room pt requested to get up to chair. Pt with c/o abdominal pain. Cognition   Orientation  Overall Orientation Status: Within Functional Limits  Cognition  Overall Cognitive Status: Exceptions  Arousal/Alertness: Delayed responses to stimuli  Following Commands: Follows one step commands with increased time; Follows one step commands with repetition  Attention Span: Attends with cues to redirect  Safety Judgement: Decreased awareness of need for assistance;Decreased awareness of need for safety  Problem Solving: Decreased awareness of errors;Assistance required to generate solutions;Assistance required to implement solutions;Assistance required to correct errors made;Assistance required to identify errors made  Insights: Decreased awareness of deficits  Initiation: Requires cues for some  Sequencing: Requires cues for some  Cognition Comment: Pt impulsive; increased agitation at times. Objective           Bed mobility  Rolling to Left: Minimal assistance  Rolling to Right: Minimal assistance  Supine to Sit: Moderate assistance;Minimal assistance  Scooting: Minimal assistance  Bed Mobility Comments: Head of bed slightly elevated and use of rail and therapists hand; increased time and effort; pt retired to chair at end of session. Transfers  Sit to Stand: Moderate Assistance  Stand to Sit: Minimal Assistance  Stand Pivot Transfers: Moderate Assistance;Minimal Assistance;2 Person Assistance  Comment: Transfers with RW. Pt transferred from bed > commode with HHA-Min assist x 2; stood with RW x 3 reps for clean up and brief change with poor standing tolerance; transferred from commode > recliner with RW Min-Mod assist x 2. Ambulation  Surface: PM: Pt ambulated with RW 30' x 1 with Min assist x 2; slow pace, short step length and height; frequent verbal cues for posture and to keep walker closer. Balance  Posture: Fair  Sitting - Static: Good;-  Sitting - Dynamic: Fair;+  Standing - Static: Fair;-  Standing - Dynamic: Fair;-           OutComes Score                                                  AM-PAC Score  AM-PAC Inpatient Mobility Raw Score : 14 (10/18/22 1531)  AM-PAC Inpatient T-Scale Score : 38.1 (10/18/22 1531)  Mobility Inpatient CMS 0-100% Score: 61.29 (10/18/22 1531)  Mobility Inpatient CMS G-Code Modifier : CL (10/18/22 1531)            Goals  Short Term Goals  Time Frame for Short Term Goals: 14 visits  Short Term Goal 1: Pt to be Independent with bed mobility. Short Term Goal 2: Pt to transfer with Modified (I) with device if needed without LOB. Short Term Goal 3: Pt to ambulate with least restrictive device Modified (I) 100' without LOB.   Short Term Goal 4: Pt to perform LE PRE's seated x 20 reps to improve strength for mobility.   Patient Goals   Patient Goals : None stated       Education         Therapy Time   Individual Concurrent Group Co-treatment   Time In [id number]         Time Out [id number]         Minutes 28      11         Timed Code Treatment Minutes: [address] [facility],

## 2022-10-22 NOTE — PROGRESS NOTES
PATIENT NAME: Kirti Lopez     TODAY'S DATE: 10/22/2022, 6:41 AM    SUBJECTIVE:    79 y/o female POD 7 with hypotension and low 02 sats this am.  Was reported by nurse that this event happened after administration of ativan for agitation. Patient is also noted to have melena. NG has been clamped and patient denies nausea or vomiting. Bleeding noted from midline incision. OBJECTIVE:   VITALS:  BP (!) 152/87   Pulse (!) 105   Temp 98.4 °F (36.9 °C) (Oral)   Resp 27   Ht 4' 11\" (1.499 m)   Wt 121 lb 0.5 oz (54.9 kg)   SpO2 100%   BMI 24.45 kg/m²      INTAKE/OUTPUT:      Intake/Output Summary (Last 24 hours) at 10/22/2022 0641  Last data filed at 10/22/2022 0640  Gross per 24 hour   Intake 25 ml   Output 1100 ml   Net -1075 ml                 CONSTITUTIONAL:  awake, Awake and somnolent. No acute distress  ABDOMEN:   Abdomen soft, non-tender, non-distended. No active bleeding noted from midline incision. Data:  CBC:   Lab Results   Component Value Date/Time    WBC 5.7 10/22/2022 04:24 AM    RBC 2.37 10/22/2022 04:24 AM    RBC 4.49 01/05/2012 07:37 AM    HGB 7.6 10/22/2022 04:24 AM    HCT 22.2 10/22/2022 04:24 AM    MCV 93.9 10/22/2022 04:24 AM    MCH 32.1 10/22/2022 04:24 AM    MCHC 34.2 10/22/2022 04:24 AM    RDW 13.5 10/22/2022 04:24 AM     10/22/2022 04:24 AM     01/05/2012 07:37 AM    MPV 7.4 10/22/2022 04:24 AM     PT/INR:    Lab Results   Component Value Date/Time    PROTIME 41.6 10/22/2022 04:24 AM    PROTIME 31 10/14/2022 03:42 PM    PROTIME 33.4 03/10/2014 06:51 AM    INR 4.5 10/22/2022 04:24 AM     PTT:    Lab Results   Component Value Date/Time    APTT 40.7 10/22/2022 04:24 AM   [APTT}    ASSESSMENT   POD 7 ex lap small bowel resection    Plan  Patient is hemodynamically stable. There is no active bleeding from incision. Patient is not acutely hemorrhaging. Patient's INR 4.5 which accounts for surgical site hematoma.   Melena is likely from oozing at anastomosis staple line. Will follow with serial H/H and abdominal exams. Patient does not have an acute surgical abdomen. Had lengthy discussion with family. Family is concerned with agitation. Patient has agitation and dementia from hospital stay. Patient is not getting adequate rest which leads to episodic agitation. I explained to family the dangers of scheduled medications because they can lead to hypotension and respiratory distress and patient just experienced tonight. Patient's family is very frustrated about multiple family members present during all hours of the day to help care for patient. Patient having melena. Will continue to monitor. If H/H is stable, anticipate melena should resolve. Patient is having bowel movements. Will clamp tube and start ice chips. If tolerates, can pull ng and start clears this afternoon.       Electronically signed by Avi Evans IV, DO  on 10/22/2022 at 6:41 AM

## 2022-10-23 LAB
ABSOLUTE EOS #: 0.06 K/UL (ref 0–0.4)
ABSOLUTE LYMPH #: 1.24 K/UL (ref 1–4.8)
ABSOLUTE MONO #: 0.53 K/UL (ref 0.1–0.8)
ANION GAP SERPL CALCULATED.3IONS-SCNC: 8 MMOL/L (ref 9–17)
BASOPHILS # BLD: 1 % (ref 0–2)
BASOPHILS ABSOLUTE: 0.06 K/UL (ref 0–0.2)
BUN BLDV-MCNC: 10 MG/DL (ref 8–23)
CALCIUM SERPL-MCNC: 7.9 MG/DL (ref 8.6–10.4)
CHLORIDE BLD-SCNC: 108 MMOL/L (ref 98–107)
CO2: 24 MMOL/L (ref 20–31)
CREAT SERPL-MCNC: 0.66 MG/DL (ref 0.5–0.9)
EOSINOPHILS RELATIVE PERCENT: 1 % (ref 1–4)
GFR SERPL CREATININE-BSD FRML MDRD: >60 ML/MIN/1.73M2
GLUCOSE BLD-MCNC: 85 MG/DL (ref 70–99)
HCT VFR BLD CALC: 20.5 % (ref 36–46)
HEMOGLOBIN: 6.6 G/DL (ref 12–16)
INR BLD: 1.3
LYMPHOCYTES # BLD: 21 % (ref 24–44)
MCH RBC QN AUTO: 31.3 PG (ref 26–34)
MCHC RBC AUTO-ENTMCNC: 32.2 G/DL (ref 31–37)
MCV RBC AUTO: 97.2 FL (ref 80–100)
METAMYELOCYTES ABSOLUTE COUNT: 0.3 K/UL
METAMYELOCYTES: 5 %
MONOCYTES # BLD: 9 % (ref 1–7)
MORPHOLOGY: ABNORMAL
PDW BLD-RTO: 13.3 % (ref 12.5–15.4)
PLATELET # BLD: 150 K/UL (ref 140–450)
PMV BLD AUTO: 9 FL (ref 8–14)
POTASSIUM SERPL-SCNC: 3.8 MMOL/L (ref 3.7–5.3)
PROTHROMBIN TIME: 13.3 SEC (ref 9.4–12.6)
RBC # BLD: 2.11 M/UL (ref 4–5.2)
SEG NEUTROPHILS: 63 % (ref 36–66)
SEGMENTED NEUTROPHILS ABSOLUTE COUNT: 3.71 K/UL (ref 1.8–7.7)
SODIUM BLD-SCNC: 140 MMOL/L (ref 135–144)
WBC # BLD: 5.9 K/UL (ref 3.5–11)

## 2022-10-23 PROCEDURE — 99232 SBSQ HOSP IP/OBS MODERATE 35: CPT | Performed by: STUDENT IN AN ORGANIZED HEALTH CARE EDUCATION/TRAINING PROGRAM

## 2022-10-23 PROCEDURE — 2580000003 HC RX 258: Performed by: ANESTHESIOLOGY

## 2022-10-23 PROCEDURE — 2060000000 HC ICU INTERMEDIATE R&B

## 2022-10-23 PROCEDURE — 36415 COLL VENOUS BLD VENIPUNCTURE: CPT

## 2022-10-23 PROCEDURE — 2700000000 HC OXYGEN THERAPY PER DAY

## 2022-10-23 PROCEDURE — 85610 PROTHROMBIN TIME: CPT

## 2022-10-23 PROCEDURE — 2580000003 HC RX 258: Performed by: SURGERY

## 2022-10-23 PROCEDURE — 6360000002 HC RX W HCPCS: Performed by: STUDENT IN AN ORGANIZED HEALTH CARE EDUCATION/TRAINING PROGRAM

## 2022-10-23 PROCEDURE — 2580000003 HC RX 258: Performed by: STUDENT IN AN ORGANIZED HEALTH CARE EDUCATION/TRAINING PROGRAM

## 2022-10-23 PROCEDURE — 6360000002 HC RX W HCPCS: Performed by: SURGERY

## 2022-10-23 PROCEDURE — 85025 COMPLETE CBC W/AUTO DIFF WBC: CPT

## 2022-10-23 PROCEDURE — C9113 INJ PANTOPRAZOLE SODIUM, VIA: HCPCS | Performed by: STUDENT IN AN ORGANIZED HEALTH CARE EDUCATION/TRAINING PROGRAM

## 2022-10-23 PROCEDURE — 94760 N-INVAS EAR/PLS OXIMETRY 1: CPT

## 2022-10-23 PROCEDURE — 80048 BASIC METABOLIC PNL TOTAL CA: CPT

## 2022-10-23 RX ADMIN — SODIUM CHLORIDE, PRESERVATIVE FREE 10 ML: 5 INJECTION INTRAVENOUS at 08:38

## 2022-10-23 RX ADMIN — MORPHINE SULFATE 2 MG: 2 INJECTION, SOLUTION INTRAMUSCULAR; INTRAVENOUS at 03:06

## 2022-10-23 RX ADMIN — ONDANSETRON 4 MG: 2 INJECTION INTRAMUSCULAR; INTRAVENOUS at 14:49

## 2022-10-23 RX ADMIN — ONDANSETRON 4 MG: 2 INJECTION INTRAMUSCULAR; INTRAVENOUS at 08:38

## 2022-10-23 RX ADMIN — SODIUM CHLORIDE, PRESERVATIVE FREE 10 ML: 5 INJECTION INTRAVENOUS at 08:39

## 2022-10-23 RX ADMIN — MORPHINE SULFATE 2 MG: 2 INJECTION, SOLUTION INTRAMUSCULAR; INTRAVENOUS at 14:49

## 2022-10-23 RX ADMIN — MORPHINE SULFATE 2 MG: 2 INJECTION, SOLUTION INTRAMUSCULAR; INTRAVENOUS at 21:12

## 2022-10-23 RX ADMIN — SODIUM CHLORIDE, PRESERVATIVE FREE 40 MG: 5 INJECTION INTRAVENOUS at 08:38

## 2022-10-23 RX ADMIN — BACITRACIN: 500 OINTMENT TOPICAL at 08:38

## 2022-10-23 ASSESSMENT — PAIN SCALES - GENERAL
PAINLEVEL_OUTOF10: 0
PAINLEVEL_OUTOF10: 3
PAINLEVEL_OUTOF10: 6

## 2022-10-23 ASSESSMENT — PAIN SCALES - PAIN ASSESSMENT IN ADVANCED DEMENTIA (PAINAD)
FACIALEXPRESSION: 0
BODYLANGUAGE: 0
TOTALSCORE: 0
CONSOLABILITY: 0
BREATHING: 0
NEGVOCALIZATION: 0

## 2022-10-23 ASSESSMENT — PAIN DESCRIPTION - FREQUENCY: FREQUENCY: CONTINUOUS

## 2022-10-23 ASSESSMENT — PAIN DESCRIPTION - ONSET: ONSET: ON-GOING

## 2022-10-23 ASSESSMENT — PAIN DESCRIPTION - DESCRIPTORS: DESCRIPTORS: ACHING

## 2022-10-23 ASSESSMENT — PAIN DESCRIPTION - PAIN TYPE: TYPE: ACUTE PAIN

## 2022-10-23 ASSESSMENT — PAIN DESCRIPTION - LOCATION: LOCATION: HEAD

## 2022-10-23 ASSESSMENT — PAIN DESCRIPTION - ORIENTATION: ORIENTATION: LEFT

## 2022-10-23 ASSESSMENT — PAIN - FUNCTIONAL ASSESSMENT: PAIN_FUNCTIONAL_ASSESSMENT: ACTIVITIES ARE NOT PREVENTED

## 2022-10-23 ASSESSMENT — PAIN SCALES - WONG BAKER: WONGBAKER_NUMERICALRESPONSE: 0

## 2022-10-23 NOTE — PLAN OF CARE
Problem: Respiratory - Adult  Goal: Achieves optimal ventilation and oxygenation  10/22/2022 2159 by Damon Powell RCP  Flowsheets (Taken 10/22/2022 2159)  Achieves optimal ventilation and oxygenation:   Assess for changes in respiratory status   Assess for changes in mentation and behavior   Position to facilitate oxygenation and minimize respiratory effort   Oxygen supplementation based on oxygen saturation or arterial blood gases   Encourage broncho-pulmonary hygiene including cough, deep breathe, incentive spirometry   Assess the need for suctioning and aspirate as needed   Assess and instruct to report shortness of breath or any respiratory difficulty   Respiratory therapy support as indicated

## 2022-10-23 NOTE — PROGRESS NOTES
[facility]  Office: [address], DO, Chasidy Yoder, DO, Jayce Garcia, DO, Kayla Packer Blood, DO, Lyly Rogers MD, Mack Dancer, MD, Comfort Díaz MD, Gaurav Gutierrez MD,  Jose Dale MD, Celi Sorenson MD, Jose Raul Blue, DO, Jenny Morales MD,  Maryann Lake MD, Meliton Fatima MD, Mayelin Byrne, DO, Lisset Cole MD, Adan Neal MD, Javed Gaming MD, Kerri Santiago MD, Hiwot Orellana MD, Kamron Mario MD, Mitali Mace, DO, Luther Knott MD, Shayla Nelson MD, Janett Julien, CNP,  Mariam Conteh, CNP, Nate Hays, CNP, Mehnaz Hernández, CNP,  Cammie Crawford, DNP, Maik Moreira, CNP, Vania Hernandez, CNP, Ismael Garcia, CNP, Mann Jolly, CNP, Rafy Lopez, CNP, Vinod Bradley, PA-C, Shayan Sorenson, CNS, Marco Antonio Garcia, DNP, Hawk Sanchez, CNP, Jamilah Ledesma, CNP, Unknown Pump, [id number]    Progress Note    10/23/2022    10:57 AM    Name:   Laurence Lynn  MRN:     [id number]     Acct:      [de-identified]   Room:   [address] [place] [address] Day:  6  Admit Date:  10/17/2022  6:39 AM    PCP:   Lavelle Serrano MD  Code Status:  DNR-CCA    Subjective:     C/C:   Chief Complaint   Patient presents with    Abdominal Pain     Interval History Status: significantly improved. Patient was seen and examined at bedside this AM. She has significantly improved over the past 24-hours and is now tolerating a general diet (NG-tube was removed yesterday). Anticipate discharge within the next 24-48 hours pending continued clinical improvement. Brief History:     Patient is a 31-year-old female with a past medical history of chronic atrial fibrillation (on coumadin) who presented to the emergency department on 10/17/2022 complaining of abdominal pain. CT scan of the abdomen and pelvis was done in the ED and was remarkable for small bowel perforation.  General surgery was consulted and took the patient to the OR following admission. The patient is s/p exploratory laparotomy with small bowel resection and abdominal washout. Review of Systems:     Constitutional:  negative for chills, fevers, sweats  Respiratory:  negative for cough, dyspnea on exertion, shortness of breath, wheezing  Cardiovascular:  negative for chest pain, chest pressure/discomfort, lower extremity edema, palpitations  Gastrointestinal:  negative for abdominal pain, constipation, diarrhea, nausea, vomiting  Neurological:  negative for dizziness, headache    Medications: Allergies: Allergies   Allergen Reactions    Latex     Penicillins     Tylenol [Acetaminophen]        Current Meds:   Scheduled Meds:    sodium chloride  1,000 mL IntraVENous Once    scopolamine  1 patch TransDERmal Q72H    pantoprazole (PROTONIX) 40 mg injection  40 mg IntraVENous Daily    bacitracin   Topical BID    QUEtiapine  50 mg Oral Once    sodium chloride flush  5-40 mL IntraVENous 2 times per day    [Held by provider] enoxaparin  1 mg/kg SubCUTAneous BID    sodium chloride  80 mL IntraVENous Once    sodium chloride flush  5-40 mL IntraVENous 2 times per day     Continuous Infusions:    sodium chloride Stopped (10/18/22 0822)     PRN Meds: phenol, fentanNYL, traMADol, sodium chloride flush, sodium chloride flush, sodium chloride, potassium chloride **OR** potassium alternative oral replacement **OR** potassium chloride, magnesium sulfate, ondansetron **OR** ondansetron, morphine **OR** morphine    Data:     Past Medical History:   has a past medical history of Atrial fibrillation, Back pain, chronic, Dementia ([facility] [address] GERD (gastroesophageal reflux disease), Hiatal hernia, History of breast cancer, History of glaucoma, Hypertension, benign essential, goal below 140/90, and Hypothyroid. Social History:   reports that she has never smoked. She has never used smokeless tobacco. She reports that she does not drink alcohol and does not use drugs.      Family History: Family History   Problem Relation Age of Onset    No Known Problems Mother     No Known Problems Father        Vitals:  BP (!) 125/54   Pulse 80   Temp 97.3 °F (36.3 °C) (Temporal)   Resp 24   Ht 4' 11\" (1.499 m)   Wt 120 lb 5.9 oz (54.6 kg)   SpO2 97%   BMI 24.31 kg/m²   Temp (24hrs), Av.7 °F (36.5 °C), Min:97.2 °F (36.2 °C), Max:98.2 °F (36.8 °C)    Recent Labs     10/20/22  1103 10/20/22  1659 10/21/22  1114   POCGLU 132* 93 91       I/O (24Hr):     Intake/Output Summary (Last 24 hours) at 10/23/2022 1057  Last data filed at 10/23/2022 0602  Gross per 24 hour   Intake 200 ml   Output 850 ml   Net -650 ml       Labs:  Hematology:  Recent Labs     10/21/22  0522 10/22/22  0424 10/22/22  1500 10/23/22  0300 10/23/22  0740   WBC 4.1 5.7  --  5.9  --    RBC 2.58* 2.37*  --  2.11*  --    HGB 8.1* 7.6* 6.7* 6.6*  --    HCT 24.2* 22.2* 20.8* 20.5*  --    MCV 93.8 93.9  --  97.2  --    MCH 31.4 32.1  --  31.3  --    MCHC 33.5 34.2  --  32.2  --    RDW 14.0 13.5  --  13.3  --     166  --  150  --    MPV 7.6 7.4  --  9.0  --    INR  --  4.5 4.8*  --  1.3   DDIMER  --  1.76  --   --   --      Chemistry:  Recent Labs     10/21/22  0522 10/22/22  0424 10/22/22  1500 10/23/22  0300    140  --  140   K 3.9 3.4* 4.1 3.8    104  --  108*   CO2 25 23  --  24   GLUCOSE 83 93  --  85   BUN 20 16  --  10   CREATININE 0.76 0.67  --  0.66   MG  --  1.5* 2.1  --    ANIONGAP 9 13  --  8*   LABGLOM >60 >60  --  >60   CALCIUM 8.2* 7.9*  --  7.9*     Recent Labs     10/20/22  1103 10/20/22  1659 10/21/22  1114   POCGLU 132* 93 91     ABG:  Lab Results   Component Value Date/Time    POCPH 7.280 10/17/2022 06:52 PM    POCPCO2 38.8 10/17/2022 06:52 PM    POCPO2 354.1 10/17/2022 06:52 PM    POCHCO3 18.2 10/17/2022 06:52 PM    NBEA 8 10/17/2022 06:52 PM    KKCH9GDK 100 10/17/2022 06:52 PM    FIO2 30.0 10/18/2022 05:04 AM     Lab Results   Component Value Date/Time    SPECIAL NOT REPORTED 2017 04:50 PM     Lab Results   Component Value Date/Time    CULTURE NO SIGNIFICANT GROWTH 02/12/2017 04:50 PM    CULTURE  02/12/2017 04:50 PM     Performed at [facility], [address] [place] [address] [phone]       Radiology:  CT ABDOMEN PELVIS W IV CONTRAST Additional Contrast? None    Result Date: 10/17/2022  1. Interval development of ascites. Overall small volume. 2. In the anterior mid abdomen some loops of small bowel borderline dilated with 2 small gas pockets along the posterior aspect worrisome for extraluminal gas secondary to contained perforation. 3. No bowel obstruction. Findings were discussed with Dr Keith Estrella at 8:18 am on 10/17/2022. XR CHEST PORTABLE    Result Date: 10/17/2022  1. Endotracheal tube in expected position. 2. Central venous catheter terminates in the right atrium. 3. Enteric tube is below the diaphragm, extending beyond the field of view. Physical Examination:     General appearance:  Well-appearing. Mental Status:  Alert and oriented. Lungs:  clear to auscultation bilaterally, normal effort  Heart:  regular rate and rhythm, no murmur  Abdomen:  Abdominal binder in place. Extremities:  no edema, redness, tenderness in the calves  Skin:  Surgical wounds clean and dry. Assessment:     Hospital Problems             Last Modified POA    * (Principal) Perforated viscus 10/17/2022 Yes    Bowel perforation ([address] 10/18/2022 Yes    Hypothyroidism (Chronic) 10/18/2022 Yes    Overview Signed 6/5/2016 11:35 AM by [facility]     Replacing Inactive Diagnoses         Chronic atrial fibrillation ([address] (Chronic) 10/18/2022 Yes    Dementia with behavioral disturbance (Chronic) 10/18/2022 Yes    Abdominal pain 10/17/2022 Yes       Plan:      Bowel perforation   -S/P exploratory laparotomy with small bowel resection and abdominal washout 10/17   -Repeat CT abdomen and pelvis showing subcutaneous hematoma   -Plan to advance to a general diet today   -Daily CBC   -Daily BMP Melena   -Patient was noted to have a melanotic stool overnight   -Likely secondary to oozing from the anastomosis staple line   -Continue to monitor hemoglobin     Hypotension, resolved   -Secondary to lorazepam administration   -Avoid using benzodiazepines for agitation (recommend Zyprexa for severe agitation)     Chronic atrial fibrillation   -Holding Lovenox due to abdominal wall hematoma   -Continue holding Lovenox as hemoglobin continues to drop     Briseida Veliz MD  10/23/2022  10:57 AM

## 2022-10-23 NOTE — PLAN OF CARE
Problem: Pain  Goal: Verbalizes/displays adequate comfort level or baseline comfort level  10/23/2022 1542 by Linda Rosario RN  Outcome: Progressing  10/23/2022 0232 by Tristen Masters RN  Outcome: Progressing   Pain scale preformed per protocol and pt treated for pain as documented. Education given. Problem: Safety - Adult  Goal: Free from fall injury  10/23/2022 1542 by Linda Rosario RN  Outcome: Progressing  10/23/2022 0232 by Tristen Masters RN  Outcome: Progressing   Fall assessment preformed. Bed in low locked position with call light and tray table within reach. Education given. Will continue to monitor. Problem: Skin/Tissue Integrity - Adult  Goal: Skin integrity remains intact  Outcome: Progressing  Flowsheets (Taken 10/23/2022 1540)  Skin Integrity Remains Intact: Monitor for areas of redness and/or skin breakdown   Skin assessment preformed. Pt turned every 2 hours with heals elevated off bed. [place] mattress in place. Will continue to monitor.

## 2022-10-23 NOTE — PROGRESS NOTES
Nutrition Note    LOS nutrition evaluation 10/24/2022    Electronically signed by Memory Crigler, RD on 10/23/22 at 7:29 PM EDT  Memory Crigler, MPP-D, RDN, LD  Registered [address] [place] [address]  [phone]

## 2022-10-23 NOTE — PLAN OF CARE
Problem: Discharge Planning  Goal: Discharge to home or other facility with appropriate resources  10/23/2022 0232 by Ena Mckeon RN  Outcome: Progressing  10/22/2022 1749 by Brady Chahal RN  Outcome: Progressing  Flowsheets (Taken 10/22/2022 0800)  Discharge to home or other facility with appropriate resources: Identify barriers to discharge with patient and caregiver     Problem: Pain  Goal: Verbalizes/displays adequate comfort level or baseline comfort level  10/23/2022 0232 by Ena Mckeon RN  Outcome: Progressing  10/22/2022 1749 by Brady Chahal RN  Outcome: Progressing     Problem: Safety - Adult  Goal: Free from fall injury  10/23/2022 0232 by Ena Mckeon RN  Outcome: Progressing  10/22/2022 1749 by Brady Chahal RN  Outcome: Progressing     Problem: ABCDS Injury Assessment  Goal: Absence of physical injury  10/23/2022 0232 by Ena Mckeon RN  Outcome: Progressing  10/22/2022 1749 by Brady Chahal RN  Outcome: Progressing

## 2022-10-23 NOTE — PROGRESS NOTES
PATIENT NAME: Marya Wong     TODAY'S DATE: 10/23/2022, 8:39 AM    SUBJECTIVE:    79 y/o female POD 8 ex lap sbr for focal perforation of small bowel with no new complaints today. Patient able to sleep last night. Tolerating clears. Continues to have bowel movements. Denies fevers, chills, or sweats. OBJECTIVE:   VITALS:  BP (!) 125/54   Pulse 80   Temp 97.3 °F (36.3 °C) (Temporal)   Resp 24   Ht 4' 11\" (1.499 m)   Wt 120 lb 5.9 oz (54.6 kg)   SpO2 97%   BMI 24.31 kg/m²      INTAKE/OUTPUT:      Intake/Output Summary (Last 24 hours) at 10/23/2022 0839  Last data filed at 10/23/2022 0602  Gross per 24 hour   Intake 300 ml   Output 850 ml   Net -550 ml                 CONSTITUTIONAL:  Awake and alert. No acute distress  ABDOMEN:   Abdomen soft, non-tender, non-distended. Incision is clean, dry, and intact with no active bleeding and no purulent drainage. Bruising noted around inferior staple line but no cellulitis. Data:  CBC:   Lab Results   Component Value Date/Time    WBC 5.9 10/23/2022 03:00 AM    RBC 2.11 10/23/2022 03:00 AM    RBC 4.49 01/05/2012 07:37 AM    HGB 6.6 10/23/2022 03:00 AM    HCT 20.5 10/23/2022 03:00 AM    MCV 97.2 10/23/2022 03:00 AM    MCH 31.3 10/23/2022 03:00 AM    MCHC 32.2 10/23/2022 03:00 AM    RDW 13.3 10/23/2022 03:00 AM     10/23/2022 03:00 AM     01/05/2012 07:37 AM    MPV 9.0 10/23/2022 03:00 AM         ASSESSMENT   POD 8 ex lap sbr    Plan  Advance diet to full liquids. Continue supportive treatments. Will continue to monitor H/H.  INR normalized. Anticipate not further active bleeding from staple line or from bowel anastomosis.       Electronically signed by Donato Evans IV, DO  on 10/23/2022 at 8:39 AM

## 2022-10-24 ENCOUNTER — TELEPHONE (OUTPATIENT)
Dept: PHARMACY | Age: 87
End: 2022-10-24

## 2022-10-24 VITALS
HEART RATE: 108 BPM | SYSTOLIC BLOOD PRESSURE: 147 MMHG | HEIGHT: 59 IN | BODY MASS INDEX: 28 KG/M2 | DIASTOLIC BLOOD PRESSURE: 78 MMHG | TEMPERATURE: 98.2 F | OXYGEN SATURATION: 85 % | RESPIRATION RATE: 22 BRPM | WEIGHT: 138.89 LBS

## 2022-10-24 LAB
ABSOLUTE EOS #: 0.14 K/UL (ref 0–0.4)
ABSOLUTE LYMPH #: 1.4 K/UL (ref 1–4.8)
ABSOLUTE MONO #: 0.45 K/UL (ref 0.1–0.8)
ANION GAP SERPL CALCULATED.3IONS-SCNC: 8 MMOL/L (ref 9–17)
BASOPHILS # BLD: 0 % (ref 0–2)
BASOPHILS ABSOLUTE: 0 K/UL (ref 0–0.2)
BUN BLDV-MCNC: 7 MG/DL (ref 8–23)
CALCIUM SERPL-MCNC: 7.7 MG/DL (ref 8.6–10.4)
CHLORIDE BLD-SCNC: 107 MMOL/L (ref 98–107)
CO2: 25 MMOL/L (ref 20–31)
CREAT SERPL-MCNC: 0.65 MG/DL (ref 0.5–0.9)
EOSINOPHILS RELATIVE PERCENT: 3 % (ref 1–4)
GFR SERPL CREATININE-BSD FRML MDRD: >60 ML/MIN/1.73M2
GLUCOSE BLD-MCNC: 106 MG/DL (ref 70–99)
HCT VFR BLD CALC: 21.3 % (ref 36–46)
HEMOGLOBIN: 7.1 G/DL (ref 12–16)
LYMPHOCYTES # BLD: 31 % (ref 24–44)
MCH RBC QN AUTO: 31.5 PG (ref 26–34)
MCHC RBC AUTO-ENTMCNC: 33.1 G/DL (ref 31–37)
MCV RBC AUTO: 95 FL (ref 80–100)
MONOCYTES # BLD: 10 % (ref 1–7)
MORPHOLOGY: NORMAL
PDW BLD-RTO: 14.3 % (ref 12.5–15.4)
PLATELET # BLD: 179 K/UL (ref 140–450)
PMV BLD AUTO: 7.2 FL (ref 6–12)
POTASSIUM SERPL-SCNC: 4.1 MMOL/L (ref 3.7–5.3)
RBC # BLD: 2.24 M/UL (ref 4–5.2)
SEG NEUTROPHILS: 56 % (ref 36–66)
SEGMENTED NEUTROPHILS ABSOLUTE COUNT: 2.51 K/UL (ref 1.8–7.7)
SODIUM BLD-SCNC: 140 MMOL/L (ref 135–144)
WBC # BLD: 4.5 K/UL (ref 3.5–11)

## 2022-10-24 PROCEDURE — 36415 COLL VENOUS BLD VENIPUNCTURE: CPT

## 2022-10-24 PROCEDURE — 85025 COMPLETE CBC W/AUTO DIFF WBC: CPT

## 2022-10-24 PROCEDURE — 80048 BASIC METABOLIC PNL TOTAL CA: CPT

## 2022-10-24 PROCEDURE — 6370000000 HC RX 637 (ALT 250 FOR IP): Performed by: SURGERY

## 2022-10-24 PROCEDURE — A4216 STERILE WATER/SALINE, 10 ML: HCPCS | Performed by: STUDENT IN AN ORGANIZED HEALTH CARE EDUCATION/TRAINING PROGRAM

## 2022-10-24 PROCEDURE — C9113 INJ PANTOPRAZOLE SODIUM, VIA: HCPCS | Performed by: STUDENT IN AN ORGANIZED HEALTH CARE EDUCATION/TRAINING PROGRAM

## 2022-10-24 PROCEDURE — 2580000003 HC RX 258: Performed by: ANESTHESIOLOGY

## 2022-10-24 PROCEDURE — 6370000000 HC RX 637 (ALT 250 FOR IP): Performed by: NURSE PRACTITIONER

## 2022-10-24 PROCEDURE — 99239 HOSP IP/OBS DSCHRG MGMT >30: CPT | Performed by: STUDENT IN AN ORGANIZED HEALTH CARE EDUCATION/TRAINING PROGRAM

## 2022-10-24 PROCEDURE — 2580000003 HC RX 258: Performed by: STUDENT IN AN ORGANIZED HEALTH CARE EDUCATION/TRAINING PROGRAM

## 2022-10-24 PROCEDURE — 6360000002 HC RX W HCPCS: Performed by: STUDENT IN AN ORGANIZED HEALTH CARE EDUCATION/TRAINING PROGRAM

## 2022-10-24 RX ORDER — TRAMADOL HYDROCHLORIDE 50 MG/1
50 TABLET ORAL EVERY 4 HOURS PRN
Qty: 18 TABLET | Refills: 0 | Status: SHIPPED | OUTPATIENT
Start: 2022-10-24 | End: 2022-10-27

## 2022-10-24 RX ADMIN — BACITRACIN: 500 OINTMENT TOPICAL at 09:10

## 2022-10-24 RX ADMIN — SODIUM CHLORIDE, PRESERVATIVE FREE 40 MG: 5 INJECTION INTRAVENOUS at 09:09

## 2022-10-24 RX ADMIN — TRAMADOL HYDROCHLORIDE 50 MG: 50 TABLET, COATED ORAL at 09:42

## 2022-10-24 RX ADMIN — SODIUM CHLORIDE, PRESERVATIVE FREE 10 ML: 5 INJECTION INTRAVENOUS at 09:09

## 2022-10-24 ASSESSMENT — PAIN DESCRIPTION - ORIENTATION: ORIENTATION: MID

## 2022-10-24 ASSESSMENT — PAIN DESCRIPTION - DESCRIPTORS: DESCRIPTORS: DISCOMFORT

## 2022-10-24 ASSESSMENT — PAIN SCALES - GENERAL: PAINLEVEL_OUTOF10: 5

## 2022-10-24 ASSESSMENT — PAIN DESCRIPTION - LOCATION: LOCATION: ABDOMEN

## 2022-10-24 NOTE — PROGRESS NOTES
Lupie Epley requires a bedside commode due to being confined to one level of the home, and is physically incapable of utilizing regular toilet facilities. Current body weight is Weight: 138 lb 14.2 oz (63 kg).

## 2022-10-24 NOTE — DISCHARGE INSTR - COC
Continuity of Care Form    Patient Name: Maritza Sen   :  1932  MRN:  [id number]    Admit date:  10/17/2022  Discharge date:  ***    Code Status Order: DNR-CCA   Advance Directives:   [facility] Documentation       Date/Time Healthcare Directive Type of Healthcare Directive Copy in [address] Agent's Name Healthcare Agent's Phone Number    10/17/22 4894 Yes, patient has an advance directive for healthcare treatment -- -- -- -- --            Admitting Physician:  No admitting provider for patient encounter. PCP: Paddy Don MD    Discharging Nurse: [facility] Unit/Room#: 337/337-01  Discharging Unit Phone Number: ***    Emergency Contact:   Extended Emergency Contact Information  Primary Emergency Contact: Maeve Love  Address: [address] [place] [address], [facility]. Maria Cza Stagger of [facility] Phone: [phone]  Mobile Phone: [phone]  Relation: Child  Secondary Emergency Contact: Horacio Moraes States of [facility] Phone: [phone]  Mobile Phone: [phone]  Relation: Child    Past Surgical History:  Past Surgical History:   Procedure Laterality Date    BLADDER REPAIR      BREAST SURGERY      left mastectomy    CATARACT REMOVAL WITH IMPLANT      CHOLECYSTECTOMY      LAPAROTOMY N/A 10/17/2022    LAPAROTOMY EXPLORATORY WITH SMALL BOWL RESECTION AND CENTRAL LINE PLACEMENT performed by Wily Jaquez DO at [id number] [place] [address].       Immunization History:   Immunization History   Administered Date(s) Administered    COVID-19, PFIZER GRAY top, DO NOT Dilute, (age 15 y+), IM, 30 mcg/0.3 mL 2022    COVID-19, PFIZER PURPLE top, DILUTE for use, (age 15 y+), 30mcg/0.3mL 2021, 2021, 10/02/2021    Influenza Virus Vaccine 2015, 2019    Influenza, AFLURIA (age 1 yrs+), FLUZONE, (age 10 mo+), MDV, 0.5mL 10/28/2016, 2017    Influenza, FLUARIX, FLULAVAL, FLUZONE (age 10 mo+) AND AFLURIA, (age 1 y+), PF, 0.5mL 09/04/2020, 09/02/2022    Influenza, FLUZONE (age 72 y+), High Dose, 0.7mL 10/02/2021    Influenza, Triv, inactivated, subunit, adjuvanted, IM (Fluad 65 yrs and older) 11/01/2019    Pneumococcal Conjugate 13-valent (Neil Leee) 02/15/2017    Pneumococcal Polysaccharide (Krripcnop18) 01/01/2010       Active Problems:  Patient Active Problem List   Diagnosis Code    Hypertension, benign essential, goal below 140/90 I10    Back pain, chronic M54.9, G89.29    Hearing loss H91.90    Obesity (BMI 30.0-34. 9) E66.9    Hypothyroidism E03.9    Chronic atrial fibrillation (HCC) I48.20    History of breast cancer Z85.3    Dementia with behavioral disturbance F03.918    Abdominal pain R10.9    BMI 28.0-28.9,adult Z68.28    Hallucination, visual R44.1    Closed displaced fracture of middle phalanx of right middle finger S62.622A    Perforated viscus R19.8    Bowel perforation (HCC) K63.1       Isolation/Infection:   Isolation            No Isolation          Patient Infection Status       None to display            Nurse Assessment:  Last Vital Signs: BP (!) 147/78   Pulse (!) 108   Temp 98.2 °F (36.8 °C)   Resp 22   Ht 4' 11\" (1.499 m)   Wt 138 lb 14.2 oz (63 kg)   SpO2 (!) 85%   BMI 28.05 kg/m²     Last documented pain score (0-10 scale): Pain Level: 5  Last Weight:   Wt Readings from Last 1 Encounters:   10/24/22 138 lb 14.2 oz (63 kg)     Mental Status:  oriented and alert    IV Access:  - None    Nursing Mobility/ADLs:  Walking   Assisted  Transfer  Assisted  Bathing  Assisted  Dressing  Assisted  Toileting  Assisted  Feeding  Assisted  Med Admin  Dependent  Med Delivery   whole    Wound Care Documentation and Therapy:  Incision 10/17/22 Abdomen Medial;Upper (Active)   Dressing Status Dry; Intact; Old drainage noted 10/24/22 0745   Dressing Change Due 10/24/22 10/24/22 0745   Incision Cleansed Cleansed with saline 10/24/22 0745   Dressing/Treatment ABD pad 10/24/22 0745   Closure Ene; Sutures 10/24/22 0745   Margins Approximated 10/24/22 0745   Incision Assessment Other (Comment) 10/24/22 0745   Drainage Amount Scant 10/24/22 0745   Drainage Description Serosanguinous 10/24/22 0745   Odor None 10/21/22 1415   Number of days: 7        Elimination:  Continence: Bowel: Yes  Bladder: Yes  Urinary Catheter: None   Colostomy/Ileostomy/Ileal Conduit: No       Date of Last BM: 10/24/22    Intake/Output Summary (Last 24 hours) at 10/24/2022 1255  Last data filed at 10/24/2022 0609  Gross per 24 hour   Intake 888.54 ml   Output 800 ml   Net 88.54 ml     I/O last 3 completed shifts: In: 888.5 [P.O.:400; I.V.:488.5]  Out: 1100 [Urine:1100]    Safety Concerns: At Risk for Falls    Impairments/Disabilities:      None    Nutrition Therapy:  Current Nutrition Therapy:   - Oral Diet:  General    Routes of Feeding: Oral  Liquids: Thin Liquids  Daily Fluid Restriction: no  Last Modified Barium Swallow with Video (Video Swallowing Test): not done    Treatments at the Time of Hospital Discharge:   Respiratory Treatments: ***  Oxygen Therapy:  is not on home oxygen therapy.   Ventilator:    - No ventilator support    Rehab Therapies: Physical Therapy and Occupational Therapy  Weight Bearing Status/Restrictions: No weight bearing restrictions  Other Medical Equipment (for information only, NOT a DME order):  walker  Other Treatments: n/a    Patient's personal belongings (please select all that are sent with patient):  None    RN SIGNATURE:  Electronically signed by Sulma Escalante RN on 10/24/22 at 12:58 PM EDT    CASE MANAGEMENT/SOCIAL WORK SECTION    Inpatient Status Date: ***    Readmission Risk Assessment Score:  Readmission Risk              Risk of Unplanned Readmission:  22           Discharging to Facility/ Agency   Name: [facility]   Address: [address] [place], [address]  Phone: [phone]      / signature: Electronically signed by Deena Magana RN on 10/24/22 at 1:22 PM EDT    PHYSICIAN SECTION    Prognosis: Fair    Condition at Discharge: Stable    Rehab Potential (if transferring to Rehab): Fair    Recommended Labs or Other Treatments After Discharge: Home care. Resume coumadin in one-week. Physician Certification: I certify the above information and transfer of Neto Gupta  is necessary for the continuing treatment of the diagnosis listed and that she requires Home Care for greater 30 days.      Update Admission H&P: No change in H&P    PHYSICIAN SIGNATURE:  Electronically signed by Chris Cortez MD on 10/24/22 at 1:00 PM EDT

## 2022-10-24 NOTE — CARE COORDINATION
Transitional Planning    Spoke with daughter at bedside. She is unhappy that \"no one offered her mom a bedside commode. \" Writer explained to daughter that Geo Leonardarminda spoke with other daughter (whom pt actually lives with) last week about discharge plans. In that discussion the daughter stated that there are several family members have alternate schedules that can help care for patient and they do not anticipate any needs at this time. The daughter that is present at bedside states that writer never talked to them and her sister was contacting patient advocates because they were unhappy with discharge plans. She states \"we need help taking care of her. \" Gamal Armstrong states she would be happy to set up home care (nurse, PT, OT). Daughter is asking about aides- Geo Jessica explains that aides are not covered by Medicare. Daughter also stated that they thought transport was arraigned to take patient home. Writer explained to daughter that there is no need for patient to go home with transport because family is present, pt is able to walk, and the discharge plan is home. Referral sent to [address] [place] [address] for home care. DME order sent to [facility]. 1320 spoke with Walton Lefort at [address] [place] [address], they will accept.

## 2022-10-24 NOTE — DISCHARGE INSTRUCTIONS
Hold coumadin for one-week and follow-up with your primary care physician to determine if it is safe to resume this. Follow-up with general surgery for a post-operative follow-up in one-week.

## 2022-10-24 NOTE — DISCHARGE SUMMARY
[facility]  Office: [address], DO, Norma Faria, DO, Nga Izaguirre, DO, Roberto Saeed Blood, DO, Jeaneth Dowling MD, Izabela Soler MD, Natalia Mathews MD, Val Barreto MD,  Carlos Ferguson MD, Herson Pederson MD, Steven Jonas, DO, Chance Dwyer MD,  Freddy Breen, DO, Dani Wheeler MD, Flex Mcconnell MD, Liliam Prince DO, Lars Stratton MD, Boby Johnson MD, Jace Almonte DO, Babatunde Perez MD, Vianney López MD, Radha Jimenez MD, Lida Hoover MD, Kena Goldberg DO, Francy Medrano MD, Nicky Ortiz MD, Kaur Salas, Saira Schwartz, CNP, Phuong Lowe, CNP, Nahid Vogel, CNP,  Ana Maria He, DNP, Mar Warren, CNP, Annie Nicole, CNP, Connor Charles, CNP, Abran Kent, CNP, Alyssa Campoverde, CNP, Rosa Vela, PA-C, Calvin Lewis, CNS, Jasvir Steward, DNP, Evangelina Leonard, CNP, Chalo Galvan, CNP, Jaime Benavidez, CNP         [address] [facility]    Discharge Summary     Patient ID: Lamar Corado  :  1932   MRN: [id number]     ACCOUNT:  [de-identified]   Patient's PCP: Bull Garcia MD  Admit Date: 10/17/2022   Discharge Date: 10/24/2022     Length of Stay: 7  Code Status:  DNR-CCA  Admitting Physician: No admitting provider for patient encounter. Discharge Physician: Flex Mcconnell MD     Active Discharge Diagnoses:     Hospital Problem Lists:  Principal Problem:    Perforated viscus  Active Problems:    Bowel perforation ([address]    Hypothyroidism    Chronic atrial fibrillation ([address]    Dementia with behavioral disturbance    Abdominal pain  Resolved Problems:    * No resolved hospital problems. *      Admission Condition:  poor     Discharged Condition: good    Hospital Stay:     Hospital Course:   Lamar Corado is a 80 y.o. female with a past medical history of atrial fibrillation (on coumadin), dementia, hypothyroidism, hypertension and anxiety who presented to the emergency department on 10/17/2022 complaining of nausea/vomiting, abdominal pain and distension. CT scan of the abdomen and pelvis was done and was remarkable for bowel perforation. The patient was admitted to internal medicine for further management. General surgery was consulted on admission and took the patient to the OR for exploratory laparotomy with small bowel resection on 10/17. The patient's post-operative course was complicated by anemia and hospital delirium. 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 The patient improved and her bowel function returned during hospitalization. She is discharged home today (10/24) in stable condition. The patient is instructed to stop taking coumadin for seven-days and follow-up with her primary care physician to discuss resuming this (patient developed an abdominal wall hematoma as well as bleeding at the site of anastomosis during admission). The patient is further instructed to follow-up with general surgery in one-week as scheduled. Significant therapeutic interventions: Exploratory laparotomy; small bowel resection     Significant Diagnostic Studies:   Labs / Micro:  BMP:    Lab Results   Component Value Date/Time    GLUCOSE 106 10/24/2022 06:05 AM    GLUCOSE 102 01/05/2012 07:37 AM     10/24/2022 06:05 AM    K 4.1 10/24/2022 06:05 AM     10/24/2022 06:05 AM    CO2 25 10/24/2022 06:05 AM    ANIONGAP 8 10/24/2022 06:05 AM    BUN 7 10/24/2022 06:05 AM    CREATININE 0.65 10/24/2022 06:05 AM    BUNCRER NOT REPORTED 01/15/2021 02:13 PM    CALCIUM 7.7 10/24/2022 06:05 AM    LABGLOM >60 10/24/2022 06:05 AM    GFRAA >60 04/27/2022 09:37 PM    GFR      04/27/2022 09:37 PM     Radiology:  CT ABDOMEN PELVIS WO CONTRAST Additional Contrast? None    Result Date: 10/20/2022  Incisional subcutaneous hematoma lower anterior abdominal wall with associated soft tissue changes. No intra-abdominal hemorrhage.  S/p laparotomy (partial small bowel resection) with changes at the anastomosis, and mild more proximal small bowel dilatation with gas/fluid-filled borderline distended SB loops/stomach (postop ileus vs partial/low-grade obstruction). Small amount of postoperative peritoneal fluid. Mild abdominal wall and flank edema. Small pleural effusions with likely compressive atelectasis both lung bases. Additional likely incidental findings, as detailed in the body of the report above. CT HEAD WO CONTRAST    Result Date: 10/20/2022  Atrophy and chronic microvascular disease without acute intracranial abnormality. XR CHEST PORTABLE    Result Date: 10/20/2022  Interval extubation and NG tube removal.  Small pleural effusions and compressive atelectasis, better demonstrated on recent CT. No consolidation, pneumothorax or large pleural effusion. XR CHEST PORTABLE    Result Date: 10/17/2022  1. Endotracheal tube in expected position. 2. Central venous catheter terminates in the right atrium. 3. Enteric tube is below the diaphragm, extending beyond the field of view. Consultations:    Consults:     Final Specialist Recommendations/Findings:   IP CONSULT TO PULMONOLOGY  PHARMACY TO DOSE MEDICATION  IP CONSULT TO PALLIATIVE CARE      The patient was seen and examined on day of discharge and this discharge summary is in conjunction with any daily progress note from day of discharge. Discharge plan:     Disposition: Home    Physician Follow Up:     Hanh Quintero, [address] [facility]  [address] [facility] [id number]  [address] [place] [address]. [address] [place] [address] [phone]    Schedule an appointment as soon as possible for a visit in 1 week(s)  Post-op follow-up       Requiring Further Evaluation/Follow Up POST HOSPITALIZATION/Incidental Findings: Patient will need to follow-up with her primary care physician to discuss resuming coumadin in one-week.      Diet: regular diet    Activity: As tolerated    Instructions to Patient: Follow-up with your primary care physician and general surgery as scheduled. Discharge Medications:      Medication List        CHANGE how you take these medications      levothyroxine 100 MCG tablet  Commonly known as: SYNTHROID  Take 1 tablet by mouth Daily  What changed: See the new instructions. PreserVision AREDS 2 Caps  Take 1 capsule by mouth daily  What changed: Another medication with the same name was removed. Continue taking this medication, and follow the directions you see here. * traMADol 50 MG tablet  Commonly known as: ULTRAM  TAKE 1 TABLET BY MOUTH EVERY 8 HOURS AS NEEDED FOR PAIN FOR UP TO 30 DAYS *REDUCE DOSES TAKEN AS PAIN BECOMES MANAGEABLE*  What changed: Another medication with the same name was added. Make sure you understand how and when to take each. * traMADol 50 MG tablet  Commonly known as: Ultram  Take 1 tablet by mouth every 4 hours as needed for Pain for up to 3 days. Intended supply: 3 days. Take lowest dose possible to manage pain  What changed: You were already taking a medication with the same name, and this prescription was added. Make sure you understand how and when to take each. * This list has 2 medication(s) that are the same as other medications prescribed for you. Read the directions carefully, and ask your doctor or other care provider to review them with you.                 CONTINUE taking these medications      amLODIPine 5 MG tablet  Commonly known as: NORVASC  TAKE 1 TABLET BY MOUTH EVERY DAY     ascorbic acid 500 MG tablet  Commonly known as: VITAMIN C  TAKE 1 TABLET BY MOUTH EVERY DAY     atenolol 50 MG tablet  Commonly known as: TENORMIN  TAKE 1 TABLET BY MOUTH EVERY DAY     donepezil 5 MG tablet  Commonly known as: ARICEPT  TAKE (1) TABLET BY MOUTH NIGHTLY     doxazosin 1 MG tablet  Commonly known as: CARDURA  TAKE 1 TABLET BY MOUTH EVERY DAY     ferrous sulfate 325 (65 Fe) MG tablet  Commonly known as: FeroSul  TAKE 1 TABLET BY MOUTH DAILY WITH BREAKFAST     Handicap Placard Misc  by Does not apply route 5 years, cannot walk over 200 ft.     latanoprost 0.005 % ophthalmic solution  Commonly known as: XALATAN     mirtazapine 15 MG tablet  Commonly known as: REMERON  TAKE 1 TABLET BY MOUTH NIGHTLY     omeprazole 20 MG delayed release capsule  Commonly known as: PRILOSEC  TAKE 1 CAPSULE BY MOUTH ONCE DAILY BEFORE A MEAL     potassium chloride 10 MEQ extended release capsule  Commonly known as: MICRO-K  TAKE 1 CAPSULE BY MOUTH TWICE DAILY WITH FOOD     timolol 0.5 % ophthalmic solution  Commonly known as: TIMOPTIC            STOP taking these medications      warfarin 3 MG tablet  Commonly known as: COUMADIN               Where to Get Your Medications        These medications were sent to [facility], Sylvia Nunes [address] [place] [address]      Phone: [phone]   ferrous sulfate 325 (65 Fe) MG tablet  levothyroxine 100 MCG tablet       These medications were sent to [address] [place] [address] [address], [address] [place] [address] Drive  [address] [place], [place] [id number]      Phone: [phone]   traMADol 50 MG tablet         Discharge Procedure Orders   CBC with Auto Differential   Standing Status: Future Standing Exp. Date: 10/24/23       Time Spent on discharge is  31 mins in patient examination, evaluation, counseling as well as medication reconciliation, prescriptions for required medications, discharge plan and follow up. Electronically signed by   Briseida Veliz MD  10/24/2022  11:02 AM      Thank you Dr. Theresa Randall MD for the opportunity to be involved in this patient's care.

## 2022-10-24 NOTE — PROGRESS NOTES
Pt is discharged;writer went over discharge instructions with eliel Wesley;questions & concerns addressed;daughters Laurence Ariel verbalized understanding to the discharge instructions;pt Midline removed without any complications;catheter tip intact

## 2022-10-27 ENCOUNTER — HOSPITAL ENCOUNTER (OUTPATIENT)
Age: 87
Setting detail: SPECIMEN
Discharge: HOME | End: 2022-10-27
Payer: COMMERCIAL

## 2022-10-27 PROCEDURE — 85025 COMPLETE CBC W/AUTO DIFF WBC: CPT

## 2022-10-28 LAB
ABSOLUTE EOS #: 0.12 K/UL (ref 0–0.44)
ABSOLUTE IMMATURE GRANULOCYTE: 0.14 K/UL (ref 0–0.3)
ABSOLUTE LYMPH #: 1.76 K/UL (ref 1.1–3.7)
ABSOLUTE MONO #: 0.52 K/UL (ref 0.1–1.2)
BASOPHILS # BLD: 0 % (ref 0–2)
BASOPHILS ABSOLUTE: <0.03 K/UL (ref 0–0.2)
EOSINOPHILS RELATIVE PERCENT: 2 % (ref 1–4)
HCT VFR BLD CALC: 24.3 % (ref 36.3–47.1)
HEMOGLOBIN: 7.4 G/DL (ref 11.9–15.1)
IMMATURE GRANULOCYTES: 2 %
LYMPHOCYTES # BLD: 28 % (ref 24–43)
MCH RBC QN AUTO: 31.8 PG (ref 25.2–33.5)
MCHC RBC AUTO-ENTMCNC: 30.5 G/DL (ref 28.4–34.8)
MCV RBC AUTO: 104.3 FL (ref 82.6–102.9)
MONOCYTES # BLD: 8 % (ref 3–12)
NRBC AUTOMATED: 1 PER 100 WBC
PDW BLD-RTO: 15.3 % (ref 11.8–14.4)
PLATELET # BLD: 244 K/UL (ref 138–453)
PMV BLD AUTO: 10.4 FL (ref 8.1–13.5)
RBC # BLD: 2.33 M/UL (ref 3.95–5.11)
RBC # BLD: ABNORMAL 10*6/UL
SEG NEUTROPHILS: 59 % (ref 36–65)
SEGMENTED NEUTROPHILS ABSOLUTE COUNT: 3.69 K/UL (ref 1.5–8.1)
WBC # BLD: 6.3 K/UL (ref 3.5–11.3)

## 2022-10-31 ENCOUNTER — OFFICE VISIT (OUTPATIENT)
Dept: SURGERY | Age: 87
End: 2022-10-31

## 2022-10-31 VITALS
DIASTOLIC BLOOD PRESSURE: 67 MMHG | HEIGHT: 59 IN | RESPIRATION RATE: 16 BRPM | SYSTOLIC BLOOD PRESSURE: 104 MMHG | BODY MASS INDEX: 27.82 KG/M2 | OXYGEN SATURATION: 100 % | HEART RATE: 71 BPM | WEIGHT: 138 LBS

## 2022-10-31 DIAGNOSIS — Z98.890 STATUS POST EXPLORATORY LAPAROTOMY: Primary | ICD-10-CM

## 2022-10-31 PROCEDURE — 99024 POSTOP FOLLOW-UP VISIT: CPT | Performed by: SURGERY

## 2022-10-31 NOTE — PROGRESS NOTES
[id number] [facility] Surgery   History & Physical  Deya Lane DO    PATIENT NAME: Shaji [facility] VISIT DATE: 10/31/2022    SUBJECTIVE:  Lupie Epley is a 80 y.o. female who presents to the clinic today for follow up to hospital admission for small bowel abscess thought to have originated from a small bowel perforation, she underwent exploratory laparotomy with short segment small bowel resection and anastomosis performed 10/17/22. No new issues since surgery, pt is tolerating regular diet and having normal BM. 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 Resection margins are viable with acute serositis. Benign reactive lymph nodes with siderosis. OBJECTIVE:    /67 (Site: Left Upper Arm, Position: Sitting, Cuff Size: Medium Adult)   Pulse 71   Resp 16   Ht 4' 11\" (1.499 m)   Wt 138 lb (62.6 kg)   SpO2 100%   BMI 27.87 kg/m²     General Appearance:  awake, alert, no acute distress, well developed, well nourished   Skin:  Skin color, texture, turgor normal. No rashes or lesions. Lungs:  Normal chest expansion, unlabored breathing without accessory muscle use. No audible rales, rhonchi, or wheezing. Cardiovascular: S1S2. No evidence of JVD. No evidence of pulsatile masses in abdomen  Abdomen:  Soft, non-tender, no organomegaly, no masses. Laparotomy incision with sutures intact, the lower aspect of the incision shows some skin necrosis ~3cm in length on the left side of the incision, there is some resolving hematoma that is expressed from the incision. No evidence of infection. Musculoskeletal: No evidence of bony/muscular deformities, trauma, atrophy of either left/right upper/lower extremity. No evidence of digital clubbing or cyanosis. ASSESSMENT:  1. Status post exploratory laparotomy          PLAN:  The upper half of the staples were removed today, advised pt/daughter to continue to wash the incision at least twice daily and cover, RTC in one week to assess the incision. May consider referral to wound care for continued monitoring.   Diet/activity as tolerated    Electronically signed by Sam Vegas DO on 10/31/2022 at 5:52 PM

## 2022-10-31 NOTE — LETTER
[facility]  Surgical Specialties - General Surgery  [address]  [place], [address]  Phone: [phone]  Fax: [phone]      10/31/22    Patient: Chip Ansari  MRN: [id number]  : 1932  Date of visit: 10/31/2022    Dear Mao Quintanilla MD:      I saw Chip Ansari in post admission follow up for small bowel abscess where she underwent exploratory laparotomy and small bowel resection. Below are the relevant portions of my assessment and plan of care. If you have questions, please do not hesitate to call me. I look forward to following Chip Ansari along with you.     Sincerely,        Yanira Marcial, DO

## 2022-11-18 ENCOUNTER — TELEPHONE (OUTPATIENT)
Dept: PHARMACY | Age: 87
End: 2022-11-18

## 2022-11-22 ENCOUNTER — OFFICE VISIT (OUTPATIENT)
Dept: SURGERY | Age: 87
End: 2022-11-22

## 2022-11-22 ENCOUNTER — TELEPHONE (OUTPATIENT)
Dept: PHARMACY | Age: 87
End: 2022-11-22

## 2022-11-22 VITALS
HEART RATE: 74 BPM | DIASTOLIC BLOOD PRESSURE: 72 MMHG | BODY MASS INDEX: 24.39 KG/M2 | SYSTOLIC BLOOD PRESSURE: 111 MMHG | HEIGHT: 59 IN | WEIGHT: 121 LBS

## 2022-11-22 DIAGNOSIS — Z98.890 STATUS POST EXPLORATORY LAPAROTOMY: Primary | ICD-10-CM

## 2022-11-22 DIAGNOSIS — S31.109A OPEN ABDOMINAL WALL WOUND, INITIAL ENCOUNTER: ICD-10-CM

## 2022-11-22 PROCEDURE — 99024 POSTOP FOLLOW-UP VISIT: CPT | Performed by: SURGERY

## 2022-11-22 NOTE — TELEPHONE ENCOUNTER
Received call from patient daughter Holden Carbajal patient off warfarin permanently  Will discharge from our clinic

## 2022-11-23 NOTE — PROGRESS NOTES
[id number] [address] [facility] Surgery   History & Physical  Rojas Zamudio DO    PATIENT NAME: Simba Mccoy     CLINIC VISIT DATE: 11/22/2022    SUBJECTIVE:  Simba Mccoy is a 80 y.o. female who presents to the clinic today for wound check, her daughter and granddaughter are also present. Patient has had home health care monitoring wound, home health nurses have noted widening of the wound with some drainage and asked to have the patient evaluated today. Patient reports no new symptoms, denies any abdominal wall pain or any other new issues. OBJECTIVE:    /72 (Site: Right Upper Arm, Position: Sitting, Cuff Size: Medium Adult)   Pulse 74   Ht 4' 11\" (1.499 m)   Wt 121 lb (54.9 kg)   BMI 24.44 kg/m²     General Appearance:  awake, alert, no acute distress, well developed, well nourished   Skin:  Skin color, texture, turgor normal. No rashes or lesions. Lungs:  Normal chest expansion, unlabored breathing without accessory muscle use. No audible rales, rhonchi, or wheezing. Cardiovascular: S1S2. No evidence of JVD. No evidence of pulsatile masses in abdomen  Abdomen:  Soft. Several staples are still intact, the majority of the midline laparotomy wound is now healed, the midportion of the wound adjacent to the umbilicus shows some superficial skin necrosis, the deeper tissues demonstrate expected hematoma with no evidence of active bleeding or infection, there is no drainage at this time  Musculoskeletal: No evidence of bony/muscular deformities, trauma, atrophy of either left/right upper/lower extremity. No evidence of digital clubbing or cyanosis. ASSESSMENT:  1. Status post exploratory laparotomy    2. Open abdominal wall wound, initial encounter          PLAN:  Staples removed in clinic without issue. Subq hematoma was expected given her superficial skin bleeding on POD2 and need for anticoagulation.  Wound packed with iodoform gauze to facilitate drainage and infection prophylaxis, pt's daughter advised to keep covered and trim 1/2\" of packing daily. Plan to follow up in one week to monitor progress, will evaluate for wound care clinic follow up at that time. All questions were answered, pt and daughter are agreeable to this plan.       Electronically signed by Yennifer Corbin DO on 11/23/2022 at 11:14 AM

## 2022-11-29 ENCOUNTER — OFFICE VISIT (OUTPATIENT)
Dept: SURGERY | Age: 87
End: 2022-11-29

## 2022-11-29 VITALS
HEIGHT: 59 IN | DIASTOLIC BLOOD PRESSURE: 81 MMHG | OXYGEN SATURATION: 100 % | HEART RATE: 85 BPM | SYSTOLIC BLOOD PRESSURE: 128 MMHG | BODY MASS INDEX: 24.39 KG/M2 | WEIGHT: 121 LBS | RESPIRATION RATE: 16 BRPM

## 2022-11-29 DIAGNOSIS — S31.109A OPEN ABDOMINAL WALL WOUND, INITIAL ENCOUNTER: Primary | ICD-10-CM

## 2022-11-29 PROCEDURE — 99024 POSTOP FOLLOW-UP VISIT: CPT | Performed by: SURGERY

## 2022-11-29 NOTE — LETTER
[facility]  Surgical Specialties - General Surgery  [address]  [place], [address]  Phone: [phone]  Fax: [phone]      22    Patient: Marya Wong  MRN: [id number]  : 1932  Date of visit: 2022    Dear Jason Valiente MD:      I saw Marya Wong for her stable abdominal wall wound, I have referred her to [facility] wound care clinic for continued wound care. Below are the relevant portions of my assessment and plan of care. If you have questions, please do not hesitate to call me. I look forward to following Marya Wong along with you.     Sincerely,        Alexis Ibarra DO

## 2022-11-29 NOTE — PROGRESS NOTES
[id number] [address] [facility] Surgery   History & Physical  John Ansari DO    PATIENT NAME: Adolph Ontiveros     CLINIC VISIT DATE: 11/29/2022    SUBJECTIVE:  Adolph Ontiveros is a 80 y.o. female who presents to the clinic today for follow up wound check, no new issues. Her son-in-law is present today. OBJECTIVE:    /81   Pulse 85   Resp 16   Ht 4' 11\" (1.499 m)   Wt 121 lb (54.9 kg)   SpO2 100%   BMI 24.44 kg/m²     General Appearance:  awake, alert, no acute distress, well developed, well nourished   Skin:  Skin color, texture, turgor normal. No rashes or lesions. Lungs:  Normal chest expansion, unlabored breathing without accessory muscle use. No audible rales, rhonchi, or wheezing. Cardiovascular: S1S2. No evidence of JVD. No evidence of pulsatile masses in abdomen  Abdomen:  wound overall clean with normal skin in the periphery, there is fibrinous material at the base with no evidence of bleeding/infection  Musculoskeletal: No evidence of bony/muscular deformities, trauma, atrophy of either left/right upper/lower extremity. No evidence of digital clubbing or cyanosis. ASSESSMENT:  1. Open abdominal wall wound, initial encounter          PLAN:  Refer to [address] [place] [address] wound care clinic for continued debridement/wound care (pt preference). Majority of fibrinous material debrided today with plain packing replaced in the wound and dressed with sterile dressings.      Electronically signed by John Ansari DO on 11/29/2022 at 1:13 PM

## 2022-12-04 NOTE — DISCHARGE INSTRUCTIONS
[address]      Visit  Discharge Instructions / Physician Orders  Leisa:     SUPPLIES ORDERED THRU:                     DATE LAST SUPPLIED     Wound Location:  ABD     Cleanse with: Liquid antibacterial soap and water, rinse well      Dressing Orders:  Primary dressing                       Secondary dressing                           secure with           x 30days     Frequency:       Additional Orders: Increase protein to diet (meat, cheese, eggs, fish, peanut butter, nuts and beans)  Multivitamin daily    OFFLOADING [] YES  TYPE:                  [] NA    Weekly wound care visits until determined otherwise. Antibiotic therapy-wound care related YES [] NO [] NA[]    MY CHART []     Smart Device  []     HYPERBARIC TREATMENT-                TREATMENT #                          Your next appointment with the [address] [place] [address] is in 1 week                                                                                                   (Please note your next appointment above and if you are unable to keep, kindly give a 24 hour notice. Thank you.)  If more than 15 min late we cannot guarantee you will be seen due to clinician schedule  Per Policy, Excessive cancellation will call for dismissal from program.  If you experience any of the following, please call the [address] [place] [address] during business hours:  [phone]     * Increase in Pain  * Temperature over 101  * Increase in drainage from your wound  * Drainage with a foul odor  * Bleeding  * Increase in swelling  * Need for compression bandage changes due to slippage, breakthrough drainage. If you need medical attention outside of the business hours of the [address] [place] [address] please contact your PCP or go to the nearest emergency room. The information contained in the After Visit Summary has been reviewed with me, the patient and/or responsible adult, by my health care provider(s).  I had the opportunity to ask questions regarding this information.  I have elected to receive;      []After Visit Summary  [x]Comprehensive Discharge Instruction      Patient signature______________________________________Date:________

## 2022-12-05 ENCOUNTER — HOSPITAL ENCOUNTER (OUTPATIENT)
Dept: WOUND CARE | Age: 87
Discharge: HOME | End: 2022-12-05
Payer: COMMERCIAL

## 2022-12-05 ENCOUNTER — HOSPITAL ENCOUNTER (OUTPATIENT)
Dept: WOUND CARE | Age: 87
Discharge: HOME | End: 2022-12-05

## 2022-12-05 VITALS
RESPIRATION RATE: 16 BRPM | WEIGHT: 121 LBS | HEART RATE: 77 BPM | TEMPERATURE: 98.8 F | BODY MASS INDEX: 24.44 KG/M2 | SYSTOLIC BLOOD PRESSURE: 114 MMHG | DIASTOLIC BLOOD PRESSURE: 58 MMHG

## 2022-12-05 DIAGNOSIS — S31.109A OPEN WOUND OF ABDOMINAL WALL, INITIAL ENCOUNTER: ICD-10-CM

## 2022-12-05 PROCEDURE — 87077 CULTURE AEROBIC IDENTIFY: CPT

## 2022-12-05 PROCEDURE — 87075 CULTR BACTERIA EXCEPT BLOOD: CPT

## 2022-12-05 PROCEDURE — 11042 DBRDMT SUBQ TIS 1ST 20SQCM/<: CPT | Performed by: PLASTIC SURGERY

## 2022-12-05 PROCEDURE — 99203 OFFICE O/P NEW LOW 30 MIN: CPT | Performed by: PLASTIC SURGERY

## 2022-12-05 PROCEDURE — 87186 SC STD MICRODIL/AGAR DIL: CPT

## 2022-12-05 PROCEDURE — 99213 OFFICE O/P EST LOW 20 MIN: CPT

## 2022-12-05 PROCEDURE — 87205 SMEAR GRAM STAIN: CPT

## 2022-12-05 PROCEDURE — 87070 CULTURE OTHR SPECIMN AEROBIC: CPT

## 2022-12-05 PROCEDURE — 11042 DBRDMT SUBQ TIS 1ST 20SQCM/<: CPT

## 2022-12-05 PROCEDURE — 87176 TISSUE HOMOGENIZATION CULTR: CPT

## 2022-12-05 RX ORDER — BACITRACIN ZINC AND POLYMYXIN B SULFATE 500; 1000 [USP'U]/G; [USP'U]/G
OINTMENT TOPICAL ONCE
OUTPATIENT
Start: 2022-12-05 | End: 2022-12-05

## 2022-12-05 RX ORDER — GINSENG 100 MG
CAPSULE ORAL ONCE
OUTPATIENT
Start: 2022-12-05 | End: 2022-12-05

## 2022-12-05 RX ORDER — LIDOCAINE 40 MG/G
CREAM TOPICAL ONCE
OUTPATIENT
Start: 2022-12-05 | End: 2022-12-05

## 2022-12-05 RX ORDER — CLOBETASOL PROPIONATE 0.5 MG/G
OINTMENT TOPICAL ONCE
OUTPATIENT
Start: 2022-12-05 | End: 2022-12-05

## 2022-12-05 RX ORDER — LIDOCAINE 50 MG/G
OINTMENT TOPICAL ONCE
OUTPATIENT
Start: 2022-12-05 | End: 2022-12-05

## 2022-12-05 RX ORDER — BETAMETHASONE DIPROPIONATE 0.05 %
OINTMENT (GRAM) TOPICAL ONCE
OUTPATIENT
Start: 2022-12-05 | End: 2022-12-05

## 2022-12-05 RX ORDER — LIDOCAINE HYDROCHLORIDE 20 MG/ML
JELLY TOPICAL ONCE
OUTPATIENT
Start: 2022-12-05 | End: 2022-12-05

## 2022-12-05 RX ORDER — LIDOCAINE HYDROCHLORIDE 40 MG/ML
SOLUTION TOPICAL ONCE
OUTPATIENT
Start: 2022-12-05 | End: 2022-12-05

## 2022-12-05 RX ORDER — GENTAMICIN SULFATE 1 MG/G
OINTMENT TOPICAL ONCE
OUTPATIENT
Start: 2022-12-05 | End: 2022-12-05

## 2022-12-05 RX ORDER — BACITRACIN, NEOMYCIN, POLYMYXIN B 400; 3.5; 5 [USP'U]/G; MG/G; [USP'U]/G
OINTMENT TOPICAL ONCE
OUTPATIENT
Start: 2022-12-05 | End: 2022-12-05

## 2022-12-05 NOTE — PROGRESS NOTES
[address]       Progress Note and Procedure Note      History and Physical     Chief Complaint   Patient presents with    Wound Check     abdomen        HPI:   Maritza Sen is a 80 y.o. female who presents for wound ulcer evaluation. History of Wound Context:   Patient is a 70-year-old female who presents for wound evaluation. Patient is status post exploratory lap. She had a small wound dehiscence. Patient's appetite is poor at this time. Wound/Ulcer Pain Timing/Severity: none  Quality of pain: N/A  Severity:  0 / 10   Modifying Factors: None  Associated Signs/Symptoms: none    Ulcer Identification:  Ulcer Type: non-healing surgical  Contributing Factors: none        Medications:     Current Outpatient Medications   Medication Sig Dispense Refill    vitamin C (ASCORBIC ACID) 500 MG tablet TAKE 1 TABLET BY MOUTH EVERY DAY 30 tablet 10    amLODIPine (NORVASC) 5 MG tablet TAKE 1 TABLET BY MOUTH EVERY DAY 30 tablet 10    atenolol (TENORMIN) 50 MG tablet TAKE 1 TABLET BY MOUTH EVERY DAY 30 tablet 10    donepezil (ARICEPT) 5 MG tablet TAKE 1 TABLET BY MOUTH NIGHTLY 30 tablet 10    doxazosin (CARDURA) 1 MG tablet TAKE 1 TABLET BY MOUTH EVERY DAY 30 tablet 10    traMADol (ULTRAM) 50 MG tablet Take 1 tablet by mouth every 4 hours as needed for Pain for up to 3 days. Intended supply: 3 days.  Take lowest dose possible to manage pain 18 tablet 0    levothyroxine (SYNTHROID) 100 MCG tablet Take 1 tablet by mouth Daily 90 tablet 3    ferrous sulfate (FEROSUL) 325 (65 Fe) MG tablet TAKE 1 TABLET BY MOUTH DAILY WITH BREAKFAST 90 tablet 3    omeprazole (PRILOSEC) 20 MG delayed release capsule TAKE 1 CAPSULE BY MOUTH ONCE DAILY BEFORE A MEAL 30 capsule 10    potassium chloride (MICRO-K) 10 MEQ extended release capsule TAKE 1 CAPSULE BY MOUTH TWICE DAILY WITH FOOD 60 capsule 10    traMADol (ULTRAM) 50 MG tablet TAKE 1 TABLET BY MOUTH EVERY 8 HOURS AS NEEDED FOR PAIN FOR UP TO 30 DAYS *REDUCE DOSES TAKEN AS PAIN BECOMES MANAGEABLE* 90 tablet 0    Handicap Placard MISC by Does not apply route 5 years, cannot walk over 200 ft. 1 each 0    mirtazapine (REMERON) 15 MG tablet TAKE 1 TABLET BY MOUTH NIGHTLY 30 tablet 10    Multiple Vitamins-Minerals (PRESERVISION AREDS 2) CAPS Take 1 capsule by mouth daily 100 capsule 2    timolol (TIMOPTIC) 0.5 % ophthalmic solution instill 1 drop into both eyes twice a day  0    latanoprost (XALATAN) 0.005 % ophthalmic solution Apply to eye daily  1     No current facility-administered medications for this encounter. Allergies: Allergies   Allergen Reactions    Latex     Penicillins     Tylenol [Acetaminophen]      Review of Systems:   Constitutional: Negative for fever, chills, fatigue and unexpected weight change. HENT: Negative for hearing loss, sore throat and facial swelling. Eyes: Negative for pain and discharge. Respiratory: Negative for cough and shortness of breath. Cardiovascular: Negative for chest pain. Gastrointestinal: Negative for nausea, vomiting, diarrhea and constipation. Skin: Negative for pallor and rash. Neurological: Negative for seizures, syncope and numbness. Hematological: Does not bruise/bleed easily. Psychiatric/Behavioral: Negative for behavioral problems. The patient is not nervous/anxious.       Past Medical History:   Diagnosis Date    Atrial fibrillation 3/28/2014    Back pain, chronic 6/1/2015    Dementia ([facility] [address]     GERD (gastroesophageal reflux disease)     Hiatal hernia     History of breast cancer 12/1/2015    History of glaucoma     Hypertension, benign essential, goal below 140/90 6/1/2015    Hypothyroid 6/1/2015     Past Surgical History:   Procedure Laterality Date    BLADDER REPAIR      BREAST SURGERY      left mastectomy    CATARACT REMOVAL WITH IMPLANT      CHOLECYSTECTOMY      LAPAROTOMY N/A 10/17/2022    LAPAROTOMY EXPLORATORY WITH SMALL BOWL RESECTION AND CENTRAL LINE PLACEMENT performed by Yanira Marcial, DO at [facility] [address] History     Socioeconomic History    Marital status:      Spouse name: Not on file    Number of children: Not on file    Years of education: Not on file    Highest education level: Not on file   Occupational History    Not on file   Tobacco Use    Smoking status: Never    Smokeless tobacco: Never   Vaping Use    Vaping Use: Never used   Substance and Sexual Activity    Alcohol use: No     Alcohol/week: 0.0 standard drinks    Drug use: No    Sexual activity: Not on file   Other Topics Concern    Not on file   Social History Narrative    Not on file     Social Determinants of Health     Financial Resource Strain: Not on file   Food Insecurity: Not on file   Transportation Needs: Not on file   Physical Activity: Inactive    Days of Exercise per Week: 0 days    Minutes of Exercise per Session: 0 min   Stress: Not on file   Social Connections: Not on file   Intimate Partner Violence: Not on file   Housing Stability: Not on file     Family History   Problem Relation Age of Onset    No Known Problems Mother     No Known Problems Father      Physical Exam:   BP (!) 114/58   Pulse 77   Temp 98.8 °F (37.1 °C) (Tympanic)   Resp 16   Wt 121 lb (54.9 kg)   BMI 24.44 kg/m²    Body mass index is 24.44 kg/m². Physical Exam   Nursing note and vitals reviewed. Constitutional: Oriented to person, place, and time. Appears well-developed and well-nourished. No distress. Head: Normocephalic and atraumatic. Eyes: Conjunctivae and EOM are normal.   Pulmonary/Chest: Effort normal. No respiratory distress. Neurological: Alert and oriented to person, place, and time. Skin: Skin is warm and dry. No rash noted. Psychiatric: Normal mood and affect.  Behavior is normal            Post Debridement Measurements:  Wound/Ulcer Descriptions are Pre Debridement except measurements:    Wound 12/05/22 Abdomen Lower;Mid #1 (Active)   Wound Image   12/05/22 1333   Wound Etiology Non-Healing Surgical 12/05/22 1333   Dressing Status New drainage noted; Old drainage noted 12/05/22 1333   Wound Cleansed Cleansed with saline 12/05/22 1333   Wound Length (cm) 2.7 cm 12/05/22 1333   Wound Width (cm) 1.5 cm 12/05/22 1333   Wound Depth (cm) 2.1 cm 12/05/22 1333   Wound Surface Area (cm^2) 4.05 cm^2 12/05/22 1333   Wound Volume (cm^3) 8.505 cm^3 12/05/22 1333   Undermining Starts ___ O'Clock 1200 12/05/22 1333   Undermining Ends___ O'Clock 1200 12/05/22 1333   Undermining Maxium Distance (cm) [email or web address] 12/05/22 1333   Wound Assessment Slough;Pink/red;Fibrinous 12/05/22 1333   Drainage Amount Moderate 12/05/22 1333   Drainage Description Yellow;Serosanguinous 12/05/22 1333   Odor None 12/05/22 1333   Catalina-wound Assessment Blanchable erythema 12/05/22 1333   Margins Defined edges 12/05/22 1333   Wound Thickness Description not for Pressure Injury Full thickness 12/05/22 1333   Number of days: 0     Incision 10/17/22 Abdomen Medial;Upper (Active)   Number of days: 49       Procedure Note  Indications:  Based on my examination of this patient's wound(s)/ulcer(s) today, debridement is required to promote healing and evaluate the wound base. Performed by: Lorna Donnelly MD    Consent obtained:  Yes    Time out taken:  Yes    Pain Control: Anesthetic  Anesthetic: 4% Lidocaine Liquid Topical       Percent of Wound(s)/Ulcer(s) Debrided: 100%    Total Surface Area Debrided: Excisional debridement abdomen through the subcutaneous tissue measuring 4.05 sq cm       Diabetic/Pressure/Non Pressure Ulcers only:  Ulcer: Non-Pressure ulcer, fat layer exposed      Estimated Blood Loss:  Minimal    Hemostasis Achieved:  by pressure    Procedural Pain:  0  / 10     Post Procedural Pain:  0 / 10     Response to treatment:  Well tolerated by patient.          Imaging:   [unfilled]        Impression/Plan:     Problem List Items Addressed This Visit    None    Patient Active Problem List   Diagnosis    Hypertension, benign essential, goal below 140/90    Back pain, chronic    Hearing loss    Obesity (BMI 30.0-34. 9)    Hypothyroidism    Chronic atrial fibrillation (HCC)    History of breast cancer    Dementia with behavioral disturbance    Abdominal pain    BMI 28.0-28.9,adult    Hallucination, visual    Closed displaced fracture of middle phalanx of right middle finger    Perforated viscus    Bowel perforation ([facility] [address]       Plan:  Wound care as per discharge instructions.        Electronically signed by:  Ezequiel Scott MD 12/5/2022

## 2022-12-05 NOTE — DISCHARGE INSTRUCTIONS
[address] [place]      Visit  Discharge Instructions / Physician Orders  [address] IT ARRIVES  SHE WILL BE ON A Monday WED Friday CHANGE SCHEDULE     SUPPLIES ORDERED THRU:   none             DATE LAST SUPPLIED     Wound Location:  ABD     Cleanse with: Liquid antibacterial soap and water, rinse well      Dressing Orders:  Primary dressing    normal saline moist to moist dressing                   Secondary dressing                           secure with           x 30days   until wound vac arrives Skin prep around wound then Drape wound with [employer] Film- fill wound with black foam set at 125mmhg continuous  Frequency:  Daily until vac arrives then Change MWF     Additional Orders: Increase protein to diet (meat, cheese, eggs, fish, peanut butter, nuts and beans)  Multivitamin daily    OFFLOADING [] YES  TYPE:                  [] NA    Weekly wound care visits until determined otherwise. Antibiotic therapy-wound care related YES [] NO [] NA[]    MY CHART []     Smart Device  []     HYPERBARIC TREATMENT-                TREATMENT #                          Your next appointment with the [address] [place] [address] is in 1 week  Bring Cannister and foam to wound care                                                                                                   (Please note your next appointment above and if you are unable to keep, kindly give a 24 hour notice.  Thank you.)  If more than 15 min late we cannot guarantee you will be seen due to clinician schedule  Per Policy, Excessive cancellation will call for dismissal from program.  If you experience any of the following, please call the [address] [place] [address] during business hours:  [phone]     * Increase in Pain  * Temperature over 101  * Increase in drainage from your wound  * Drainage with a foul odor  * Bleeding  * Increase in swelling  * Need for compression bandage changes due

## 2022-12-05 NOTE — PROGRESS NOTES
Order for V.A.C.®  Negative Pressure Wound Therapy  Please fax this form to I at [facility] Customer Service: [phone] [facility] [address]:   [address] [place] [address]  [phone]  WOUND CARE [phone]  FAX NUMBER [phone]  Patient Information:   [facility]  [address] [address]   [phone]   : 1932  AGE: 80 y.o. GENDER: female   TODAYS DATE:  2022  Insurance:   PRIMARY INSURANCE:  Plan: [employer] DUAL  Coverage: [employer] DUAL BENEFITS  Effective Date: 2015  [id number] - (Medicare Managed)    Payer/Plan Subscr  Sex Relation Sub. Ins. ID Effective Group Num   1. PACO HINTON* SELENA ALMANZA 1932 Female Self [id number] 1/1/15 [id number]                                   [address]       Patient Wound Information:     Duration of the V.A.C.®  Negative Pressure Wound Therapy: 3 Month which includes up to 15 dressings per wound and up to 10 canisters per month    Goal at the completion of V.A.C.®  Negative Pressure Therapy: Assist in granulation tissue formation     Will HomeCare provide V.A.C. ®  Therapy? Yes Homecare will provide VAC Therapy. [facility] for Delivery:     Delivery Location V.A.C. ® Therapy Pump:   [facility]  [address] [address]  [phone]  Up to 15 dressings per wound, per month  VAC Canisters: Up to 10 canisters per month  V.A.C.® Dressing: Letts Benitez with VAC GRANUFOAM Dressing Kit                                    LARGE  Clinical Information by Wound Type: Was NPWT initiated in an inpatient facility? No or  Has the patient been on NPWT anytime during the last 60 days? No     Is the patients nutritional status compromised?  No    Please saqib all dressings that apply  Please indicate all other therapies that have been previously tried and/or failed to maintain a moist wound environment: Saline moisten gauze     Please saqib all that apply  If other therapies were considered and ruled out, what conditions prevented you from using other therapies prior to applying V.A.C. ® Therapy? Need for accelerated granulation tissue    Please saqib all that apply  Which of the following co?morbidities apply? N/A    Is Osteomyelitis present in Wound? No    Patient's Primary Wound Type:     Patient's Primary Wound Type: Surgical This wound was surgically created     Is this wound a direct result of an accident? No     Wound(s) Description:     Is there eschar tissue present in the wound? No Eschar in noted in Wound(s) base  Has debridement been attempted in the last 10 days? Yes Date 12/5/22 type of debridement Excisional Debridement  Are serial debridements required? Yes    Wound 12/05/22 Abdomen Lower;Mid #1 (Active)   Wound Image   12/05/22 1333   Wound Etiology Non-Healing Surgical 12/05/22 1333   Dressing Status New drainage noted; Old drainage noted 12/05/22 1333   Wound Cleansed Cleansed with saline 12/05/22 1333   Wound Length (cm) 2.7 cm 12/05/22 1333   Wound Width (cm) 1.5 cm 12/05/22 1333   Wound Depth (cm) 2.1 cm 12/05/22 1333   Wound Surface Area (cm^2) 4.05 cm^2 12/05/22 1333   Wound Volume (cm^3) 8.505 cm^3 12/05/22 1333   Undermining Starts ___ O'Clock 1200 12/05/22 1333   Undermining Ends___ O'Clock 1200 12/05/22 1333   Undermining Maxium Distance (cm) [email or web address] 12/05/22 1333   Wound Assessment Slough;Pink/red;Fibrinous 12/05/22 1333   Drainage Amount Moderate 12/05/22 1333   Drainage Description Yellow;Serosanguinous 12/05/22 1333   Odor None 12/05/22 1333   Catalina-wound Assessment Blanchable erythema 12/05/22 1333   Margins Defined edges 12/05/22 1333   Wound Thickness Description not for Pressure Injury Full thickness 12/05/22 1333   Number of days: 0       Electronically signed by Robb Eubanks RN on 12/5/2022 at 2:30 PM    Providers Information:      PROVIDER'S NAME: Alfonso Lopez Jaime Knox  [place]-[phone]

## 2022-12-08 LAB
CULTURE: ABNORMAL
CULTURE: ABNORMAL
DIRECT EXAM: ABNORMAL
DIRECT EXAM: ABNORMAL
SPECIMEN DESCRIPTION: ABNORMAL

## 2022-12-10 ENCOUNTER — HOSPITAL ENCOUNTER (INPATIENT)
Age: 87
LOS: 5 days | Discharge: NURSING, REHAB OR LONG-TERM CARE | End: 2022-12-15
Attending: EMERGENCY MEDICINE | Admitting: SURGERY
Payer: COMMERCIAL

## 2022-12-10 ENCOUNTER — APPOINTMENT (OUTPATIENT)
Dept: CT IMAGING | Age: 87
End: 2022-12-10
Payer: COMMERCIAL

## 2022-12-10 ENCOUNTER — APPOINTMENT (OUTPATIENT)
Dept: MRI IMAGING | Age: 87
End: 2022-12-10
Payer: COMMERCIAL

## 2022-12-10 ENCOUNTER — APPOINTMENT (OUTPATIENT)
Dept: GENERAL RADIOLOGY | Age: 87
End: 2022-12-10
Payer: COMMERCIAL

## 2022-12-10 ENCOUNTER — HOSPITAL ENCOUNTER (EMERGENCY)
Age: 87
Discharge: ANOTHER HOSPITAL | End: 2022-12-10
Attending: EMERGENCY MEDICINE
Payer: COMMERCIAL

## 2022-12-10 VITALS
HEIGHT: 59 IN | SYSTOLIC BLOOD PRESSURE: 137 MMHG | BODY MASS INDEX: 21.97 KG/M2 | TEMPERATURE: 97.8 F | WEIGHT: 109 LBS | OXYGEN SATURATION: 93 % | HEART RATE: 72 BPM | RESPIRATION RATE: 12 BRPM | DIASTOLIC BLOOD PRESSURE: 71 MMHG

## 2022-12-10 DIAGNOSIS — S12.391A OTHER CLOSED NONDISPLACED FRACTURE OF FOURTH CERVICAL VERTEBRA, INITIAL ENCOUNTER (HCC): ICD-10-CM

## 2022-12-10 DIAGNOSIS — S12.101A CLOSED NONDISPLACED FRACTURE OF SECOND CERVICAL VERTEBRA, UNSPECIFIED FRACTURE MORPHOLOGY, INITIAL ENCOUNTER (HCC): Primary | ICD-10-CM

## 2022-12-10 DIAGNOSIS — S12.9XXA CERVICAL COMPRESSION FRACTURE, INITIAL ENCOUNTER (HCC): ICD-10-CM
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
## 2022-12-10 DIAGNOSIS — S12.191S OTHER CLOSED NONDISPLACED FRACTURE OF SECOND CERVICAL VERTEBRA, SEQUELA: ICD-10-CM

## 2022-12-10 DIAGNOSIS — S12.301A CLOSED NONDISPLACED FRACTURE OF FOURTH CERVICAL VERTEBRA, UNSPECIFIED FRACTURE MORPHOLOGY, INITIAL ENCOUNTER (HCC): ICD-10-CM

## 2022-12-10 LAB
ABSOLUTE EOS #: <0.03 K/UL (ref 0–0.44)
ABSOLUTE IMMATURE GRANULOCYTE: 0.05 K/UL (ref 0–0.3)
ABSOLUTE LYMPH #: 0.73 K/UL (ref 1.1–3.7)
ABSOLUTE MONO #: 0.35 K/UL (ref 0.1–1.2)
ANION GAP SERPL CALCULATED.3IONS-SCNC: 17 MMOL/L (ref 9–17)
BASOPHILS # BLD: 0 % (ref 0–2)
BASOPHILS ABSOLUTE: 0.03 K/UL (ref 0–0.2)
BUN BLDV-MCNC: 33 MG/DL (ref 8–23)
CALCIUM SERPL-MCNC: 9.2 MG/DL (ref 8.6–10.4)
CHLORIDE BLD-SCNC: 100 MMOL/L (ref 98–107)
CO2: 16 MMOL/L (ref 20–31)
CREAT SERPL-MCNC: 1.34 MG/DL (ref 0.5–0.9)
EOSINOPHILS RELATIVE PERCENT: 0 % (ref 1–4)
GFR SERPL CREATININE-BSD FRML MDRD: 38 ML/MIN/1.73M2
GLUCOSE BLD-MCNC: 99 MG/DL (ref 70–99)
HCT VFR BLD CALC: 38 % (ref 36.3–47.1)
HEMOGLOBIN: 11.3 G/DL (ref 11.9–15.1)
IMMATURE GRANULOCYTES: 1 %
LYMPHOCYTES # BLD: 9 % (ref 24–43)
MAGNESIUM: 2.1 MG/DL (ref 1.6–2.6)
MCH RBC QN AUTO: 31.1 PG (ref 25.2–33.5)
MCHC RBC AUTO-ENTMCNC: 29.7 G/DL (ref 28.4–34.8)
MCV RBC AUTO: 104.7 FL (ref 82.6–102.9)
MONOCYTES # BLD: 4 % (ref 3–12)
NRBC AUTOMATED: 0 PER 100 WBC
PDW BLD-RTO: 13.2 % (ref 11.8–14.4)
PLATELET # BLD: 171 K/UL (ref 138–453)
PMV BLD AUTO: 9.4 FL (ref 8.1–13.5)
POTASSIUM SERPL-SCNC: 5.4 MMOL/L (ref 3.7–5.3)
POTASSIUM SERPL-SCNC: 5.5 MMOL/L (ref 3.7–5.3)
POTASSIUM SERPL-SCNC: 5.5 MMOL/L (ref 3.7–5.3)
RBC # BLD: 3.63 M/UL (ref 3.95–5.11)
RBC # BLD: ABNORMAL 10*6/UL
SEG NEUTROPHILS: 86 % (ref 36–65)
SEGMENTED NEUTROPHILS ABSOLUTE COUNT: 6.95 K/UL (ref 1.5–8.1)
SODIUM BLD-SCNC: 133 MMOL/L (ref 135–144)
WBC # BLD: 8.1 K/UL (ref 3.5–11.3)

## 2022-12-10 PROCEDURE — 83735 ASSAY OF MAGNESIUM: CPT

## 2022-12-10 PROCEDURE — 85025 COMPLETE CBC W/AUTO DIFF WBC: CPT

## 2022-12-10 PROCEDURE — 96374 THER/PROPH/DIAG INJ IV PUSH: CPT

## 2022-12-10 PROCEDURE — 3209999900 CT THORACIC SPINE TRAUMA RECONSTRUCTION

## 2022-12-10 PROCEDURE — 74176 CT ABD & PELVIS W/O CONTRAST: CPT

## 2022-12-10 PROCEDURE — 6360000002 HC RX W HCPCS: Performed by: EMERGENCY MEDICINE

## 2022-12-10 PROCEDURE — 6370000000 HC RX 637 (ALT 250 FOR IP): Performed by: STUDENT IN AN ORGANIZED HEALTH CARE EDUCATION/TRAINING PROGRAM

## 2022-12-10 PROCEDURE — 73030 X-RAY EXAM OF SHOULDER: CPT

## 2022-12-10 PROCEDURE — 96375 TX/PRO/DX INJ NEW DRUG ADDON: CPT

## 2022-12-10 PROCEDURE — 80048 BASIC METABOLIC PNL TOTAL CA: CPT

## 2022-12-10 PROCEDURE — 70450 CT HEAD/BRAIN W/O DYE: CPT

## 2022-12-10 PROCEDURE — 51701 INSERT BLADDER CATHETER: CPT

## 2022-12-10 PROCEDURE — 84132 ASSAY OF SERUM POTASSIUM: CPT

## 2022-12-10 PROCEDURE — 36415 COLL VENOUS BLD VENIPUNCTURE: CPT

## 2022-12-10 PROCEDURE — 2580000003 HC RX 258: Performed by: STUDENT IN AN ORGANIZED HEALTH CARE EDUCATION/TRAINING PROGRAM

## 2022-12-10 PROCEDURE — 70496 CT ANGIOGRAPHY HEAD: CPT

## 2022-12-10 PROCEDURE — 72125 CT NECK SPINE W/O DYE: CPT

## 2022-12-10 PROCEDURE — 2060000000 HC ICU INTERMEDIATE R&B

## 2022-12-10 PROCEDURE — 72141 MRI NECK SPINE W/O DYE: CPT

## 2022-12-10 PROCEDURE — 6360000002 HC RX W HCPCS: Performed by: STUDENT IN AN ORGANIZED HEALTH CARE EDUCATION/TRAINING PROGRAM

## 2022-12-10 PROCEDURE — 2580000003 HC RX 258

## 2022-12-10 PROCEDURE — 6370000000 HC RX 637 (ALT 250 FOR IP)

## 2022-12-10 PROCEDURE — 99285 EMERGENCY DEPT VISIT HI MDM: CPT

## 2022-12-10 PROCEDURE — 51798 US URINE CAPACITY MEASURE: CPT

## 2022-12-10 PROCEDURE — 93005 ELECTROCARDIOGRAM TRACING: CPT | Performed by: EMERGENCY MEDICINE

## 2022-12-10 PROCEDURE — 3209999900 CT LUMBAR SPINE TRAUMA RECONSTRUCTION

## 2022-12-10 PROCEDURE — 6360000004 HC RX CONTRAST MEDICATION: Performed by: STUDENT IN AN ORGANIZED HEALTH CARE EDUCATION/TRAINING PROGRAM

## 2022-12-10 RX ORDER — ATENOLOL 50 MG/1
50 TABLET ORAL DAILY
Status: DISCONTINUED | OUTPATIENT
Start: 2022-12-11 | End: 2022-12-15 | Stop reason: HOSPADM

## 2022-12-10 RX ORDER — M-VIT,TX,IRON,MINS/CALC/FOLIC 27MG-0.4MG
1 TABLET ORAL DAILY
Status: DISCONTINUED | OUTPATIENT
Start: 2022-12-11 | End: 2022-12-15 | Stop reason: HOSPADM

## 2022-12-10 RX ORDER — FENTANYL CITRATE 50 UG/ML
25 INJECTION, SOLUTION INTRAMUSCULAR; INTRAVENOUS ONCE
Status: COMPLETED | OUTPATIENT
Start: 2022-12-10 | End: 2022-12-10

## 2022-12-10 RX ORDER — PANTOPRAZOLE SODIUM 40 MG/1
40 TABLET, DELAYED RELEASE ORAL
Status: DISCONTINUED | OUTPATIENT
Start: 2022-12-11 | End: 2022-12-15 | Stop reason: HOSPADM

## 2022-12-10 RX ORDER — ASCORBIC ACID 500 MG
500 TABLET ORAL DAILY
Status: DISCONTINUED | OUTPATIENT
Start: 2022-12-11 | End: 2022-12-15 | Stop reason: HOSPADM

## 2022-12-10 RX ORDER — SENNA PLUS 8.6 MG/1
1 TABLET ORAL DAILY PRN
Status: DISCONTINUED | OUTPATIENT
Start: 2022-12-10 | End: 2022-12-10

## 2022-12-10 RX ORDER — SENNA PLUS 8.6 MG/1
1 TABLET ORAL NIGHTLY
Status: DISCONTINUED | OUTPATIENT
Start: 2022-12-10 | End: 2022-12-15 | Stop reason: HOSPADM

## 2022-12-10 RX ORDER — MIDAZOLAM HYDROCHLORIDE 2 MG/2ML
1 INJECTION, SOLUTION INTRAMUSCULAR; INTRAVENOUS ONCE
Status: COMPLETED | OUTPATIENT
Start: 2022-12-10 | End: 2022-12-10

## 2022-12-10 RX ORDER — ONDANSETRON 2 MG/ML
4 INJECTION INTRAMUSCULAR; INTRAVENOUS ONCE
Status: COMPLETED | OUTPATIENT
Start: 2022-12-10 | End: 2022-12-10

## 2022-12-10 RX ORDER — MIRTAZAPINE 15 MG/1
15 TABLET, FILM COATED ORAL NIGHTLY
Status: DISCONTINUED | OUTPATIENT
Start: 2022-12-10 | End: 2022-12-15 | Stop reason: HOSPADM

## 2022-12-10 RX ORDER — DOXAZOSIN MESYLATE 1 MG/1
1 TABLET ORAL DAILY
Status: DISCONTINUED | OUTPATIENT
Start: 2022-12-11 | End: 2022-12-15 | Stop reason: HOSPADM

## 2022-12-10 RX ORDER — SODIUM CHLORIDE 0.9 % (FLUSH) 0.9 %
5-40 SYRINGE (ML) INJECTION PRN
Status: DISCONTINUED | OUTPATIENT
Start: 2022-12-10 | End: 2022-12-15 | Stop reason: HOSPADM

## 2022-12-10 RX ORDER — ONDANSETRON 2 MG/ML
4 INJECTION INTRAMUSCULAR; INTRAVENOUS EVERY 6 HOURS PRN
Status: DISCONTINUED | OUTPATIENT
Start: 2022-12-10 | End: 2022-12-12

## 2022-12-10 RX ORDER — BISACODYL 10 MG
10 SUPPOSITORY, RECTAL RECTAL DAILY PRN
Status: DISCONTINUED | OUTPATIENT
Start: 2022-12-10 | End: 2022-12-12

## 2022-12-10 RX ORDER — POLYETHYLENE GLYCOL 3350 17 G/17G
17 POWDER, FOR SOLUTION ORAL DAILY
Status: DISCONTINUED | OUTPATIENT
Start: 2022-12-10 | End: 2022-12-15 | Stop reason: HOSPADM

## 2022-12-10 RX ORDER — LEVOTHYROXINE SODIUM 0.1 MG/1
100 TABLET ORAL DAILY
Status: DISCONTINUED | OUTPATIENT
Start: 2022-12-11 | End: 2022-12-15 | Stop reason: HOSPADM

## 2022-12-10 RX ORDER — DEXTROSE MONOHYDRATE 100 MG/ML
INJECTION, SOLUTION INTRAVENOUS CONTINUOUS PRN
Status: DISCONTINUED | OUTPATIENT
Start: 2022-12-10 | End: 2022-12-12

## 2022-12-10 RX ORDER — 0.9 % SODIUM CHLORIDE 0.9 %
500 INTRAVENOUS SOLUTION INTRAVENOUS ONCE
Status: COMPLETED | OUTPATIENT
Start: 2022-12-10 | End: 2022-12-10

## 2022-12-10 RX ORDER — MORPHINE SULFATE 4 MG/ML
4 INJECTION, SOLUTION INTRAMUSCULAR; INTRAVENOUS ONCE
Status: COMPLETED | OUTPATIENT
Start: 2022-12-10 | End: 2022-12-10

## 2022-12-10 RX ORDER — DONEPEZIL HYDROCHLORIDE 5 MG/1
5 TABLET, FILM COATED ORAL NIGHTLY
Status: DISCONTINUED | OUTPATIENT
Start: 2022-12-10 | End: 2022-12-15 | Stop reason: HOSPADM

## 2022-12-10 RX ORDER — ONDANSETRON 4 MG/1
4 TABLET, ORALLY DISINTEGRATING ORAL EVERY 8 HOURS PRN
Status: DISCONTINUED | OUTPATIENT
Start: 2022-12-10 | End: 2022-12-12

## 2022-12-10 RX ORDER — SODIUM CHLORIDE 0.9 % (FLUSH) 0.9 %
5-40 SYRINGE (ML) INJECTION EVERY 12 HOURS SCHEDULED
Status: DISCONTINUED | OUTPATIENT
Start: 2022-12-10 | End: 2022-12-12

## 2022-12-10 RX ORDER — GABAPENTIN 100 MG/1
100 CAPSULE ORAL EVERY 8 HOURS
Status: DISCONTINUED | OUTPATIENT
Start: 2022-12-10 | End: 2022-12-12

## 2022-12-10 RX ORDER — SULFAMETHOXAZOLE AND TRIMETHOPRIM 800; 160 MG/1; MG/1
1 TABLET ORAL 2 TIMES DAILY
Status: DISCONTINUED | OUTPATIENT
Start: 2022-12-10 | End: 2022-12-11

## 2022-12-10 RX ORDER — SODIUM CHLORIDE 9 MG/ML
INJECTION, SOLUTION INTRAVENOUS CONTINUOUS
Status: DISCONTINUED | OUTPATIENT
Start: 2022-12-10 | End: 2022-12-12

## 2022-12-10 RX ORDER — LANOLIN ALCOHOL/MO/W.PET/CERES
325 CREAM (GRAM) TOPICAL
Status: DISCONTINUED | OUTPATIENT
Start: 2022-12-11 | End: 2022-12-15 | Stop reason: HOSPADM

## 2022-12-10 RX ORDER — SODIUM CHLORIDE 9 MG/ML
INJECTION, SOLUTION INTRAVENOUS PRN
Status: DISCONTINUED | OUTPATIENT
Start: 2022-12-10 | End: 2022-12-12

## 2022-12-10 RX ORDER — TRAMADOL HYDROCHLORIDE 50 MG/1
50 TABLET ORAL EVERY 6 HOURS PRN
Status: DISCONTINUED | OUTPATIENT
Start: 2022-12-10 | End: 2022-12-15 | Stop reason: HOSPADM

## 2022-12-10 RX ADMIN — MIDAZOLAM 1 MG: 1 INJECTION INTRAMUSCULAR; INTRAVENOUS at 18:37

## 2022-12-10 RX ADMIN — SODIUM CHLORIDE 500 ML: 9 INJECTION, SOLUTION INTRAVENOUS at 16:57

## 2022-12-10 RX ADMIN — IOPAMIDOL 90 ML: 755 INJECTION, SOLUTION INTRAVENOUS at 18:27

## 2022-12-10 RX ADMIN — MIRTAZAPINE 15 MG: 15 TABLET, FILM COATED ORAL at 22:56

## 2022-12-10 RX ADMIN — TRAMADOL HYDROCHLORIDE 50 MG: 50 TABLET, COATED ORAL at 20:38

## 2022-12-10 RX ADMIN — DONEPEZIL HYDROCHLORIDE 5 MG: 5 TABLET, FILM COATED ORAL at 22:56

## 2022-12-10 RX ADMIN — FENTANYL CITRATE 25 MCG: 50 INJECTION INTRAMUSCULAR; INTRAVENOUS at 15:07

## 2022-12-10 RX ADMIN — MORPHINE SULFATE 4 MG: 4 INJECTION INTRAVENOUS at 16:07

## 2022-12-10 RX ADMIN — MIDAZOLAM 1 MG: 1 INJECTION INTRAMUSCULAR; INTRAVENOUS at 19:11

## 2022-12-10 RX ADMIN — GABAPENTIN 100 MG: 100 CAPSULE ORAL at 22:56

## 2022-12-10 RX ADMIN — SODIUM CHLORIDE, PRESERVATIVE FREE 10 ML: 5 INJECTION INTRAVENOUS at 22:57

## 2022-12-10 RX ADMIN — ONDANSETRON 4 MG: 2 INJECTION INTRAMUSCULAR; INTRAVENOUS at 16:07

## 2022-12-10 RX ADMIN — SODIUM CHLORIDE: 9 INJECTION, SOLUTION INTRAVENOUS at 21:48

## 2022-12-10 RX ADMIN — INSULIN HUMAN 10 UNITS: 100 INJECTION, SOLUTION PARENTERAL at 21:52

## 2022-12-10 RX ADMIN — DEXTROSE MONOHYDRATE 250 ML: 100 INJECTION, SOLUTION INTRAVENOUS at 21:48

## 2022-12-10 RX ADMIN — SENNOSIDES 8.6 MG: 8.6 TABLET, COATED ORAL at 22:56

## 2022-12-10 RX ADMIN — SULFAMETHOXAZOLE AND TRIMETHOPRIM 1 TABLET: 800; 160 TABLET ORAL at 22:56

## 2022-12-10 ASSESSMENT — ENCOUNTER SYMPTOMS
PHOTOPHOBIA: 0
WHEEZING: 0
COUGH: 0
ABDOMINAL PAIN: 0
VOMITING: 0
CONSTIPATION: 0
CHEST TIGHTNESS: 0
COLOR CHANGE: 0
DIARRHEA: 0
BACK PAIN: 0
SHORTNESS OF BREATH: 0

## 2022-12-10 ASSESSMENT — PAIN SCALES - GENERAL: PAINLEVEL_OUTOF10: 7

## 2022-12-10 ASSESSMENT — PAIN DESCRIPTION - LOCATION
LOCATION: ABDOMEN
LOCATION: GENERALIZED

## 2022-12-10 ASSESSMENT — PAIN - FUNCTIONAL ASSESSMENT
PAIN_FUNCTIONAL_ASSESSMENT: 0-10
PAIN_FUNCTIONAL_ASSESSMENT: 0-10

## 2022-12-10 NOTE — ED PROVIDER NOTES
[place] [place] [facility] Emergency Department  [id number] [address] [facility] [address] [facility] [address]  Phone: [phone]  Fax: [phone]      Pt Name: Lupie Epley  AAD:[id number]  [place] 7/30/1932  Date of evaluation: 12/10/2022      CHIEF COMPLAINT       Chief Complaint   Patient presents with    Fall     Pt brought by squad from home   Unwitnessed fall- pain in neck & right shoulder pain  50 mcg fentanyl given by squad intranasally       HISTORY OF PRESENT ILLNESS   60-year-old female presents to the emergency department today after she had a mechanical fall at home. She was using her walking and getting out of bed moving to the bathroom when she lost her balance and fell into the bed. She is complaining of neck and left shoulder pain. She did strike her head but there is no apparent trauma. She is not on blood thinners. She was complaining of severe pain and EMS gave her fentanyl and her pain is now controlled. She has no complaints for me currently. Family members report that she has had a complicated recent history with perforated bowel requiring emergency surgery. She subsequently had urinary tract infection with hallucinations. All of these seem to be doing better after initiation of antibiotics for the UTI and surgery for her perforation. REVIEW OF SYSTEMS     Review of Systems   All other systems reviewed and are negative. PAST MEDICAL HISTORY    has a past medical history of Atrial fibrillation, Back pain, chronic, Dementia ([address] GERD (gastroesophageal reflux disease), Hiatal hernia, History of breast cancer, History of glaucoma, Hypertension, benign essential, goal below 140/90, and Hypothyroid. SURGICAL HISTORY      has a past surgical history that includes Cholecystectomy; Breast surgery; Cataract removal with implant; bladder repair; and laparotomy (N/A, 10/17/2022).     CURRENT MEDICATIONS       Previous Medications    ATENOLOL (TENORMIN) 50 MG TABLET    TAKE 1 TABLET BY MOUTH EVERY DAY    DONEPEZIL (ARICEPT) 5 MG TABLET    TAKE 1 TABLET BY MOUTH NIGHTLY    DOXAZOSIN (CARDURA) 1 MG TABLET    TAKE 1 TABLET BY MOUTH EVERY DAY    FERROUS SULFATE (FEROSUL) 325 (65 FE) MG TABLET    TAKE 1 TABLET BY MOUTH DAILY WITH BREAKFAST    HANDICAP PLACARD MISC    by Does not apply route 5 years, cannot walk over 200 ft. LEVOTHYROXINE (SYNTHROID) 100 MCG TABLET    Take 1 tablet by mouth Daily    MIRTAZAPINE (REMERON) 15 MG TABLET    TAKE 1 TABLET BY MOUTH NIGHTLY    MULTIPLE VITAMINS-MINERALS (PRESERVISION AREDS 2) CAPS    Take 1 capsule by mouth daily    OMEPRAZOLE (PRILOSEC) 20 MG DELAYED RELEASE CAPSULE    TAKE 1 CAPSULE BY MOUTH ONCE DAILY BEFORE A MEAL    POTASSIUM CHLORIDE (MICRO-K) 10 MEQ EXTENDED RELEASE CAPSULE    TAKE 1 CAPSULE BY MOUTH TWICE DAILY WITH FOOD    SULFAMETHOXAZOLE-TRIMETHOPRIM (BACTRIM DS;SEPTRA DS) 800-160 MG PER TABLET    Take 1 tablet by mouth 2 times daily for 7 days    TRAMADOL (ULTRAM) 50 MG TABLET    Take 1 tablet by mouth every 4 hours as needed for Pain for up to 3 days. Intended supply: 3 days. Take lowest dose possible to manage pain    TRAMADOL (ULTRAM) 50 MG TABLET    TAKE 1 TABLET BY MOUTH EVERY 8 HOURS AS NEEDED FOR PAIN; *REDUCE DOSES TAKEN AS PAIN BECOMES MANAGEABLE*    VITAMIN C (ASCORBIC ACID) 500 MG TABLET    TAKE 1 TABLET BY MOUTH EVERY DAY       ALLERGIES     is allergic to latex, penicillins, and tylenol [acetaminophen]. FAMILY HISTORY     She indicated that her mother is . She indicated that her father is . family history includes No Known Problems in her father and mother. SOCIAL HISTORY      reports that she has never smoked. She has never used smokeless tobacco. She reports that she does not drink alcohol and does not use drugs. PHYSICAL EXAM     INITIAL VITALS:  height is 4' 11\" (1.499 m) and weight is 49.4 kg (109 lb). Her tympanic temperature is 97.8 °F (36.6 °C). Her blood pressure is 137/65 and her pulse is 71.  Her respiration is 12 and oxygen saturation is 94%. Physical Exam  Vitals reviewed. Constitutional:       General: She is not in acute distress. Appearance: She is well-developed. HENT:      Head: Normocephalic and atraumatic. Eyes:      Conjunctiva/sclera: Conjunctivae normal.      Pupils: Pupils are equal, round, and reactive to light. Neck:      Trachea: No tracheal deviation. Cardiovascular:      Rate and Rhythm: Normal rate and regular rhythm. Pulmonary:      Effort: No respiratory distress. Breath sounds: Normal breath sounds. Abdominal:      General: Bowel sounds are normal. There is no distension. Palpations: Abdomen is soft. Tenderness: There is no abdominal tenderness. Musculoskeletal:         General: No tenderness. Normal range of motion. Cervical back: Normal range of motion and neck supple. Skin:     General: Skin is warm and dry. Neurological:      Mental Status: She is alert and oriented to person, place, and time. Psychiatric:         Behavior: Behavior normal.         Thought Content: Thought content normal.         Judgment: Judgment normal.         DIFFERENTIAL DIAGNOSIS/ MDM:   CT images of head and neck and x-ray of this patient left shoulder has been ordered. Family did present here as the fall was witnessed on a camera at their house. Mechanism but seems to be low. DIAGNOSTIC RESULTS     EKG:  None    RADIOLOGY:   XR SHOULDER RIGHT (MIN 2 VIEWS)    Result Date: 12/10/2022  EXAMINATION: 3 XRAY VIEWS OF THE RIGHT SHOULDER 12/10/2022 1:12 pm COMPARISON: None. HISTORY: ORDERING SYSTEM PROVIDED HISTORY: shoulder pain TECHNOLOGIST PROVIDED HISTORY: shoulder pain Reason for Exam: recent fall, rt side neck and shoulder pain per patient FINDINGS: Glenohumeral joint is normally aligned. No evidence of acute fracture or dislocation. No abnormal periarticular calcifications. AC joint degenerative changes Visualized lung is unremarkable.   Prominent right hilar density appears to represent a prominent right artery. No acute abnormality. AC joint degenerative changes     CT Head W/O Contrast    Result Date: 12/10/2022  EXAMINATION: CT OF THE HEAD WITHOUT CONTRAST  12/10/2022 1:18 pm TECHNIQUE: CT of the head was performed without the administration of intravenous contrast. Automated exposure control, iterative reconstruction, and/or weight based adjustment of the mA/kV was utilized to reduce the radiation dose to as low as reasonably achievable. COMPARISON: 10/20/2022 HISTORY: ORDERING SYSTEM PROVIDED HISTORY: [facility] TECHNOLOGIST PROVIDED HISTORY: [facility] Decision Support Exception - unselect if not a suspected or confirmed emergency medical condition->Emergency Medical Condition (MA) Reason for Exam: Pt fell and complains of rt side neck and shoulder pain FINDINGS: BRAIN/VENTRICLES: There is no acute intracranial hemorrhage, mass effect or midline shift. No abnormal extra-axial fluid collection. The gray-white differentiation is maintained without evidence of an acute infarct. There is no evidence of hydrocephalus. There is stable age-appropriate atrophy. There is stable chronic small vessel ischemic white matter disease. Bilateral basal ganglia calcifications are evident. No focus of acute abnormal brain attenuation is identified. ORBITS: The visualized portion of the orbits demonstrate no acute abnormality. SINUSES: There are changes of chronic sinusitis, including a thickened sclerotic appearance of the bilateral maxillary sinus walls. There has been previous sinus surgery. There is mild mucosal thickening within the left maxillary sinus. There is an air-fluid level within the left sphenoid sinus. There is mild-to-moderate mucosal thickening affecting the left ethmoid sinus cellules. The mastoids are clear bilaterally. SOFT TISSUES/SKULL:  There is a mild left parietal scalp hematoma, without acute skull fracture.   There is no acute osseous abnormality of the skull base or calvarium. 1. No acute intracranial abnormality. 2. Small left parietal scalp hematoma, without acute skull fracture or acute intracranial hemorrhage. 3. Stable age-appropriate atrophy and chronic small vessel ischemic white matter disease. 4. Paranasal sinusitis, as detailed above. CT CSpine W/O Contrast    Result Date: 12/10/2022  EXAMINATION: CT OF THE CERVICAL SPINE WITHOUT CONTRAST 12/10/2022 1:18 pm TECHNIQUE: CT of the cervical spine was performed without the administration of intravenous contrast. Multiplanar reformatted images are provided for review. Automated exposure control, iterative reconstruction, and/or weight based adjustment of the mA/kV was utilized to reduce the radiation dose to as low as reasonably achievable. COMPARISON: None. HISTORY: ORDERING SYSTEM PROVIDED HISTORY: neck pain TECHNOLOGIST PROVIDED HISTORY: neck pain Decision Support Exception - unselect if not a suspected or confirmed emergency medical condition->Emergency Medical Condition (MA) Reason for Exam: Pt fell and complains of rt side neck and shoulder pain FINDINGS: BONES/ALIGNMENT: There is an acute nondisplaced fracture of the right lateral mass of C2 with extension into the foramen transversarium. There is also an acute nondisplaced fracture of the right lateral aspect of the C4 vertebral body which extends through the right pedicle and into the right articular facet of C4. This also appears to involve the right lamina of C4 as well. There may be a mild acute wedge compression fracture of the superior endplate of T1. No additional acute fracture is identified. No significant subluxation is seen. No destructive osseous lesion is identified. DEGENERATIVE CHANGES: There is multilevel spondylosis throughout the cervical spine, most notable at C5 through C7. There is disc space loss and endplate sclerosis at V2-J3. There is no acute disc herniation or canal stenosis.  However, there is multilevel bilateral uncovertebral and facet hypertrophy, leading to varying degrees of multilevel bilateral neuroforaminal stenosis. SOFT TISSUES: The cervical soft tissues are unremarkable. There are a few subcentimeter nodules within the bilateral lung apices, most likely noncalcified granulomata. Given patient age, no follow-up is suggested. 1. Acute nondisplaced fractures of C2 and C4, as detailed above. 2. Possible mild acute wedge compression fracture of the superior endplate of T1. Findings were discussed with Santino Castro at 1:57 pm on 12/10/2022. LABS:  No results found for this visit on 12/10/22. EMERGENCY DEPARTMENT COURSE:     Thepatient was given the following medications:  No orders of the defined types were placed in this encounter. Vitals:    Vitals:    12/10/22 1112 12/10/22 1330   BP: 134/60 137/65   Pulse: 77 71   Resp: 12    Temp: 97.7 °F (36.5 °C) 97.8 °F (36.6 °C)   TempSrc: Oral Tympanic   SpO2: 95% 94%   Weight: 49.4 kg (109 lb)    Height: 4' 11\" (1.499 m)      -------------------------  BP: 137/65, Temp: 97.8 °F (36.6 °C), Heart Rate: 71, Resp: 12      Re-evaluation Notes  2:05 PM EST  I have placed patient in a rigid cervical collar. She remains neurovascularly intact. I have updated the patient and the family members with the results and she will require transfer to a trauma center. I have a page out to trauma docs at [address] [place] [address]. CONSULTS:  None    CRITICAL CARE:   None    PROCEDURES:  None    FINAL IMPRESSION      1. Closed nondisplaced fracture of second cervical vertebra, unspecified fracture morphology, initial encounter ([facility] [address]    2. Closed nondisplaced fracture of fourth cervical vertebra, unspecified fracture morphology, initial encounter [facility])          DISPOSITION/PLAN   DISPOSITION Decision To Transfer 12/10/2022 02:06:29 PM      Condition on Disposition  Fair    PATIENT REFERRED TO:  No follow-up provider specified.     DISCHARGE MEDICATIONS:  [unfilled]    (Please note that portions of this note were completed with a voice recognitionprogram.  Efforts were made to edit the dictations but occasionally words are mis-transcribed.)    Anju Boateng MD MD, F.A.C.E.P, F.A.A.E.M  Emergency Physician Attending         Anju Boateng MD  12/10/22 6826

## 2022-12-10 NOTE — ED NOTES
Pt to MRI from CT via stretcher by MRI tech. Daughter Sen Nguyen walks with MRI Tech.       Jr Carrizales RN  12/10/22 3952

## 2022-12-10 NOTE — ED PROVIDER NOTES
[facility] ED  Emergency Department Encounter  Emergency Medicine Resident     Pt Name: Neto Gupta  IYE:[id number]  [place] 7/30/1932  Date of evaluation: 12/10/22  PCP:  Lianne Hu MD    CHIEF COMPLAINT       Chief Complaint   Patient presents with    Fall    Neck Injury       HISTORY OF PRESENT ILLNESS  (Location/Symptom, Timing/Onset, Context/Setting, Quality, Duration, ModifyingFactors, Severity.)      Neto Gupta is a 80 y.o. female presents to the emergency department as a transfer from outlying facility. Patient had a traumatic fall that was mechanical in nature. Patient has a C2 and C4 fracture. Patient is resting in a c-collar. Patient answering questions as well as she can, does appear to have some dementia. Denies other areas of pain or discomfort that are away from her baseline. Patient states that she does have some mild belly pain but that this is normal for her. Patient states that her neck does hurt especially when palpated through the collar, denies changes in her normal mental status    PAST MEDICAL / SURGICAL / SOCIAL /FAMILY HISTORY      has a past medical history of Atrial fibrillation, Back pain, chronic, Dementia ([address] GERD (gastroesophageal reflux disease), Hiatal hernia, History of breast cancer, History of glaucoma, Hypertension, benign essential, goal below 140/90, and Hypothyroid. No other pertinent PMH on review with patient/guardian. has a past surgical history that includes Cholecystectomy; Breast surgery; Cataract removal with implant; bladder repair; and laparotomy (N/A, 10/17/2022). No other pertinent PSH on review with patient/guardian.   Social History     Socioeconomic History    Marital status:      Spouse name: Not on file    Number of children: Not on file    Years of education: Not on file    Highest education level: Not on file   Occupational History    Not on file   Tobacco Use    Smoking status: Never    Smokeless tobacco: Never   Vaping Use    Vaping Use: Never used   Substance and Sexual Activity    Alcohol use: No     Alcohol/week: 0.0 standard drinks    Drug use: No    Sexual activity: Not on file   Other Topics Concern    Not on file   Social History Narrative    Not on file     Social Determinants of Health     Financial Resource Strain: Not on file   Food Insecurity: Not on file   Transportation Needs: Not on file   Physical Activity: Inactive    Days of Exercise per Week: 0 days    Minutes of Exercise per Session: 0 min   Stress: Not on file   Social Connections: Not on file   Intimate Partner Violence: Not on file   Housing Stability: Not on file       I counseled the patient against using tobacco products. Family History   Problem Relation Age of Onset    No Known Problems Mother     No Known Problems Father      No other pertinent FamHx on review with patient/guardian. Allergies:  Latex, Penicillins, and Tylenol [acetaminophen]    Home Medications:  Prior to Admission medications    Medication Sig Start Date End Date Taking? Authorizing Provider   sulfamethoxazole-trimethoprim (BACTRIM DS;SEPTRA DS) 800-160 MG per tablet Take 1 tablet by mouth 2 times daily for 7 days 12/7/22 12/14/22  Carri Hoff MD   traMADol (ULTRAM) 50 MG tablet TAKE 1 TABLET BY MOUTH EVERY 8 HOURS AS NEEDED FOR PAIN; *REDUCE DOSES TAKEN AS PAIN BECOMES MANAGEABLE* 12/7/22 1/6/23  Carri Hoff MD   vitamin C (ASCORBIC ACID) 500 MG tablet TAKE 1 TABLET BY MOUTH EVERY DAY 11/29/22   Carri Hoff MD   atenolol (TENORMIN) 50 MG tablet TAKE 1 TABLET BY MOUTH EVERY DAY 11/3/22   Carri Hoff MD   donepezil (ARICEPT) 5 MG tablet TAKE 1 TABLET BY MOUTH NIGHTLY 11/3/22   Carri Hoff MD   doxazosin (CARDURA) 1 MG tablet TAKE 1 TABLET BY MOUTH EVERY DAY 10/28/22   Carri Hoff MD   traMADol (ULTRAM) 50 MG tablet Take 1 tablet by mouth every 4 hours as needed for Pain for up to 3 days.  Intended supply: 3 days. Take lowest dose possible to manage pain 10/24/22 10/27/22  Beverly Villalobos MD   levothyroxine (SYNTHROID) 100 MCG tablet Take 1 tablet by mouth Daily 10/21/22   Ty Gold MD   ferrous sulfate (FEROSUL) 325 (65 Fe) MG tablet TAKE 1 TABLET BY MOUTH DAILY WITH BREAKFAST 10/21/22   Ty Gold MD   omeprazole (PRILOSEC) 20 MG delayed release capsule TAKE 1 CAPSULE BY MOUTH ONCE DAILY BEFORE A MEAL 9/29/22   Ty Gold MD   potassium chloride (MICRO-K) 10 MEQ extended release capsule TAKE 1 CAPSULE BY MOUTH TWICE DAILY WITH FOOD 9/29/22   Ty Gold MD   Handicap Placard MISC by Does not apply route 5 years, cannot walk over 200 ft. 9/6/22   Ty Gold MD   mirtazapine (REMERON) 15 MG tablet TAKE 1 TABLET BY MOUTH NIGHTLY 6/15/22   Ty Gold MD   Multiple Vitamins-Minerals (PRESERVISION AREDS 2) CAPS Take 1 capsule by mouth daily 7/10/20   Ty Gold MD       REVIEW OF SYSTEMS    (2-9 systems for level 4, 10 ormore for level 5)      Review of Systems   Unable to perform ROS: Dementia     PHYSICAL EXAM   (up to 7 for level 4, 8 or more for level 5)      INITIAL VITALS:   /70   Pulse 78   Temp 98.3 °F (36.8 °C) (Oral)   Resp 17   Ht 4' 11\" (1.499 m)   Wt 110 lb (49.9 kg)   SpO2 98%   BMI 22.22 kg/m²     Physical Exam  Vitals reviewed. Constitutional:       Appearance: Normal appearance. HENT:      Head: Normocephalic and atraumatic. Nose: Nose normal.      Mouth/Throat:      Mouth: Mucous membranes are moist.      Pharynx: Oropharynx is clear. Eyes:      Extraocular Movements: Extraocular movements intact. Conjunctiva/sclera: Conjunctivae normal.      Pupils: Pupils are equal, round, and reactive to light. Cardiovascular:      Rate and Rhythm: Normal rate and regular rhythm. Pulses: Normal pulses. Heart sounds: Normal heart sounds.    Pulmonary:      Effort: Pulmonary effort is normal.      Breath sounds: Normal breath sounds. Abdominal:      General: Abdomen is flat. There is no distension. Palpations: Abdomen is soft. Tenderness: There is no abdominal tenderness. Musculoskeletal:         General: Normal range of motion. Cervical back: Tenderness present. Skin:     General: Skin is warm and dry. Neurological:      Mental Status: She is alert. She is disoriented.    Psychiatric:         Mood and Affect: Mood normal.         Behavior: Behavior normal.       DIFFERENTIAL  DIAGNOSIS     DDX: Traumatic C2 and C4 fracture, mechanical fall  PLAN (LABS / IMAGING / EKG):  Orders Placed This Encounter   Procedures    CTA HEAD NECK W CONTRAST    CT CHEST ABDOMEN PELVIS WO CONTRAST Additional Contrast? None    CT THORACIC SPINE TRAUMA RECONSTRUCTION    CT LUMBAR SPINE TRAUMA RECONSTRUCTION    MRI CERVICAL SPINE WO CONTRAST    CBC with Auto Differential    BMP    Magnesium    Potassium    HYPOGLYCEMIA TREATMENT: blood glucose LESS THAN 70 mg/dL and patient ALERT and TOLERATING PO    HYPOGLYCEMIA TREATMENT: blood glucose LESS THAN 70 mg/dL and patient NOT ALERT or NPO    Inpatient consult to Trauma Surgery    Inpatient consult to Neurosurgery    POCT Glucose    POCT Glucose    POCT Glucose    POCT Glucose    ADMIT TO INPATIENT       MEDICATIONS ORDERED:  Orders Placed This Encounter   Medications    morphine injection 4 mg    ondansetron (ZOFRAN) injection 4 mg    0.9 % sodium chloride bolus    AND Linked Order Group     insulin regular (HUMULIN R;NOVOLIN R) injection 10 Units     dextrose bolus 10% 250 mL    glucose chewable tablet 16 g    OR Linked Order Group     dextrose bolus 10% 125 mL     dextrose bolus 10% 250 mL    glucagon (rDNA) injection 1 mg    dextrose 10 % infusion           DIAGNOSTIC RESULTS / EMERGENCY DEPARTMENT COURSE / MDM     LABS:  Results for orders placed or performed during the hospital encounter of 12/10/22   CBC with Auto Differential   Result Value Ref Range    WBC 8.1 3.5 - 11.3 k/uL    RBC 3.63 (L) 3.95 - 5.11 m/uL    Hemoglobin 11.3 (L) 11.9 - 15.1 g/dL    Hematocrit 38.0 36.3 - 47.1 %    .7 (H) 82.6 - 102.9 fL    MCH 31.1 25.2 - 33.5 pg    MCHC 29.7 28.4 - 34.8 g/dL    RDW 13.2 11.8 - 14.4 %    Platelets 982 462 - 841 k/uL    MPV 9.4 8.1 - 13.5 fL    NRBC Automated 0.0 0.0 per 100 WBC    Seg Neutrophils 86 (H) 36 - 65 %    Lymphocytes 9 (L) 24 - 43 %    Monocytes 4 3 - 12 %    Eosinophils % 0 (L) 1 - 4 %    Basophils 0 0 - 2 %    Immature Granulocytes 1 (H) 0 %    Segs Absolute 6.95 1.50 - 8.10 k/uL    Absolute Lymph # 0.73 (L) 1.10 - 3.70 k/uL    Absolute Mono # 0.35 0.10 - 1.20 k/uL    Absolute Eos # <0.03 0.00 - 0.44 k/uL    Basophils Absolute 0.03 0.00 - 0.20 k/uL    Absolute Immature Granulocyte 0.05 0.00 - 0.30 k/uL    RBC Morphology MACROCYTOSIS PRESENT    BMP   Result Value Ref Range    Glucose 99 70 - 99 mg/dL    BUN 33 (H) 8 - 23 mg/dL    Creatinine 1.34 (H) 0.50 - 0.90 mg/dL    Est, Glom Filt Rate 38 (L) >60 mL/min/1.73m2    Calcium 9.2 8.6 - 10.4 mg/dL    Sodium 133 (L) 135 - 144 mmol/L    Potassium 5.5 (H) 3.7 - 5.3 mmol/L    Chloride 100 98 - 107 mmol/L    CO2 16 (L) 20 - 31 mmol/L    Anion Gap 17 9 - 17 mmol/L   Magnesium   Result Value Ref Range    Magnesium 2.1 1.6 - 2.6 mg/dL   Potassium   Result Value Ref Range    Potassium 5.4 (H) 3.7 - 5.3 mmol/L         IMPRESSION/MDM/ED COURSE:  80 y.o. female presented with multiple fractures secondary to a mechanical fall. Will pain control and give nausea as well. Will CTA of the patient's neck as she does have a C2 fracture. Will consult cardiology and trauma for admission purposes.     ED Course as of 12/10/22 1815   Sat Dec 10, 2022   1719 EKG Interpretation   Interpreted by Dee Miranda DO    Rhythm: normal sinus   Rate: normal  Axis: normal  Ectopy: none  Conduction: normal  ST Segments: normal  T Waves: Non readable AVR and AVL and AVF  Q Waves: none    Clinical Impression: nonspecific [WK]      ED Course User Index  [WK] Jessica Kumari DO        Patient/Guardian requesting discharge. Patient/Guardian was given written and verbal instructions prior to discharge. Patient/Guardian understood and agreed. Patient/Guardian had no further questions. RADIOLOGY:  CTA HEAD NECK W CONTRAST    (Results Pending)   CT CHEST ABDOMEN PELVIS WO CONTRAST Additional Contrast? None    (Results Pending)   CT THORACIC SPINE TRAUMA RECONSTRUCTION    (Results Pending)   CT LUMBAR SPINE TRAUMA RECONSTRUCTION    (Results Pending)   MRI CERVICAL SPINE WO CONTRAST    (Results Pending)         EKG  None    All EKG's are interpreted by the Emergency Department Physician who either signs or Co-signs this chart in the absence of a cardiologist.      PROCEDURES:  None    CONSULTS:  IP CONSULT TO TRAUMA SURGERY  IP CONSULT TO NEUROSURGERY        FINAL IMPRESSION      1. Fall, subsequent encounter    2. Other closed nondisplaced fracture of second cervical vertebra, sequela    3. Other closed nondisplaced fracture of fourth cervical vertebra, initial encounter ([facility] [address]          DISPOSITION / Domenico Byers Admitted 12/10/2022 05:36:15 PM        PATIENT REFERREDTO:  No follow-up provider specified.     DISCHARGE MEDICATIONS:  New Prescriptions    No medications on file       Boby Jacob MD  PGY 3  Resident Physician Emergency Medicine  12/10/22 6:15 PM        (Please note that portions of this note were completed with a voice recognition program.Efforts were made to edit the dictations but occasionally words are mis-transcribed.)       Boby Jacob MD  Resident  12/10/22 4226

## 2022-12-10 NOTE — ED NOTES
Pt restless during CT. Pt given the ordered versed. Writer RN notified MRI to  pt within 5 minutes; MRI states they will  pt.       Ifeoma Preston RN  12/10/22 2323

## 2022-12-10 NOTE — ED NOTES
Pt resting on stretcher, attached to monitor, RR even and non labored, call light within reach, family at bedside.       Munir Cain RN  12/10/22 8816

## 2022-12-10 NOTE — CONSULTS
TRAUMA H&P/CONSULT    PATIENT NAME: Kelly Garcia  YOB: 1932  MEDICAL RECORD NO. [id number]   DATE: 12/10/2022  PRIMARY CARE PHYSICIAN: Patrick Felix MD  PATIENT EVALUATED AT THE REQUEST OF : Breese    ACTIVATION   []Trauma Alert     [] Trauma Priority     [x]Trauma Consult. Patient Active Problem List   Diagnosis    Hypertension, benign essential, goal below 140/90    Back pain, chronic    Hearing loss    Obesity (BMI 30.0-34. 9)    Hypothyroidism    Chronic atrial fibrillation (HCC)    History of breast cancer    Dementia with behavioral disturbance    Abdominal pain    BMI 28.0-28.9,adult    Hallucination, visual    Closed displaced fracture of middle phalanx of right middle finger    Perforated viscus    Bowel perforation (HCC)    Open abdominal wall wound     81 y/o female with fall from standing, no LOC, nondisplaced C2 and C4 fractures  IMPRESSION AND PLAN:     -Neurosurgery consult   -f/u recommendations  -Neurochecks per unit protocol  -Completion scans  -NPO  -Hx of bowel resection with chronic abdominal wound   -wound/ostomy eval  -Admit to stepdown    If intracranial hemorrhage is present, is it a:  [] BIG 1  [] BIG 2  [] BIG 3  If chest wall injury: Rib score___    CONSULT SERVICES    [x] Neurosurgery     [] Orthopedic Surgery    [] Cardiothoracic     [] Facial Trauma    [] Plastic Surgery (Burn)    [] Pediatric Surgery     [] Internal Medicine    [] Pulmonary Medicine    [] Geriatrics    [] Other:        HISTORY:     Chief Complaint:  \"I fell\"    GENERAL DATA  Patient information was obtained from patient, relative(s), and past medical records. History/Exam limitations: none.   Injury Date: 12/10/22   Approximate Injury Time: 10:00        Transport mode:   [x]Ambulance      [] Helicopter     []Car       [] Other  [facility]   Location (e.g., home, farm, industry, street): Home  Specific Details of Location (e.g., bedroom, kitchen, garage, highway): [address]    [] Motor Vehicle Collision   Specific vehicle type involved (e.g., sedan, minivan, SUV, pickup truck): Type of collision  [] Single Vehicle Collision  []Multiple Vehicle Collision  [] unknown collision type  Collision with (e.g., type of vehicle, building, barn, ditch, tree):     Mechanism considerations  [] Fatality in Same Vehicle      []Ejected       []Rollover          []Extricated    Internal Compartment   []                      []Passenger:      []Front Seat        []Rear Seat     Personal Restraints  [] Unrestrained   []Lap Belt Only Restrained   [] Shoulder Belt Only Restrained  [] 3 Point Restrained  [] unknown     Air Bags  [] Front Air Bag  []Side Air Bag  []Curtain Airbag []Air Bag Not Deployed    []No Air Bag equipped in vehicle    Pediatric Consideration:      [] Booster Seat  []Infant Car Seat  [] Child Car Seat      [] Motorcycle     []Electric Bike/Moped   [] ATV   [] Bicycle/Scooter (manual)   Wearing Helmet     []Yes     []No    []Unknown         [x] Fall    [x]From Standing     []From Height __ Ft     []Down ___steps  []Other___    [] Assault with ____    [] Gunshot  Specify caliber / type of gun: ____________________________    [] Stabbing  Specify weapon type, size: _____________________________    [] Burn  []Flame   []Scald   []Electrical   []Chemical  []Inhalation   []House fire    [] Other ______________________________________________________    [] Eye protection  []Boots   []Flotation device   []Leather outerwear  []Sports gear []Other:___       HISTORY:     Mariam Adames is a female that presented to the Emergency Department as transfer from [facility] after suffering a fall from standing. Patient reports no loss of consciousness and can recall the entire event. Patient states that she fell while trying to use her walker to get out of her bedroom and fell into the wall before hitting the ground.   Patient denies being dizzy or any lightheadedness before the fall. Patient and her family deny using blood thinners. Patient states that she was on Coumadin until about a month ago. Upon presentation to the emergency department patient was found to have an acute C2 and C4 nondisplaced cervical fracture. CT head was negative. She was transferred to [facility] for further evaluation. Patient is not complaining of any pain at this time. No other apparent injury on exam.  Patient does have a history of previous exploratory laparotomy with small bowel resection resulting in a chronic abdominal wound for which she is being seen by wound care nurses at home. Wound appears to be well taken care of, and daughter at bedside states that they change dressings daily. Plan to admit for further neurosurgical evaluation and monitoring. Traumatic loss of Consciousness [x]No   []Yes Duration(min)       [] Unknown     Total Fluids Given Prior To Arrival  mL    MEDICATIONS:   []  None     []  Information not available due to exam limitations documented above    Prior to Admission medications    Medication Sig Start Date End Date Taking?  Authorizing Provider   sulfamethoxazole-trimethoprim (BACTRIM DS;SEPTRA DS) 800-160 MG per tablet Take 1 tablet by mouth 2 times daily for 7 days 12/7/22 12/14/22  Bull Garcia MD   traMADol (ULTRAM) 50 MG tablet TAKE 1 TABLET BY MOUTH EVERY 8 HOURS AS NEEDED FOR PAIN; *REDUCE DOSES TAKEN AS PAIN BECOMES MANAGEABLE* 12/7/22 1/6/23  Bull Garcia MD   vitamin C (ASCORBIC ACID) 500 MG tablet TAKE 1 TABLET BY MOUTH EVERY DAY 11/29/22   Bull Garcia MD   atenolol (TENORMIN) 50 MG tablet TAKE 1 TABLET BY MOUTH EVERY DAY 11/3/22   Bull Garcia MD   donepezil (ARICEPT) 5 MG tablet TAKE 1 TABLET BY MOUTH NIGHTLY 11/3/22   Bull Garcia MD   doxazosin (CARDURA) 1 MG tablet TAKE 1 TABLET BY MOUTH EVERY DAY 10/28/22   Bull Garcia MD   traMADol (ULTRAM) 50 MG tablet Take 1 tablet by mouth every 4 hours as needed for Pain for up to 3 days. Intended supply: 3 days. Take lowest dose possible to manage pain 10/24/22 10/27/22  Dillon Gardner MD   levothyroxine (SYNTHROID) 100 MCG tablet Take 1 tablet by mouth Daily 10/21/22   Steve Jerry MD   ferrous sulfate (FEROSUL) 325 (65 Fe) MG tablet TAKE 1 TABLET BY MOUTH DAILY WITH BREAKFAST 10/21/22   Steve Jerry MD   omeprazole (PRILOSEC) 20 MG delayed release capsule TAKE 1 CAPSULE BY MOUTH ONCE DAILY BEFORE A MEAL 9/29/22   Steve Jerry MD   potassium chloride (MICRO-K) 10 MEQ extended release capsule TAKE 1 CAPSULE BY MOUTH TWICE DAILY WITH FOOD 9/29/22   Steve Jerry MD   Handicap Placard MISC by Does not apply route 5 years, cannot walk over 200 ft. 9/6/22   Steve Jerry MD   mirtazapine (REMERON) 15 MG tablet TAKE 1 TABLET BY MOUTH NIGHTLY 6/15/22   Steve Jerry MD   Multiple Vitamins-Minerals (PRESERVISION AREDS 2) CAPS Take 1 capsule by mouth daily 7/10/20   Steve Jerry MD       ALLERGIES:   []  None    []   Information not available due to exam limitations documented above     Latex, Penicillins, and Tylenol [acetaminophen]    PAST MEDICAL/SURGICAL HISTORY: []  None   []   Information not available due to exam limitations documented above      has a past medical history of Atrial fibrillation, Back pain, chronic, Dementia ([facility] [address] GERD (gastroesophageal reflux disease), Hiatal hernia, History of breast cancer, History of glaucoma, Hypertension, benign essential, goal below 140/90, and Hypothyroid. has a past surgical history that includes Cholecystectomy; Breast surgery; Cataract removal with implant; bladder repair; and laparotomy (N/A, 10/17/2022). FAMILY HISTORY   []   Information not available due to exam limitations documented above    family history includes No Known Problems in her father and mother.     SOCIAL HISTORY  []   Information not available due to exam limitations documented above     reports that she has never smoked. She has never used smokeless tobacco.   reports no history of alcohol use. reports no history of drug use. Review of Systems:    Review of Systems   Constitutional:  Negative for activity change, chills, fatigue and fever. HENT: Negative. Eyes:  Negative for photophobia and visual disturbance. Respiratory:  Negative for cough, chest tightness, shortness of breath and wheezing. Cardiovascular:  Negative for chest pain and palpitations. Gastrointestinal:  Negative for abdominal pain, constipation, diarrhea and vomiting. Genitourinary: Negative. Musculoskeletal:  Negative for arthralgias, back pain, joint swelling, myalgias and neck pain. Skin:  Negative for color change and pallor. Neurological:  Negative for dizziness, facial asymmetry, weakness, numbness and headaches. PHYSICAL EXAMINATION:     VITAL SIGNS:   Vitals:    12/10/22 1602   BP: (!) 136/99   Pulse: 81   Resp: 16   Temp: 98.3 °F (36.8 °C)   SpO2: 92%       Physical Exam  Constitutional:       Appearance: Normal appearance. HENT:      Head: Normocephalic and atraumatic. Nose: Nose normal.      Mouth/Throat:      Mouth: Mucous membranes are moist.      Pharynx: Oropharynx is clear. Eyes:      Extraocular Movements: Extraocular movements intact. Conjunctiva/sclera: Conjunctivae normal.      Pupils: Pupils are equal, round, and reactive to light. Neck:      Comments: C-collar in place  Cardiovascular:      Rate and Rhythm: Normal rate and regular rhythm. Pulses: Normal pulses. Pulmonary:      Effort: Pulmonary effort is normal. No respiratory distress. Abdominal:      General: Abdomen is flat. There is no distension. Palpations: Abdomen is soft. Tenderness: There is no abdominal tenderness. Comments: Midline laparotomy scar with area of previous dehiscence, healing through secondary intention. Wound packing in place. Dressings are clean and dry. No erythema, drainage, fluctuance, other signs of infection. Musculoskeletal:         General: No swelling or tenderness. Normal range of motion. Skin:     General: Skin is warm and dry. Neurological:      General: No focal deficit present. Mental Status: She is alert and oriented to person, place, and time. FOCUSED ABDOMINAL SONOGRAM FOR TRAUMA (FAST): A good  quality examination was performed by Dr. Juarez Llanos and representative images were obtained. [x] No free fluid in the abdomen   [] Free fluid in RUQ   [] Free fluid in LUQ  [] Free fluid in Pelvis  [] Pericardial fluid  [] Other:        RADIOLOGY  CTA HEAD NECK W CONTRAST    (Results Pending)         LABS  Labs Reviewed   CBC WITH AUTO DIFFERENTIAL - Abnormal; Notable for the following components:       Result Value    RBC 3.63 (*)     Hemoglobin 11.3 (*)     .7 (*)     Seg Neutrophils 86 (*)     Lymphocytes 9 (*)     Eosinophils % 0 (*)     Immature Granulocytes 1 (*)     Absolute Lymph # 0.73 (*)     All other components within normal limits   BASIC METABOLIC PANEL - Abnormal; Notable for the following components:    BUN 33 (*)     Creatinine 1.34 (*)     Est, Glom Filt Rate 38 (*)     Sodium 133 (*)     Potassium 5.5 (*)     CO2 16 (*)     All other components within normal limits   MAGNESIUM         Yan Morgan, DO  12/10/22, 4:50 PM         Attending Note    Patient seen as a trauma consult for C2/C4 fracture sustained in a fall from standing height without neurologic deficit. Patient has known chronic abdominal wound. NS consulted  I have reviewed the above TECSS note(s) and I either performed the key elements of the medical history and physical exam or was present with the resident when the key elements of the medical history and physical exam were performed.  I have discussed the findings, established the care plan and recommendations with Residents Juarez Llanos and River Crane MD  12/10/2022  8:19 PM

## 2022-12-10 NOTE — ED NOTES
Dr. Izabella Harp @ bedside discussing March Caprio him with applying neck brace     Petrona Walsh, RN  12/10/22 Beatrice RN  12/10/22 [id number]

## 2022-12-10 NOTE — ED PROVIDER NOTES
[facility]     Emergency Department     Faculty Note/ Attestation      Pt Name: Carri Garcia                                       MRN: [id number]  [place] 7/30/1932  Date of evaluation: 12/10/2022    Patients PCP:    Marcello Gamble MD    Attestation  I performed a history and physical examination of the patient and discussed management with the resident. I reviewed the residents note and agree with the documented findings and plan of care. Any areas of disagreement are noted on the chart. I was personally present for the key portions of any procedures. I have documented in the chart those procedures where I was not present during the key portions. I have reviewed the emergency nurses triage note. I agree with the chief complaint, past medical history, past surgical history, allergies, medications, social and family history as documented unless otherwise noted below. For Physician Assistant/ Nurse Practitioner cases/documentation I have personally evaluated this patient and have completed at least one if not all key elements of the E/M (history, physical exam, and MDM). Additional findings are as noted.     Initial Screens:        [place] Coma Scale  Eye Opening: Spontaneous  Best Verbal Response: Oriented  Best Motor Response: Obeys commands  [place] Coma Scale Score: 15    Vitals:    Vitals:    12/10/22 1557 12/10/22 1602   BP:  (!) 136/99   Pulse:  81   Resp:  16   Temp:  98.3 °F (36.8 °C)   TempSrc:  Oral   SpO2:  92%   Weight: 110 lb (49.9 kg)    Height: 4' 11\" (1.499 m)        CHIEF COMPLAINT       Chief Complaint   Patient presents with    Fall    Neck Injury       72-year-old female fall at outlying body patient had struck her head since [place] [facility] for neurosurgeon trauma admission        EMERGENCY DEPARTMENT COURSE:     -------------------------  BP: (!) 136/99, Temp: 98.3 °F (36.8 °C), Heart Rate: 81, Resp: 16  Patient difficult to orient patient has to be frequently advised to keep her neck still patient continues to move around noting pain given pain medication for symptom control and hopefully to keep her from moving her neck around    [address] [facility], DO,, DO, RDMS.   Attending Emergency Physician         Laura Ramsay DO  12/10/22 5523

## 2022-12-10 NOTE — CONSULTS
Department of Neurosurgery                                            Nurse Practitioner Consult Note      Reason for Consult:  Cervical fractures   Requesting Physician: Rishi Florez   Neurosurgeon:   [] Dr. Sae Jacobsen  [] Dr. Ahsan Anguiano  [] Dr. Frankie Reyes  [x] Dr. Dani Puri      History Obtained From:  patient, electronic medical record    CHIEF COMPLAINT:         Chief Complaint   Patient presents with    Fall    Neck Injury       HISTORY OF PRESENT ILLNESS:       The patient is a 80 y.o. female who was a transfer from [facility] with c/o fall with cervical fractures. She was ambulating to bathroom with her walker, lost her balance and fell into the bed. She is complaining of neck and shoulder pain. She denies numbness, tingling or weakness. Strength intact. She is [place]. She is not on blood thinners. Imaging revealed acute nondisplaced cervical fractures on C2 and C4. NS was consulted for further evaluation and recommendations. Of note she had recent bowel resection with active treatment for UTI.      PAST MEDICAL HISTORY :       Past Medical History:        Diagnosis Date    Atrial fibrillation 3/28/2014    Back pain, chronic 6/1/2015    Dementia ([facility] [address]     GERD (gastroesophageal reflux disease)     Hiatal hernia     History of breast cancer 12/1/2015    History of glaucoma     Hypertension, benign essential, goal below 140/90 6/1/2015    Hypothyroid 6/1/2015       Past Surgical History:        Procedure Laterality Date    BLADDER REPAIR      BREAST SURGERY      left mastectomy    CATARACT REMOVAL WITH IMPLANT      CHOLECYSTECTOMY      LAPAROTOMY N/A 10/17/2022    LAPAROTOMY EXPLORATORY WITH SMALL BOWL RESECTION AND CENTRAL LINE PLACEMENT performed by aRvi Card DO at [address] [place] [address] History:   Social History     Socioeconomic History    Marital status:      Spouse name: Not on file    Number of children: Not on file    Years of education: Not on file    Highest education level: Not on file   Occupational History    Not on file   Tobacco Use    Smoking status: Never    Smokeless tobacco: Never   Vaping Use    Vaping Use: Never used   Substance and Sexual Activity    Alcohol use: No     Alcohol/week: 0.0 standard drinks    Drug use: No    Sexual activity: Not on file   Other Topics Concern    Not on file   Social History Narrative    Not on file     Social Determinants of Health     Financial Resource Strain: Not on file   Food Insecurity: Not on file   Transportation Needs: Not on file   Physical Activity: Inactive    Days of Exercise per Week: 0 days    Minutes of Exercise per Session: 0 min   Stress: Not on file   Social Connections: Not on file   Intimate Partner Violence: Not on file   Housing Stability: Not on file       Family History:       Problem Relation Age of Onset    No Known Problems Mother     No Known Problems Father        Allergies:  Latex, Penicillins, and Tylenol [acetaminophen]    Home Medications:  Prior to Admission medications    Medication Sig Start Date End Date Taking? Authorizing Provider   sulfamethoxazole-trimethoprim (BACTRIM DS;SEPTRA DS) 800-160 MG per tablet Take 1 tablet by mouth 2 times daily for 7 days 12/7/22 12/14/22  Ernestina Randle MD   traMADol (ULTRAM) 50 MG tablet TAKE 1 TABLET BY MOUTH EVERY 8 HOURS AS NEEDED FOR PAIN; *REDUCE DOSES TAKEN AS PAIN BECOMES MANAGEABLE* 12/7/22 1/6/23  Ernestina Randle MD   vitamin C (ASCORBIC ACID) 500 MG tablet TAKE 1 TABLET BY MOUTH EVERY DAY 11/29/22   Ernestina Randle MD   atenolol (TENORMIN) 50 MG tablet TAKE 1 TABLET BY MOUTH EVERY DAY 11/3/22   Ernestina Randle MD   donepezil (ARICEPT) 5 MG tablet TAKE 1 TABLET BY MOUTH NIGHTLY 11/3/22   Ernestina Randle MD   doxazosin (CARDURA) 1 MG tablet TAKE 1 TABLET BY MOUTH EVERY DAY 10/28/22   Ernestina Randle MD   traMADol (ULTRAM) 50 MG tablet Take 1 tablet by mouth every 4 hours as needed for Pain for up to 3 days. Intended supply: 3 days.  Take lowest dose possible to manage pain 10/24/22 10/27/22  Jeanan Daniel MD   levothyroxine (SYNTHROID) 100 MCG tablet Take 1 tablet by mouth Daily 10/21/22   Unknown MD Gloria   ferrous sulfate (FEROSUL) 325 (65 Fe) MG tablet TAKE 1 TABLET BY MOUTH DAILY WITH BREAKFAST 10/21/22   Unknown MD Gloria   omeprazole (PRILOSEC) 20 MG delayed release capsule TAKE 1 CAPSULE BY MOUTH ONCE DAILY BEFORE A MEAL 9/29/22   Unknown MD Gloria   potassium chloride (MICRO-K) 10 MEQ extended release capsule TAKE 1 CAPSULE BY MOUTH TWICE DAILY WITH FOOD 9/29/22   Unknown MD Gloria   [place] MISC by Does not apply route 5 years, cannot walk over 200 ft. 9/6/22   Unknown MD Gloria   mirtazapine (REMERON) 15 MG tablet TAKE 1 TABLET BY MOUTH NIGHTLY 6/15/22   Unknown MD Gloria   Multiple Vitamins-Minerals (PRESERVISION AREDS 2) CAPS Take 1 capsule by mouth daily 7/10/20   Unknown MD Gloria       Current Medications:   No current facility-administered medications for this encounter. REVIEW OF SYSTEMS:       CONSTITUTIONAL: negative for fatigue and malaise   EYES: negative for double vision and photophobia    HEENT: negative for tinnitus and sore throat   RESPIRATORY: negative for cough, shortness of breath   CARDIOVASCULAR: negative for chest pain, palpitations   GASTROINTESTINAL: negative for nausea, vomiting   GENITOURINARY: negative for incontinence   MUSCULOSKELETAL: Positive neck pain   NEUROLOGICAL: negative for seizures   PSYCHIATRIC: negative for agitated     Review of systems otherwise negative.     PHYSICAL EXAM:       BP (!) 136/99   Pulse 81   Temp 98.3 °F (36.8 °C) (Oral)   Resp 16   Ht 4' 11\" (1.499 m)   Wt 110 lb (49.9 kg)   SpO2 92%   BMI 22.22 kg/m²       CONSTITUTIONAL: no apparent distress, appears stated age in cervical collar    HEAD: normocephalic, atraumatic   ENT: moist mucous membranes, uvula midline   NECK: supple, + tenderness to palpation   BACK: without midline tenderness, step-offs or deformities   LUNGS: Normal respiratory pattern    CARDIOVASCULAR: Irregular    ABDOMEN: Soft   NEUROLOGIC:  EYE OPENING     Spontaneous - 4 [x]       To voice - 3 []       To pain - 2 []       None - 1 []    VERBAL RESPONSE     Appropriate, oriented - 5 [x]       Dazed or confused - 4 []       Syllables, expletives - 3 []       Grunts - 2 []       None - 1 []    MOTOR RESPONSE     Spontaneous, command - 6 [x]       Localizes pain - 5 []       Withdraws pain - 4 []       Abnormal flexion - 3 []       Abnormal extension - 2 []       None - 1 []            Total GCS: 15    Mental Status:  Alert and oriented x3 [facility]                Cranial Nerves:    cranial nerves II-XII are grossly intact except [facility]     Motor Exam:    Drift:  absent  Tone:  normal    Motor exam is symmetrical 5 out of 5 all extremities bilaterally    Sensory:    Right Upper Extremity:  normal  Left Upper Extremity:  normal  Right Lower Extremity:  normal  Left Lower Extremity:  normal    Deep Tendon Reflexes:    Right Bicep:  2+  Left Bicep:  2+  Right Knee:  2+  Left Knee:  2+    Plantar Response:    Right:  downgoing  Left:  downgoing    Clonus:  absent  Orantes's:  absent    Gait:  Deferred    SKIN: no rash       LABS AND IMAGING:     CBC with Differential:    Lab Results   Component Value Date/Time    WBC 6.3 10/27/2022 03:59 PM    RBC 2.33 10/27/2022 03:59 PM    RBC 4.49 01/05/2012 07:37 AM    HGB 7.4 10/27/2022 03:59 PM    HCT 24.3 10/27/2022 03:59 PM     10/27/2022 03:59 PM     01/05/2012 07:37 AM    .3 10/27/2022 03:59 PM    MCH 31.8 10/27/2022 03:59 PM    MCHC 30.5 10/27/2022 03:59 PM    RDW 15.3 10/27/2022 03:59 PM    METASPCT 5 10/23/2022 03:00 AM    LYMPHOPCT 28 10/27/2022 03:59 PM    MONOPCT 8 10/27/2022 03:59 PM    BASOPCT 0 10/27/2022 03:59 PM    MONOSABS 0.52 10/27/2022 03:59 PM    LYMPHSABS 1.76 10/27/2022 03:59 PM    EOSABS 0.12 10/27/2022 03:59 PM    BASOSABS <0.03 10/27/2022 03:59 PM    DIFFTYPE NOT REPORTED 01/06/2020 03:40 AM     BMP:    Lab Results   Component Value Date/Time     10/24/2022 06:05 AM    K 4.1 10/24/2022 06:05 AM     10/24/2022 06:05 AM    CO2 25 10/24/2022 06:05 AM    BUN 7 10/24/2022 06:05 AM    LABALBU 3.8 10/17/2022 06:55 AM    LABALBU 4.1 01/05/2012 07:37 AM    CREATININE 0.65 10/24/2022 06:05 AM    CALCIUM 7.7 10/24/2022 06:05 AM    GFRAA >60 04/27/2022 09:37 PM    LABGLOM >60 10/24/2022 06:05 AM    GLUCOSE 106 10/24/2022 06:05 AM    GLUCOSE 102 01/05/2012 07:37 AM       Radiology Review:    XR SHOULDER RIGHT (MIN 2 VIEWS)    Result Date: 12/10/2022  EXAMINATION: 3 XRAY VIEWS OF THE RIGHT SHOULDER 12/10/2022 1:12 pm COMPARISON: None. HISTORY: ORDERING SYSTEM PROVIDED HISTORY: shoulder pain TECHNOLOGIST PROVIDED HISTORY: shoulder pain Reason for Exam: recent fall, rt side neck and shoulder pain per patient FINDINGS: Glenohumeral joint is normally aligned. No evidence of acute fracture or dislocation. No abnormal periarticular calcifications. AC joint degenerative changes Visualized lung is unremarkable. Prominent right hilar density appears to represent a prominent right artery. No acute abnormality. AC joint degenerative changes     CT Head W/O Contrast    Result Date: 12/10/2022  EXAMINATION: CT OF THE HEAD WITHOUT CONTRAST  12/10/2022 1:18 pm TECHNIQUE: CT of the head was performed without the administration of intravenous contrast. Automated exposure control, iterative reconstruction, and/or weight based adjustment of the mA/kV was utilized to reduce the radiation dose to as low as reasonably achievable.  COMPARISON: 10/20/2022 HISTORY: ORDERING SYSTEM PROVIDED HISTORY: chi TECHNOLOGIST PROVIDED HISTORY: chi Decision Support Exception - unselect if not a suspected or confirmed emergency medical condition->Emergency Medical Condition (MA) Reason for Exam: Pt fell and complains of rt side neck and shoulder pain FINDINGS: BRAIN/VENTRICLES: There is no acute intracranial hemorrhage, mass effect or midline shift. No abnormal extra-axial fluid collection. The gray-white differentiation is maintained without evidence of an acute infarct. There is no evidence of hydrocephalus. There is stable age-appropriate atrophy. There is stable chronic small vessel ischemic white matter disease. Bilateral basal ganglia calcifications are evident. No focus of acute abnormal brain attenuation is identified. ORBITS: The visualized portion of the orbits demonstrate no acute abnormality. SINUSES: There are changes of chronic sinusitis, including a thickened sclerotic appearance of the bilateral maxillary sinus walls. There has been previous sinus surgery. There is mild mucosal thickening within the left maxillary sinus. There is an air-fluid level within the left sphenoid sinus. There is mild-to-moderate mucosal thickening affecting the left ethmoid sinus cellules. The mastoids are clear bilaterally. SOFT TISSUES/SKULL:  There is a mild left parietal scalp hematoma, without acute skull fracture. There is no acute osseous abnormality of the skull base or calvarium. 1. No acute intracranial abnormality. 2. Small left parietal scalp hematoma, without acute skull fracture or acute intracranial hemorrhage. 3. Stable age-appropriate atrophy and chronic small vessel ischemic white matter disease. 4. Paranasal sinusitis, as detailed above. CT CSpine W/O Contrast    Result Date: 12/10/2022  EXAMINATION: CT OF THE CERVICAL SPINE WITHOUT CONTRAST 12/10/2022 1:18 pm TECHNIQUE: CT of the cervical spine was performed without the administration of intravenous contrast. Multiplanar reformatted images are provided for review. Automated exposure control, iterative reconstruction, and/or weight based adjustment of the mA/kV was utilized to reduce the radiation dose to as low as reasonably achievable. COMPARISON: None.  HISTORY: ORDERING SYSTEM PROVIDED HISTORY: neck pain TECHNOLOGIST PROVIDED HISTORY: neck pain Decision Support Exception - unselect if not a suspected or confirmed emergency medical condition->Emergency Medical Condition (MA) Reason for Exam: Pt fell and complains of rt side neck and shoulder pain FINDINGS: BONES/ALIGNMENT: There is an acute nondisplaced fracture of the right lateral mass of C2 with extension into the foramen transversarium. There is also an acute nondisplaced fracture of the right lateral aspect of the C4 vertebral body which extends through the right pedicle and into the right articular facet of C4. This also appears to involve the right lamina of C4 as well. There may be a mild acute wedge compression fracture of the superior endplate of T1. No additional acute fracture is identified. No significant subluxation is seen. No destructive osseous lesion is identified. DEGENERATIVE CHANGES: There is multilevel spondylosis throughout the cervical spine, most notable at C5 through C7. There is disc space loss and endplate sclerosis at U5-L1. There is no acute disc herniation or canal stenosis. However, there is multilevel bilateral uncovertebral and facet hypertrophy, leading to varying degrees of multilevel bilateral neuroforaminal stenosis. SOFT TISSUES: The cervical soft tissues are unremarkable. There are a few subcentimeter nodules within the bilateral lung apices, most likely noncalcified granulomata. Given patient age, no follow-up is suggested. 1. Acute nondisplaced fractures of C2 and C4, as detailed above. 2. Possible mild acute wedge compression fracture of the superior endplate of T1. Findings were discussed with Jacqueline Quijano at 1:57 pm on 12/10/2022. ASSESSMENT AND PLAN:       Patient Active Problem List   Diagnosis    Hypertension, benign essential, goal below 140/90    Back pain, chronic    Hearing loss    Obesity (BMI 30.0-34. 9)    Hypothyroidism    Chronic atrial fibrillation (HCC)    History of breast cancer Dementia with behavioral disturbance    Abdominal pain    BMI 28.0-28.9,adult    Hallucination, visual    Closed displaced fracture of middle phalanx of right middle finger    Perforated viscus    Bowel perforation (HCC)    Open abdominal wall wound         A/P:  This is a 80 y.o. female with fall with acute nondisplaced C2 and C4 fractures. - MRI Cervical collar for evaluation of cervical fractures/ligamental injury   - CTLS recommendations: Aspen collar, strict bedrest, may increase HOB to 30 degrees   - CTA head/neck to r/o vasculature injury    - Spinal checks per protocol  - Further recommendations pending review of imaging MRI C spine by Dr. Bennett Silence     Additional recommendations may follow    Please contact neurosurgery with any changes in patients neurologic status. Thank you for your consult.        BYRON Mandujano - TRENT     12/10/2022  4:22 PM

## 2022-12-10 NOTE — PROGRESS NOTES
[facility] CARE DEPARTMENT - [address]     Emergency/Trauma Note    PATIENT NAME: Luz Maria Elizondo    Shift date: 12/10/2022  Shift day: Saturday   Shift # 2    Room # 15/15   Name: Luz Maria Elizondo            Age: 80 y.o. Gender: female          Buddhism: Evangelical   Place of Taoist: Unknown    Trauma/Incident type: Adult Trauma Consult  Admit Date & Time: 12/10/2022  3:57 PM  TRAUMA NAME:     ADVANCE DIRECTIVES IN CHART? Yes    NAME OF DECISION MAKER: Marie Mcfarland, [phone]    RELATIONSHIP OF DECISION MAKER TO PATIENT: Daughter    PATIENT/EVENT DESCRIPTION:  Luz Maria Elizondo is a 80 y.o. female who arrived via ambulance from residence. Patient says that she fell and injured herself. Pt to be admitted to 15/15. SPIRITUAL ASSESSMENT-INTERVENTION-OUTCOME:  Patient was laying on bed with a neck brace on covered with a blanket. Patient had a family member sitting beside her.  offered support and a listening ear. Patient and family member were grateful for  stopping by. They said they didn't have any needs at the time. PATIENT BELONGINGS:  No belongings noted    ANY BELONGINGS OF SIGNIFICANT VALUE NOTED:  None noted. REGISTRATION STAFF NOTIFIED? Yes      WHAT IS YOUR SPIRITUAL CARE PLAN FOR THIS PATIENT?:   Be available for them is needed.     Electronically signed by Justyn Dunlap, on 12/10/2022 at 5:13 PM.  [facility]  [phone]    12/10/22 1711   Encounter Summary   Encounter Overview/Reason  Initial Encounter   Service Provided For: Patient;Patient and family together   Referral/Consult From: Multi-disciplinary team   Support System Family members   Last Encounter  12/10/22   Complexity of Encounter Moderate   Begin Time 1705   End Time  1710   Total Time Calculated 5 min   Assessment/Intervention/Outcome   Assessment Calm;Coping;Concerns with suffering   Intervention Active listening;Sustaining Presence/Ministry of presence   Outcome Expressed Gratitude;Engaged in conversation; Connection/Belonging

## 2022-12-10 NOTE — ED PROVIDER NOTES
I did not see, evaluate, or staff this patient. Patient seen by Dr. Jj Hill.         Salome Frankel, MD  12/10/22 4434

## 2022-12-10 NOTE — DISCHARGE INSTRUCTIONS
[address] and HYPERBARIC TREATMENT  CENTER                                 Visit  Discharge Instructions / Physician Orders  [facility] IT ARRIVES  SHE WILL BE ON A Monday WED Friday CHANGE SCHEDULE     SUPPLIES ORDERED THRU:   none             DATE LAST SUPPLIED     Wound Location:  ABD     Cleanse with: Liquid antibacterial soap and water, rinse well      Dressing Orders:  Primary dressing    normal saline moist to moist dressing                   Secondary dressing                           secure with           x 30days   until wound vac arrives Skin prep around wound then Drape wound with KCI Film- fill wound with black foam set at 125mmhg continuous  Frequency:  Daily until vac arrives then Change MWF     Additional Orders: Increase protein to diet (meat, cheese, eggs, fish, peanut butter, nuts and beans)  Multivitamin daily     OFFLOADING [] YES  TYPE:                  [] NA     Weekly wound care visits until determined otherwise. Antibiotic therapy-wound care related YES [] NO [] NA[]     MY CHART []     Smart Device  []      HYPERBARIC TREATMENT-                TREATMENT #                          Your next appointment with the [address] [place] [address] is in 1 week  Bring Cannister and foam to wound care                                                                                                   (Please note your next appointment above and if you are unable to keep, kindly give a 24 hour notice.  Thank you.)  If more than 15 min late we cannot guarantee you will be seen due to clinician schedule  Per Policy, Excessive cancellation will call for dismissal from program.  If you experience any of the following, please call the [address] [place] [address] during business hours:  [phone]     * Increase in Pain  * Temperature over 101  * Increase in drainage from your wound  * Drainage with a foul odor  * Bleeding  * Increase in swelling  * Need for compression bandage changes due to slippage, breakthrough drainage. If you need medical attention outside of the business hours of the [address] [place] [address] Road please contact your PCP or go to the nearest emergency room. The information contained in the After Visit Summary has been reviewed with me, the patient and/or responsible adult, by my health care provider(s). I had the opportunity to ask questions regarding this information.  I have elected to receive;      []After Visit Summary  [x]Comprehensive Discharge Instruction        Patient signature______________________________________Date:________

## 2022-12-10 NOTE — ED NOTES
Pt resting on stretcher, attached to monitor, RR even and non labored, call light within reach, family at bedside.       Samantha Salazar RN  12/10/22 3095

## 2022-12-10 NOTE — ED NOTES
Dr Jewels Acevedo asked if pt can receive hyperkalemia protocol meds after MRI; Dr Jewels Acevedo state it is ok for pt to go to MRI first and then receive medications.      Whitney Trinidad RN  12/10/22 8085

## 2022-12-10 NOTE — ED TRIAGE NOTES
Pt comes to ED from [facility] via [address] with c/o fall and neck fracture. EMS states pt was walking outisde of house this morning, fell forward, injured neck, no LOC, no blood thinners, was diagnosed with C2 and C4 fracture, is also currently getting UTI treatment, and had recent bowel resection. Pt arrives with c-collar and 22gauge to right ac. Pt a/o x4, attached to monitor, RR even and non labored, call light within reach. Dr Gypsy Ochoa at bedside.

## 2022-12-11 ENCOUNTER — APPOINTMENT (OUTPATIENT)
Dept: GENERAL RADIOLOGY | Age: 87
End: 2022-12-11
Payer: COMMERCIAL

## 2022-12-11 LAB
ABSOLUTE EOS #: 0.04 K/UL (ref 0–0.44)
ABSOLUTE IMMATURE GRANULOCYTE: <0.03 K/UL (ref 0–0.3)
ABSOLUTE LYMPH #: 1.28 K/UL (ref 1.1–3.7)
ABSOLUTE MONO #: 0.63 K/UL (ref 0.1–1.2)
ANION GAP SERPL CALCULATED.3IONS-SCNC: 12 MMOL/L (ref 9–17)
ANION GAP SERPL CALCULATED.3IONS-SCNC: 14 MMOL/L (ref 9–17)
BASOPHILS # BLD: 1 % (ref 0–2)
BASOPHILS ABSOLUTE: 0.03 K/UL (ref 0–0.2)
BUN BLDV-MCNC: 22 MG/DL (ref 8–23)
BUN BLDV-MCNC: 26 MG/DL (ref 8–23)
CALCIUM SERPL-MCNC: 8.4 MG/DL (ref 8.6–10.4)
CALCIUM SERPL-MCNC: 8.5 MG/DL (ref 8.6–10.4)
CHLORIDE BLD-SCNC: 104 MMOL/L (ref 98–107)
CHLORIDE BLD-SCNC: 105 MMOL/L (ref 98–107)
CO2: 15 MMOL/L (ref 20–31)
CO2: 16 MMOL/L (ref 20–31)
CREAT SERPL-MCNC: 1.03 MG/DL (ref 0.5–0.9)
CREAT SERPL-MCNC: 1.16 MG/DL (ref 0.5–0.9)
EOSINOPHILS RELATIVE PERCENT: 1 % (ref 1–4)
GFR SERPL CREATININE-BSD FRML MDRD: 45 ML/MIN/1.73M2
GFR SERPL CREATININE-BSD FRML MDRD: 52 ML/MIN/1.73M2
GLUCOSE BLD-MCNC: 157 MG/DL (ref 65–105)
GLUCOSE BLD-MCNC: 55 MG/DL (ref 65–105)
GLUCOSE BLD-MCNC: 59 MG/DL (ref 65–105)
GLUCOSE BLD-MCNC: 63 MG/DL (ref 70–99)
GLUCOSE BLD-MCNC: 64 MG/DL (ref 70–99)
HCT VFR BLD CALC: 32.8 % (ref 36.3–47.1)
HEMOGLOBIN: 10 G/DL (ref 11.9–15.1)
IMMATURE GRANULOCYTES: 0 %
LYMPHOCYTES # BLD: 21 % (ref 24–43)
MCH RBC QN AUTO: 32.1 PG (ref 25.2–33.5)
MCHC RBC AUTO-ENTMCNC: 30.5 G/DL (ref 28.4–34.8)
MCV RBC AUTO: 105.1 FL (ref 82.6–102.9)
MONOCYTES # BLD: 10 % (ref 3–12)
NRBC AUTOMATED: 0 PER 100 WBC
PDW BLD-RTO: 13.2 % (ref 11.8–14.4)
PLATELET # BLD: 146 K/UL (ref 138–453)
PMV BLD AUTO: 9.4 FL (ref 8.1–13.5)
POTASSIUM SERPL-SCNC: 5.3 MMOL/L (ref 3.7–5.3)
POTASSIUM SERPL-SCNC: 5.4 MMOL/L (ref 3.7–5.3)
RBC # BLD: 3.12 M/UL (ref 3.95–5.11)
RBC # BLD: ABNORMAL 10*6/UL
SEG NEUTROPHILS: 67 % (ref 36–65)
SEGMENTED NEUTROPHILS ABSOLUTE COUNT: 4.2 K/UL (ref 1.5–8.1)
SODIUM BLD-SCNC: 132 MMOL/L (ref 135–144)
SODIUM BLD-SCNC: 134 MMOL/L (ref 135–144)
WBC # BLD: 6.2 K/UL (ref 3.5–11.3)

## 2022-12-11 PROCEDURE — 82947 ASSAY GLUCOSE BLOOD QUANT: CPT

## 2022-12-11 PROCEDURE — 72040 X-RAY EXAM NECK SPINE 2-3 VW: CPT

## 2022-12-11 PROCEDURE — 6370000000 HC RX 637 (ALT 250 FOR IP)

## 2022-12-11 PROCEDURE — 2580000003 HC RX 258

## 2022-12-11 PROCEDURE — 2060000000 HC ICU INTERMEDIATE R&B

## 2022-12-11 PROCEDURE — 85025 COMPLETE CBC W/AUTO DIFF WBC: CPT

## 2022-12-11 PROCEDURE — 94761 N-INVAS EAR/PLS OXIMETRY MLT: CPT

## 2022-12-11 PROCEDURE — 36415 COLL VENOUS BLD VENIPUNCTURE: CPT

## 2022-12-11 PROCEDURE — 51701 INSERT BLADDER CATHETER: CPT

## 2022-12-11 PROCEDURE — 6360000002 HC RX W HCPCS

## 2022-12-11 PROCEDURE — 6370000000 HC RX 637 (ALT 250 FOR IP): Performed by: STUDENT IN AN ORGANIZED HEALTH CARE EDUCATION/TRAINING PROGRAM

## 2022-12-11 PROCEDURE — 51798 US URINE CAPACITY MEASURE: CPT

## 2022-12-11 PROCEDURE — 80048 BASIC METABOLIC PNL TOTAL CA: CPT

## 2022-12-11 PROCEDURE — 2580000003 HC RX 258: Performed by: STUDENT IN AN ORGANIZED HEALTH CARE EDUCATION/TRAINING PROGRAM

## 2022-12-11 RX ORDER — SODIUM CHLORIDE 9 MG/ML
INJECTION, SOLUTION INTRAVENOUS PRN
Status: DISCONTINUED | OUTPATIENT
Start: 2022-12-11 | End: 2022-12-11

## 2022-12-11 RX ORDER — SULFAMETHOXAZOLE AND TRIMETHOPRIM 800; 160 MG/1; MG/1
1 TABLET ORAL EVERY 12 HOURS SCHEDULED
Status: DISCONTINUED | OUTPATIENT
Start: 2022-12-11 | End: 2022-12-11

## 2022-12-11 RX ORDER — SULFAMETHOXAZOLE AND TRIMETHOPRIM 400; 80 MG/1; MG/1
1 TABLET ORAL EVERY 12 HOURS SCHEDULED
Status: DISCONTINUED | OUTPATIENT
Start: 2022-12-11 | End: 2022-12-11

## 2022-12-11 RX ADMIN — SENNOSIDES 8.6 MG: 8.6 TABLET, COATED ORAL at 20:25

## 2022-12-11 RX ADMIN — CEFTRIAXONE SODIUM 1000 MG: 10 INJECTION, POWDER, FOR SOLUTION INTRAVENOUS at 13:16

## 2022-12-11 RX ADMIN — ATENOLOL 50 MG: 50 TABLET ORAL at 09:11

## 2022-12-11 RX ADMIN — DEXTROSE MONOHYDRATE 125 ML: 100 INJECTION, SOLUTION INTRAVENOUS at 17:48

## 2022-12-11 RX ADMIN — GABAPENTIN 100 MG: 100 CAPSULE ORAL at 13:26

## 2022-12-11 RX ADMIN — FERROUS SULFATE TAB EC 325 MG (65 MG FE EQUIVALENT) 325 MG: 325 (65 FE) TABLET DELAYED RESPONSE at 09:11

## 2022-12-11 RX ADMIN — GABAPENTIN 100 MG: 100 CAPSULE ORAL at 20:25

## 2022-12-11 RX ADMIN — GABAPENTIN 100 MG: 100 CAPSULE ORAL at 06:31

## 2022-12-11 RX ADMIN — LEVOTHYROXINE SODIUM 100 MCG: 100 TABLET ORAL at 06:31

## 2022-12-11 RX ADMIN — TRAMADOL HYDROCHLORIDE 50 MG: 50 TABLET, COATED ORAL at 20:25

## 2022-12-11 RX ADMIN — Medication 1 TABLET: at 09:11

## 2022-12-11 RX ADMIN — MIRTAZAPINE 15 MG: 15 TABLET, FILM COATED ORAL at 20:25

## 2022-12-11 RX ADMIN — DONEPEZIL HYDROCHLORIDE 5 MG: 5 TABLET, FILM COATED ORAL at 20:25

## 2022-12-11 RX ADMIN — PANTOPRAZOLE SODIUM 40 MG: 40 TABLET, DELAYED RELEASE ORAL at 17:40

## 2022-12-11 RX ADMIN — OXYCODONE HYDROCHLORIDE AND ACETAMINOPHEN 500 MG: 500 TABLET ORAL at 09:11

## 2022-12-11 RX ADMIN — DOXAZOSIN 1 MG: 1 TABLET ORAL at 09:11

## 2022-12-11 RX ADMIN — SODIUM CHLORIDE, PRESERVATIVE FREE 10 ML: 5 INJECTION INTRAVENOUS at 20:26

## 2022-12-11 RX ADMIN — PANTOPRAZOLE SODIUM 40 MG: 40 TABLET, DELAYED RELEASE ORAL at 06:31

## 2022-12-11 ASSESSMENT — PAIN DESCRIPTION - DESCRIPTORS: DESCRIPTORS: ACHING

## 2022-12-11 ASSESSMENT — PAIN DESCRIPTION - LOCATION: LOCATION: NECK

## 2022-12-11 ASSESSMENT — PAIN SCALES - GENERAL
PAINLEVEL_OUTOF10: 7
PAINLEVEL_OUTOF10: 0

## 2022-12-11 NOTE — ED NOTES
Spoke with Dr. Eve Miranda, she stated that she will reorder the potassium protocol.      Frances Werner RN  12/10/22 [address] [place] [address], RN  12/10/22 2006

## 2022-12-11 NOTE — PROGRESS NOTES
Family confirmed that patient's code status changed prior to hospital stay, as noted in previous records. Patient is DNR-CCA. Code status changed within epic to reflect this.     Lala Litten,    Emergency Medicine PGY-1

## 2022-12-11 NOTE — PROGRESS NOTES
PROGRESS NOTE          PATIENT NAME: Terrence [address] RECORD NO. [id number]  DATE: 12/11/2022    HD: # 1      Patient Active Problem List   Diagnosis    Hypertension, benign essential, goal below 140/90    Back pain, chronic    Hearing loss    Obesity (BMI 30.0-34. 9)    Hypothyroidism    Chronic atrial fibrillation (HCC)    History of breast cancer    Dementia with behavioral disturbance    Abdominal pain    BMI 28.0-28.9,adult    Hallucination, visual    Closed displaced fracture of middle phalanx of right middle finger    Perforated viscus    Bowel perforation ([facility])    Open abdominal wall wound    Cervical compression fracture, initial encounter ([facility])       DIAGNOSIS AND PLAN  C2/4 Fx, L1 Fx, Possible T1 Wedge Fx  - Pt in c-collar  - F/U on neurosurg recs  - CTA neck neg  - MRI cervical neg for ligamentous injury     UTI w/ Urinary Retention  - Ceftriaxone IV  - St. Cath done 12/11/22 06:30, 500cc urine output  - WBC 6.2 on 12/11/22    L Scalp Hematoma    Hyperkalemia, Resolved  - K 5.3 on 12/11/22      Chief Complaint: \"feeling well\"    SUBJECTIVE    89F Afebrile, VSS, resting comfortably in bed w/ daughter at bedside. Pt s/p Fall from [facility]. Pt has hx of multiple falls and on Eliquis for a-fib. Pt has no complaints or pain at this time. Pt st cathed this morning d/t urinary retention overnight, 500cc from cath. OBJECTIVE  VITALS:   Vitals:    12/11/22 0335   BP: (!) 113/58   Pulse: 66   Resp: 18   Temp: 98.3 °F (36.8 °C)   SpO2: 93%     Physical Exam  Constitutional:       Appearance: Normal appearance. HENT:      Head: Normocephalic and atraumatic. Nose: Nose normal.   Eyes:      Extraocular Movements: Extraocular movements intact. Pupils: Pupils are equal, round, and reactive to light. Cardiovascular:      Rate and Rhythm: Normal rate and regular rhythm. Pulses: Normal pulses. Heart sounds: Normal heart sounds.    Pulmonary:      Effort: Pulmonary effort is normal.      Breath sounds: Normal breath sounds. Chest:      Chest wall: No tenderness. Abdominal:      General: Abdomen is flat. Bowel sounds are normal. There is no distension. Palpations: Abdomen is soft. Tenderness: There is no abdominal tenderness. Musculoskeletal:         General: Tenderness (T11-L1 region) present. Normal range of motion. Cervical back: Normal range of motion. Tenderness: C3-C4. Right lower leg: No edema. Left lower leg: No edema. Skin:     General: Skin is warm and dry. Neurological:      General: No focal deficit present. Mental Status: She is alert. LAB:  CBC:   Recent Labs     12/10/22  1620 12/11/22  0323   WBC 8.1 6.2   HGB 11.3* 10.0*   HCT 38.0 32.8*   .7* 105.1*    146     BMP:   Recent Labs     12/10/22  1620 12/10/22  1728 12/10/22  2336 12/11/22  0323   *  --   --  134*   K 5.5* 5.4* 5.5* 5.3     --   --  104   CO2 16*  --   --  16*   BUN 33*  --   --  26*   CREATININE 1.34*  --   --  1.16*   GLUCOSE 99  --   --  63*       RADIOLOGY:  CT Cspine w/o Contrast --   Acute nondisplaced fractures of C2 and C4, as detailed above. 2. Possible mild acute wedge compression fracture of the superior endplate of   T1.   CT Head w/o Contrast --  1. No acute intracranial abnormality. 2. Small left parietal scalp hematoma, without acute skull fracture or acute   intracranial hemorrhage. 3. Stable age-appropriate atrophy and chronic small vessel ischemic white   matter disease. 4. Paranasal sinusitis, as detailed above. Omari Tran  12/11/2022, 7:47 AM         Attending Note      I have reviewed the above TECSS note(s) and I either performed the key elements of the medical history and physical exam or was present with the resident when the key elements of the medical history and physical exam were performed. I have discussed the findings, established the care plan and recommendations with Residents.     Marcelina Oconnor, MD  12/11/2022  12:24 PM

## 2022-12-11 NOTE — PLAN OF CARE
Problem: Discharge Planning  Goal: Discharge to home or other facility with appropriate resources  12/11/2022 0814 by Cate Fan RN  Outcome: Progressing  12/11/2022 0141 by Brayan So RN  Outcome: Progressing     Problem: Pain  Goal: Verbalizes/displays adequate comfort level or baseline comfort level  12/11/2022 0814 by Cate Fan RN  Outcome: Progressing  12/11/2022 0141 by Brayan So RN  Outcome: Progressing     Problem: Skin/Tissue Integrity  Goal: Absence of new skin breakdown  12/11/2022 0814 by Cate Fan RN  Outcome: Progressing  12/11/2022 0141 by Brayan So RN  Outcome: Progressing     Problem: Safety - Adult  Goal: Free from fall injury  12/11/2022 0814 by Cate Fan RN  Outcome: Progressing  12/11/2022 0141 by Brayan So RN  Outcome: Progressing

## 2022-12-11 NOTE — ED NOTES
Report given to [facility]; all questions addressed.       Hever Taylor RN  12/10/22 [id number]

## 2022-12-11 NOTE — PROGRESS NOTES
[facility]  Occupational Therapy Not Seen Note    DATE: 2022    NAME: Eduardo Gomes  MRN: [id number]   : 1932      Patient not seen this date for Occupational Therapy due to:    Strict Bedrest:  Strict spinal precautions, maintain cervical collar.  Await NS recommendations    Electronically signed by DEANDRA Oconnor on 2022 at 10:46 AM

## 2022-12-11 NOTE — PROGRESS NOTES
Neurosurgery [facility]/Resident    Daily Progress Note   Chief Complaint   Patient presents with    Fall    Neck Injury     12/11/2022  6:30 AM    Chart reviewed. No acute events overnight. No new complaints. Denies neck pain. Cervical collar intact. Vitals:    12/10/22 2038 12/10/22 2200 12/11/22 0140 12/11/22 0335   BP:  129/64 106/62 (!) 113/58   Pulse: 94 77 68 66   Resp: 17 23 15 18   Temp:  97.7 °F (36.5 °C) 98 °F (36.7 °C) 98.3 °F (36.8 °C)   TempSrc:  Oral Oral Axillary   SpO2: 98% 96% 97% 93%   Weight:       Height:             PE: AOx3 Mcgrath  CNII-XII intact   PERRL, EOMI   Cervical collar intact   Motor   L deltoid 5/5; R deltoid 5/5  L biceps 5/5; R biceps 5/5  L triceps 5/5; R triceps 5/5  L wrist extension 5/5; R wrist extension 5/5     L iliopsoas 5/5 , R iliopsoas 5/5  L quadriceps 5/5; R quadriceps 5/5  L Dorsiflexion 5/5; R dorsiflexion 5/5  L Plantarflexion 5/5; R plantarflexion 5/5      Sensation intact         Lab Results   Component Value Date    WBC 6.2 12/11/2022    HGB 10.0 (L) 12/11/2022    HCT 32.8 (L) 12/11/2022     12/11/2022    CHOL 131 01/03/2013    TRIG 139 01/03/2013    HDL 41 01/03/2013    ALT 9 10/17/2022    AST 20 10/17/2022     (L) 12/11/2022    K 5.3 12/11/2022     12/11/2022    CREATININE 1.16 (H) 12/11/2022    BUN 26 (H) 12/11/2022    CO2 16 (L) 12/11/2022    TSH 1.69 01/15/2021    INR 1.3 10/23/2022       Radiology   XR SHOULDER RIGHT (MIN 2 VIEWS)    Result Date: 12/10/2022  EXAMINATION: 3 XRAY VIEWS OF THE RIGHT SHOULDER 12/10/2022 1:12 pm COMPARISON: None. HISTORY: ORDERING SYSTEM PROVIDED HISTORY: shoulder pain TECHNOLOGIST PROVIDED HISTORY: shoulder pain Reason for Exam: recent fall, rt side neck and shoulder pain per patient FINDINGS: Glenohumeral joint is normally aligned. No evidence of acute fracture or dislocation. No abnormal periarticular calcifications. AC joint degenerative changes Visualized lung is unremarkable.   Prominent right hilar density appears to represent a prominent right artery. No acute abnormality. AC joint degenerative changes     CT Head W/O Contrast    Result Date: 12/10/2022  EXAMINATION: CT OF THE HEAD WITHOUT CONTRAST  12/10/2022 1:18 pm TECHNIQUE: CT of the head was performed without the administration of intravenous contrast. Automated exposure control, iterative reconstruction, and/or weight based adjustment of the mA/kV was utilized to reduce the radiation dose to as low as reasonably achievable. COMPARISON: 10/20/2022 HISTORY: ORDERING SYSTEM PROVIDED HISTORY: [facility] TECHNOLOGIST PROVIDED HISTORY: [facility] Decision Support Exception - unselect if not a suspected or confirmed emergency medical condition->Emergency Medical Condition (MA) Reason for Exam: Pt fell and complains of rt side neck and shoulder pain FINDINGS: BRAIN/VENTRICLES: There is no acute intracranial hemorrhage, mass effect or midline shift. No abnormal extra-axial fluid collection. The gray-white differentiation is maintained without evidence of an acute infarct. There is no evidence of hydrocephalus. There is stable age-appropriate atrophy. There is stable chronic small vessel ischemic white matter disease. Bilateral basal ganglia calcifications are evident. No focus of acute abnormal brain attenuation is identified. ORBITS: The visualized portion of the orbits demonstrate no acute abnormality. SINUSES: There are changes of chronic sinusitis, including a thickened sclerotic appearance of the bilateral maxillary sinus walls. There has been previous sinus surgery. There is mild mucosal thickening within the left maxillary sinus. There is an air-fluid level within the left sphenoid sinus. There is mild-to-moderate mucosal thickening affecting the left ethmoid sinus cellules. The mastoids are clear bilaterally. SOFT TISSUES/SKULL:  There is a mild left parietal scalp hematoma, without acute skull fracture.   There is no acute osseous abnormality of the skull base or calvarium. 1. No acute intracranial abnormality. 2. Small left parietal scalp hematoma, without acute skull fracture or acute intracranial hemorrhage. 3. Stable age-appropriate atrophy and chronic small vessel ischemic white matter disease. 4. Paranasal sinusitis, as detailed above. CT CSpine W/O Contrast    Result Date: 12/10/2022  EXAMINATION: CT OF THE CERVICAL SPINE WITHOUT CONTRAST 12/10/2022 1:18 pm TECHNIQUE: CT of the cervical spine was performed without the administration of intravenous contrast. Multiplanar reformatted images are provided for review. Automated exposure control, iterative reconstruction, and/or weight based adjustment of the mA/kV was utilized to reduce the radiation dose to as low as reasonably achievable. COMPARISON: None. HISTORY: ORDERING SYSTEM PROVIDED HISTORY: neck pain TECHNOLOGIST PROVIDED HISTORY: neck pain Decision Support Exception - unselect if not a suspected or confirmed emergency medical condition->Emergency Medical Condition (MA) Reason for Exam: Pt fell and complains of rt side neck and shoulder pain FINDINGS: BONES/ALIGNMENT: There is an acute nondisplaced fracture of the right lateral mass of C2 with extension into the foramen transversarium. There is also an acute nondisplaced fracture of the right lateral aspect of the C4 vertebral body which extends through the right pedicle and into the right articular facet of C4. This also appears to involve the right lamina of C4 as well. There may be a mild acute wedge compression fracture of the superior endplate of T1. No additional acute fracture is identified. No significant subluxation is seen. No destructive osseous lesion is identified. DEGENERATIVE CHANGES: There is multilevel spondylosis throughout the cervical spine, most notable at C5 through C7. There is disc space loss and endplate sclerosis at S7-W2. There is no acute disc herniation or canal stenosis.  However, there is multilevel bilateral uncovertebral and facet hypertrophy, leading to varying degrees of multilevel bilateral neuroforaminal stenosis. SOFT TISSUES: The cervical soft tissues are unremarkable. There are a few subcentimeter nodules within the bilateral lung apices, most likely noncalcified granulomata. Given patient age, no follow-up is suggested. 1. Acute nondisplaced fractures of C2 and C4, as detailed above. 2. Possible mild acute wedge compression fracture of the superior endplate of T1. Findings were discussed with Alan Penny at 1:57 pm on 12/10/2022. MRI CERVICAL SPINE WO CONTRAST    Result Date: 12/10/2022  EXAMINATION: MRI OF THE CERVICAL SPINE WITHOUT CONTRAST 12/10/2022 6:52 pm TECHNIQUE: Multiplanar multisequence MRI of the cervical spine was performed without the administration of intravenous contrast. COMPARISON: None. HISTORY: ORDERING SYSTEM PROVIDED HISTORY: evaluation cervical fractures, r/o ligamental injury TECHNOLOGIST PROVIDED HISTORY: evaluation cervical fractures, r/o ligamental injury What is the sedation requirement?->None Reason for Exam: evaluation cervical fractures, r/o ligamental injury Relevant Medical/Surgical History: Fall; Neck Injury, Cervical compression fracture, initial encounter FINDINGS: BONES/ALIGNMENT: There is normal alignment of the spine. At the expected location of the fracture of right lateral mass of C2 there is marrow edema. There is an acute superior endplate compression fracture of C4 and and T1 with 5-10% height loss. No associated ligamentous injury is noted. The vertebral body heights are otherwise maintained. The bone marrow signal appears unremarkable. SPINAL CORD: No abnormal cord signal is seen. SOFT TISSUES: No paraspinal mass identified. Axial images are significantly degraded by patient motion and neural foraminal narrowing cannot be reliably evaluated. There is moderate canal stenosis at the level of C5-C6 and C6-C7.   No significant posterior disc pathology. Images are suboptimal due to patient motion At the expected location of the fracture of right lateral mass of C2 there is marrow edema. There is an acute superior endplate compression fracture of C4 and and T1 with 5-10% height loss. No associated ligamentous injury is noted. Axial images are significantly degraded by patient motion and neural foraminal narrowing cannot be reliably evaluated. There is moderate canal stenosis at the level of C5-C6 and C6-C7. No significant posterior disc pathology. CTA HEAD NECK W CONTRAST    Result Date: 12/10/2022  EXAMINATION: CTA OF THE HEAD AND NECK WITH CONTRAST 12/10/2022 5:27 pm: TECHNIQUE: CTA of the head and neck was performed with the administration of intravenous contrast. Multiplanar reformatted images are provided for review. MIP images are provided for review. Stenosis of the internal carotid arteries measured using NASCET criteria. Automated exposure control, iterative reconstruction, and/or weight based adjustment of the mA/kV was utilized to reduce the radiation dose to as low as reasonably achievable. COMPARISON: None. HISTORY: ORDERING SYSTEM PROVIDED HISTORY: C2 fracture FINDINGS: CTA NECK: Motion artifact is present. AORTIC ARCH/ARCH VESSELS: No dissection or arterial injury. No significant stenosis of the brachiocephalic or subclavian arteries. CAROTID ARTERIES: Atherosclerosis in carotid bifurcations. No dissection, arterial injury, or hemodynamically significant stenosis by NASCET criteria. VERTEBRAL ARTERIES: No dissection, arterial injury, or significant stenosis. SOFT TISSUES: The lung apices are clear. No cervical or superior mediastinal lymphadenopathy. The larynx and pharynx are unremarkable. No acute abnormality of the salivary and thyroid glands. BONES: Acute nondisplaced fractures of C2 and C4 are redemonstrated.  CTA HEAD: ANTERIOR CIRCULATION: No significant stenosis of the intracranial internal carotid, anterior cerebral, or middle cerebral arteries. No aneurysm. POSTERIOR CIRCULATION: No significant stenosis of the vertebral, basilar, or posterior cerebral arteries. No aneurysm. OTHER: No dural venous sinus thrombosis on this non-dedicated study. BRAIN: No mass effect or midline shift. No extra-axial fluid collection. The gray-white differentiation is maintained. Motion limited exam.  No evidence of arterial injury in the head and neck. RECOMMENDATIONS: Unavailable     CT CHEST ABDOMEN PELVIS WO CONTRAST Additional Contrast? None    Result Date: 12/10/2022  EXAMINATION: CT OF THE THORACIC SPINE WITHOUT CONTRAST; CT OF THE LUMBAR SPINE WITHOUT CONTRAST; CT OF THE CHEST, ABDOMEN, AND PELVIS WITHOUT CONTRAST  12/10/2022 5:27 pm: TECHNIQUE: CT of the thoracic spine was performed without the administration of intravenous contrast. Multiplanar reformatted images are provided for review. Automated exposure control, iterative reconstruction, and/or weight based adjustment of the mA/kV was utilized to reduce the radiation dose to as low as reasonably achievable.; CT of the lumbar spine was performed without the administration of intravenous contrast. Multiplanar reformatted images are provided for review. Adjustment of mA and/or kV according to patient size was utilized. Automated exposure control, iterative reconstruction, and/or weight based adjustment of the mA/kV was utilized to reduce the radiation dose to as low as reasonably achievable.; CT of the chest, abdomen and pelvis was performed without the administration of intravenous contrast. Multiplanar reformatted images are provided for review. Automated exposure control, iterative reconstruction, and/or weight based adjustment of the mA/kV was utilized to reduce the radiation dose to as low as reasonably achievable.  COMPARISON: CT of the abdomen and pelvis of 10/20/2022 HISTORY: ORDERING SYSTEM PROVIDED HISTORY: fall TECHNOLOGIST PROVIDED HISTORY: fall FINDINGS: Thoracic: BONES/ALIGNMENT: There is no acute fracture or traumatic malalignment. DEGENERATIVE CHANGES: No significant degenerative changes. SOFT TISSUES: There is no prevertebral soft tissue swelling. Lumbar: BONES/ALIGNMENT: Unchanged chronic fracture deformity of superior endplate of L3 and L4 and L5 with with 20-40% height loss and 2-4 mm retropulsion of superior endplate into the spinal canal.  There is a 20-30% height loss of L1 vertebral body which was not present on prior examination. Finding is likely suggestive of acute fracture however no discrete fracture line is visualized. DEGENERATIVE CHANGES: Mild disc and facet degenerative disease at L3-L4 and L4-L5. SOFT TISSUES: There is no prevertebral soft tissue swelling. CT chest: Lines and tubes:  None. Lungs and Airways and Pleura:  Central airways are patent. No lung consolidation. No pleural effusion. No pneumothorax. Lymph nodes: No pathologically enlarged mediastinal, hilar, lower cervical, or chest wall lymph nodes. Cardiovascular and Mediastinum: No acute aortic pathology. Cardiac chamber sizes appear to measure within normal limits on this non ECG gated study. No pericardial effusion. The thyroid gland is unremarkable. The esophagus is unremarkable. Bones/Soft tissues: No fracture. No definite suspicious lytic or blastic foci. CT abdomen and pelvis: Organs: The liver, spleen, adrenal glands, gallbladder, pancreas, kidneys and ureters and pelvic organs including the urinary bladder appear unremarkable. Significant distention of urinary bladder extending into the cavity. Peritoneum / Retroperitoneum:  No free air or free fluid is noted. No pathologically enlarged lymphadenopathy. The vasculature do not demonstrate acute abnormality. GI Tract:  No distention or wall thickening. Diverticulosis with no evidence of diverticulitis. Bones and Soft Tissues: No fracture. No concerning lytic or sclerotic lesions are noted.  Changes related to prior anterior abdominal wall surgery. 2.5 cm fat containing area within the upper aspect of anterior abdominal wall likely related to subcutaneous fat entrapment  in subcutaneous region after prior surgery. .  Finding was not present on prior examination. Thoracic CT: No acute osseous abnormality of thoracic spine. No fracture. Lumbar CT: Unchanged chronic fracture deformity of superior endplate of L3 and L4 and L5 with with 20-40% height loss and 2-4 mm retropulsion of superior endplate into the spinal canal. 20-30% height loss of L1 vertebral body which was not present on prior examination. Finding is likely suggestive of acute fracture however no discrete fracture line is visualized. For further evaluation lumbar spine MRI can be obtained. CT of the chest abdomen and pelvis: No acute traumatic injury within the chest abdomen and pelvis. Changes related to prior anterior abdominal wall surgery. Significant distention of urinary bladder extending into the abdominal cavity. Diverticulosis with no evidence of diverticulitis. CT LUMBAR SPINE TRAUMA RECONSTRUCTION    Result Date: 12/10/2022  EXAMINATION: CT OF THE THORACIC SPINE WITHOUT CONTRAST; CT OF THE LUMBAR SPINE WITHOUT CONTRAST; CT OF THE CHEST, ABDOMEN, AND PELVIS WITHOUT CONTRAST  12/10/2022 5:27 pm: TECHNIQUE: CT of the thoracic spine was performed without the administration of intravenous contrast. Multiplanar reformatted images are provided for review. Automated exposure control, iterative reconstruction, and/or weight based adjustment of the mA/kV was utilized to reduce the radiation dose to as low as reasonably achievable.; CT of the lumbar spine was performed without the administration of intravenous contrast. Multiplanar reformatted images are provided for review. Adjustment of mA and/or kV according to patient size was utilized.   Automated exposure control, iterative reconstruction, and/or weight based adjustment of the mA/kV was utilized to reduce the radiation dose to as low as reasonably achievable.; CT of the chest, abdomen and pelvis was performed without the administration of intravenous contrast. Multiplanar reformatted images are provided for review. Automated exposure control, iterative reconstruction, and/or weight based adjustment of the mA/kV was utilized to reduce the radiation dose to as low as reasonably achievable. COMPARISON: CT of the abdomen and pelvis of 10/20/2022 HISTORY: ORDERING SYSTEM PROVIDED HISTORY: fall TECHNOLOGIST PROVIDED HISTORY: fall FINDINGS: Thoracic: BONES/ALIGNMENT: There is no acute fracture or traumatic malalignment. DEGENERATIVE CHANGES: No significant degenerative changes. SOFT TISSUES: There is no prevertebral soft tissue swelling. Lumbar: BONES/ALIGNMENT: Unchanged chronic fracture deformity of superior endplate of L3 and L4 and L5 with with 20-40% height loss and 2-4 mm retropulsion of superior endplate into the spinal canal.  There is a 20-30% height loss of L1 vertebral body which was not present on prior examination. Finding is likely suggestive of acute fracture however no discrete fracture line is visualized. DEGENERATIVE CHANGES: Mild disc and facet degenerative disease at L3-L4 and L4-L5. SOFT TISSUES: There is no prevertebral soft tissue swelling. CT chest: Lines and tubes:  None. Lungs and Airways and Pleura:  Central airways are patent. No lung consolidation. No pleural effusion. No pneumothorax. Lymph nodes: No pathologically enlarged mediastinal, hilar, lower cervical, or chest wall lymph nodes. Cardiovascular and Mediastinum: No acute aortic pathology. Cardiac chamber sizes appear to measure within normal limits on this non ECG gated study. No pericardial effusion. The thyroid gland is unremarkable. The esophagus is unremarkable. Bones/Soft tissues: No fracture. No definite suspicious lytic or blastic foci. CT abdomen and pelvis: Organs:  The liver, spleen, adrenal glands, gallbladder, pancreas, kidneys and ureters and pelvic organs including the urinary bladder appear unremarkable. Significant distention of urinary bladder extending into the cavity. Peritoneum / Retroperitoneum:  No free air or free fluid is noted. No pathologically enlarged lymphadenopathy. The vasculature do not demonstrate acute abnormality. GI Tract:  No distention or wall thickening. Diverticulosis with no evidence of diverticulitis. Bones and Soft Tissues: No fracture. No concerning lytic or sclerotic lesions are noted. Changes related to prior anterior abdominal wall surgery. 2.5 cm fat containing area within the upper aspect of anterior abdominal wall likely related to subcutaneous fat entrapment  in subcutaneous region after prior surgery. .  Finding was not present on prior examination. Thoracic CT: No acute osseous abnormality of thoracic spine. No fracture. Lumbar CT: Unchanged chronic fracture deformity of superior endplate of L3 and L4 and L5 with with 20-40% height loss and 2-4 mm retropulsion of superior endplate into the spinal canal. 20-30% height loss of L1 vertebral body which was not present on prior examination. Finding is likely suggestive of acute fracture however no discrete fracture line is visualized. For further evaluation lumbar spine MRI can be obtained. CT of the chest abdomen and pelvis: No acute traumatic injury within the chest abdomen and pelvis. Changes related to prior anterior abdominal wall surgery. Significant distention of urinary bladder extending into the abdominal cavity. Diverticulosis with no evidence of diverticulitis.      CT THORACIC SPINE TRAUMA RECONSTRUCTION    Result Date: 12/10/2022  EXAMINATION: CT OF THE THORACIC SPINE WITHOUT CONTRAST; CT OF THE LUMBAR SPINE WITHOUT CONTRAST; CT OF THE CHEST, ABDOMEN, AND PELVIS WITHOUT CONTRAST  12/10/2022 5:27 pm: TECHNIQUE: CT of the thoracic spine was performed without the administration of intravenous contrast. Multiplanar reformatted images are provided for review. Automated exposure control, iterative reconstruction, and/or weight based adjustment of the mA/kV was utilized to reduce the radiation dose to as low as reasonably achievable.; CT of the lumbar spine was performed without the administration of intravenous contrast. Multiplanar reformatted images are provided for review. Adjustment of mA and/or kV according to patient size was utilized. Automated exposure control, iterative reconstruction, and/or weight based adjustment of the mA/kV was utilized to reduce the radiation dose to as low as reasonably achievable.; CT of the chest, abdomen and pelvis was performed without the administration of intravenous contrast. Multiplanar reformatted images are provided for review. Automated exposure control, iterative reconstruction, and/or weight based adjustment of the mA/kV was utilized to reduce the radiation dose to as low as reasonably achievable. COMPARISON: CT of the abdomen and pelvis of 10/20/2022 HISTORY: ORDERING SYSTEM PROVIDED HISTORY: fall TECHNOLOGIST PROVIDED HISTORY: fall FINDINGS: Thoracic: BONES/ALIGNMENT: There is no acute fracture or traumatic malalignment. DEGENERATIVE CHANGES: No significant degenerative changes. SOFT TISSUES: There is no prevertebral soft tissue swelling. Lumbar: BONES/ALIGNMENT: Unchanged chronic fracture deformity of superior endplate of L3 and L4 and L5 with with 20-40% height loss and 2-4 mm retropulsion of superior endplate into the spinal canal.  There is a 20-30% height loss of L1 vertebral body which was not present on prior examination. Finding is likely suggestive of acute fracture however no discrete fracture line is visualized. DEGENERATIVE CHANGES: Mild disc and facet degenerative disease at L3-L4 and L4-L5. SOFT TISSUES: There is no prevertebral soft tissue swelling. CT chest: Lines and tubes:  None. Lungs and Airways and Pleura:  Central airways are patent. No lung consolidation.  No pleural effusion. No pneumothorax. Lymph nodes: No pathologically enlarged mediastinal, hilar, lower cervical, or chest wall lymph nodes. Cardiovascular and Mediastinum: No acute aortic pathology. Cardiac chamber sizes appear to measure within normal limits on this non ECG gated study. No pericardial effusion. The thyroid gland is unremarkable. The esophagus is unremarkable. Bones/Soft tissues: No fracture. No definite suspicious lytic or blastic foci. CT abdomen and pelvis: Organs: The liver, spleen, adrenal glands, gallbladder, pancreas, kidneys and ureters and pelvic organs including the urinary bladder appear unremarkable. Significant distention of urinary bladder extending into the cavity. Peritoneum / Retroperitoneum:  No free air or free fluid is noted. No pathologically enlarged lymphadenopathy. The vasculature do not demonstrate acute abnormality. GI Tract:  No distention or wall thickening. Diverticulosis with no evidence of diverticulitis. Bones and Soft Tissues: No fracture. No concerning lytic or sclerotic lesions are noted. Changes related to prior anterior abdominal wall surgery. 2.5 cm fat containing area within the upper aspect of anterior abdominal wall likely related to subcutaneous fat entrapment  in subcutaneous region after prior surgery. .  Finding was not present on prior examination. Thoracic CT: No acute osseous abnormality of thoracic spine. No fracture. Lumbar CT: Unchanged chronic fracture deformity of superior endplate of L3 and L4 and L5 with with 20-40% height loss and 2-4 mm retropulsion of superior endplate into the spinal canal. 20-30% height loss of L1 vertebral body which was not present on prior examination. Finding is likely suggestive of acute fracture however no discrete fracture line is visualized. For further evaluation lumbar spine MRI can be obtained. CT of the chest abdomen and pelvis: No acute traumatic injury within the chest abdomen and pelvis.   Changes related to prior anterior abdominal wall surgery. Significant distention of urinary bladder extending into the abdominal cavity. Diverticulosis with no evidence of diverticulitis. A/P  [address] y.o. female who presents with mechanical fall with acute nondisplaced C2 and C4 fractures. - CTLS recommendations: Maintain cervical collar   - No NS intervention at this time. Follow up in 4-6 weeks with repeat upright cervical XR's. Will need bone growth stimulator. Message sent to clinic and imaging ordered. - NS will sign off. Please call with questions/concerns   - Ok to begin prophylactic anticoagulation from neurosurgery stand point.   - Upright cervical XR's ordered for baseline       Please contact neurosurgery with any changes in patients neurologic status.        Cheryl Bloom CNP  12/11/22  6:30 AM

## 2022-12-11 NOTE — CARE COORDINATION
12/11/22 1538   Service Assessment   Patient Orientation Unable to Assess  (pt sleeping)   Cognition Short Term Memory Deficit   History Provided By Child/Family  (DTR Norberto Kieranjay)   Primary [facility] Family Members   PCP Verified by CM Yes   Last Visit to PCP Within last 3 months   Prior Functional Level Assistance with the following:;Bathing;Dressing;Housework; Shopping  (pt has declined over the last week was able to do own laundry amb at home w her RW)   Current Functional Level Assistance with the following:;Bathing;Dressing; Toileting;Feeding   Can patient return to prior living arrangement Unknown at present   Ability to make needs known: Other (see comment)  (not assessed)   Would you like for me to discuss the discharge plan with any other family members/significant others, and if so, who? Yes  (DTR states she has HCPOA requested paperwork)   Social/Functional History   Lives With Family  (lives with dtr Norberto Zendejas)   Type of [address] [place] [address] Two level; Able to Live on Main level with bedroom/bathroom   Home Access Stairs to enter with rails   Entrance Stairs - Number of Steps 2 pt was able to amb the two stairs prior   Entrance Stairs - Rails Both   [place] Shower/Tub Walk-in shower   Bathroom Toilet Handicap height   Bathroom Equipment Grab bars in shower   [facility], 4 wheeled; Walker, rolling;Hospital bed  (BSC)   Receives Help From Family   ADL Assistance Needs assistance   Bath Moderate assistance  (pt up until a week ago was doing these with just setup)   Dressing Moderate assistance   Grooming Moderate assistance   Feeding Moderate assistance   Toileting Needs assistance   Ambulation Assistance Needs assistance   Transfer Assistance Needs assistance   Active  No   Patient's [facility] DTR   Mode of Transportation Car   Occupation Retired   Discharge Planning   Type of [facility] [address] Arrangements Family Members   Current Services Prior To Admission Home Care  ([facility] that ended [facility] was then to restart because pt was to have wound vac placed on abd wound)   Potential [facility] Medications No   Type of [facility]   Patient expects to be discharged to: House   History of falls? 1   Condition of Participation: Discharge Planning   The Plan for Transition of Care is related to the following treatment goals: Treat UTI and resume adls may need SNF goal is home w/[facility]   [place] of Choice list was provided with basic dialogue that supports the patient's individualized plan of care/goals, treatment preferences, and shares the quality data associated with the providers? Yes  (SNF list given to Katlin Lord)   LIves w dtr Mely Neely 2 story pt bed and bath main level was getting around fine with RW able to do a little of her own laundry until about 1 wk ago she has been declining. Dtr has noticed a slight decline since her surgery in Nov She has her last vs w/[facility] but then went to surgeon's flu and needed a wound vac so [facility] was to restart. She is also seen at [facility] wound clinic Maeve's goal is for her Mom to go back home as along as she can get around to bath safely. Case Management Assessment  Initial Evaluation    Date/Time of Evaluation: 12/11/2022 3:51 PM  Assessment Completed by: Kurtis Benavidez RN    If patient is discharged prior to next notation, then this note serves as note for discharge by case management. Patient Name: Leandro Santo                   YOB: 1932  Diagnosis: Cervical compression fracture, initial [facility] [S12. 9XXA]  Fall, subsequent encounter [id number]. XXXD]  Other closed nondisplaced fracture of second cervical vertebra, sequela [S12.191S]  Other closed nondisplaced fracture of fourth cervical vertebra, initial encounter ([facility] Camelia Warren Date / Time: 12/10/2022  3:57 PM    Patient Admission Status: Inpatient   Readmission Risk (Low < 19, Mod (19-27), High > 27): Readmission Risk Score: 21.6    Current PCP: Ciarra Vogel MD  PCP verified by CM? (P) Yes    Chart Reviewed: Yes      History Provided by: (P) Child/Family (DTR Indira Katz)  Patient Orientation: (P) Unable to Assess (pt sleeping)    Patient Cognition: (P) Short Term Memory Deficit    Hospitalization in the last 30 days (Readmission):  No    If yes, Readmission Assessment in  Navigator will be completed. Advance Directives:      Code Status: DNR-CCA   Patient's Primary Decision Maker is:      Primary Decision Maker: Maeve Love - Child - [phone]    Discharge Planning:    Patient lives with: (P) Family Members Type of Home: (P) House  Primary Care Giver: (P) Family  Patient Support Systems include: (P) Family Members   Current Financial resources:    Current community resources:    Current services prior to admission: (P) Home Care ([facility] was then to restart because pt was to have wound vac placed on abd wound)            Current DME:              Type of Home Care services:  (P) Nursing Services    ADLS  Prior functional level: (P) Assistance with the following:, Bathing, Dressing, Housework, Shopping (pt has declined over the last week was able to do own laundry amb at home w her RW)  Current functional level: (P) Assistance with the following:, Bathing, Dressing, Toileting, Feeding    PT AM-PAC:   /24  OT AM-PAC:   /24    Family can provide assistance at DC:     Would you like Case Management to discuss the discharge plan with any other family members/significant others, and if so, who? (P) Yes (DTR states she has HCPOA requested paperwork)  Plans to Return to Present Housing: (P) Unknown at present  Other Identified Issues/Barriers to RETURNING to current housing: endurance and strength for safety  Potential Assistance needed at discharge: (P) Home Care, [facility]            Potential DME:    Patient expects to discharge to: (P) [facility] for transportation at discharge:      Financial    Payor: [address] / Plan: Lei Khan / Product Type: *No Product type* /     Does insurance require precert for SNF: Yes    Potential assistance Purchasing Medications: (P) No  Meds-to-Beds request:        [address], [address] [place] [address] [phone] - Q [address] [place] [address]  Phone: [phone] Fax: [place] [address] [phone] - f [phone]  [address] [place] [address]  Phone: [phone] Fax: [phone]      Notes:    Factors facilitating achievement of predicted outcomes: Family support and Caregiver support    Barriers to discharge: Decreased motivation, Decreased endurance, Upper extremity weakness, and Lower extremity weakness    Additional Case Management Notes: DTR wants home w/[facility]    The Plan for Transition of Care is related to the following treatment goals of Cervical compression fracture, initial encounter ([address] [S12. 9XXA]  Fall, subsequent encounter [id number]. XXXD]  Other closed nondisplaced fracture of second cervical vertebra, sequela [S12.191S]  Other closed nondisplaced fracture of fourth cervical vertebra, initial encounter ([address] [U32.926H]    IF APPLICABLE: The Patient and/or patient representative Lorna Whalen and her family were provided with a choice of provider and agrees with the discharge plan. Freedom of choice list with basic dialogue that supports the patient's individualized plan of care/goals and shares the quality data associated with the providers was provided to:     Patient Representative Name:       The Patient and/or Patient Representative Agree with the Discharge Plan?       Olesya Daly RN  Case Management Department  Ph: [phone] Fax: Post Acute Facility/Agency List     Provided child with the following list, the list includes the overall star ratings obtained from CMS per the Medicare Web site (www.Medicare.gov):     [] [address]  [] Acute Inpatient Rehabilitation Facilities  [x] Skilled Nursing Facilities  [] Home Care    Provided verbal instructions on how to utilize the QR Code to obtain additional detailed star ratings from www. Medicare. gov     offered to print and provide the detailed list:    [x]Accepted   []Declined    The following printed detailed lists were provided:     Snf list  Medicare. gov list

## 2022-12-11 NOTE — ED NOTES
Pt resting comfortably in no acute distress. Respirations even and unlabored. Call light remains within reach. No needs at this time.   Pt in [address] Timi Ahuja RN  12/10/22 1916

## 2022-12-11 NOTE — PROGRESS NOTES
Trauma Tertiary Survey    Admit Date: 12/10/2022  Hospital day 1    [facility]       Past Medical History:   Diagnosis Date    Atrial fibrillation 3/28/2014    Back pain, chronic 6/1/2015    Dementia ([address]     GERD (gastroesophageal reflux disease)     Hiatal hernia     History of breast cancer 12/1/2015    History of glaucoma     Hypertension, benign essential, goal below 140/90 6/1/2015    Hypothyroid 6/1/2015       Scheduled Meds:   cefTRIAXone (ROCEPHIN) IV  1,000 mg IntraVENous Once    sodium chloride flush  5-40 mL IntraVENous 2 times per day    polyethylene glycol  17 g Oral Daily    gabapentin  100 mg Oral q8h    senna  1 tablet Oral Nightly    atenolol  50 mg Oral Daily    donepezil  5 mg Oral Nightly    doxazosin  1 mg Oral Daily    ferrous sulfate  325 mg Oral Daily with breakfast    levothyroxine  100 mcg Oral Daily    mirtazapine  15 mg Oral Nightly    therapeutic multivitamin-minerals  1 tablet Oral Daily    pantoprazole  40 mg Oral BID AC    ascorbic acid  500 mg Oral Daily     Continuous Infusions:   dextrose      sodium chloride      sodium chloride 75 mL/hr at 12/11/22 0625    dextrose       PRN Meds:glucose, dextrose bolus **OR** dextrose bolus, glucagon (rDNA), dextrose, sodium chloride flush, sodium chloride, ondansetron **OR** ondansetron, bisacodyl, traMADol, glucose, dextrose bolus **OR** dextrose bolus, glucagon (rDNA), dextrose    Subjective:   89F s/p FFS. afebrile, VSS. pt resting comfortably in bed, has no complaints or pain at this time. Daughter at bedside. Pt being treated for UTI, no urine output overnight,st. Cath @ 6:30am resulting in 500cc urine. No acute events overnight. Objective:   Patient Vitals for the past 8 hrs:   BP Temp Temp src Pulse Resp SpO2   12/11/22 0335 (!) 113/58 98.3 °F (36.8 °C) Axillary 66 18 93 %   12/11/22 0140 106/62 98 °F (36.7 °C) Oral 68 15 97 %       I/O last 3 completed shifts:   In: 857.5 [I.V.:857.5]  Out: 1700 [Urine:1700]  No intake/output data recorded. Radiology:  CT Cspine w/o Contrast --   Acute nondisplaced fractures of C2 and C4, as detailed above. 2. Possible mild acute wedge compression fracture of the superior endplate of   T1.   CT Head w/o Contrast --  1. No acute intracranial abnormality. 2. Small left parietal scalp hematoma, without acute skull fracture or acute   intracranial hemorrhage. 3. Stable age-appropriate atrophy and chronic small vessel ischemic white   matter disease. 4. Paranasal sinusitis, as detailed above. PHYSICAL EXAM:   GCS:  4 - Opens eyes on own   6 - Follows simple motor commands  5 - Alert and oriented    Pupil size:  Left 4 mm Right 4 mm  Pupil reaction: Yes  Wiggles fingers: Left Yes Right Yes  Hand grasp:   Left normal   Right normal  Wiggles toes: Left Yes    Right Yes  Plantar flexion: Left normal  Right normal    General appearance: alert, appears stated age, and cooperative  Head: Normocephalic, without obvious abnormality, atraumatic  Eyes: conjunctivae/corneas clear. PERRL, EOM's intact. Fundi benign. Ears: normal TM's and external ear canals both ears  Nose: Nares normal. Septum midline. Mucosa normal. No drainage or sinus tenderness. Throat: lips, mucosa, and tongue normal; teeth and gums normal  Neck: TTP of C3-C4 region. No adenopathy, JVD, masses, or nodules. [place]acha midline, thyroid not enlarged. Back:  Normal ROM and curvature, TTP in T11-L1 Region.    Lungs: clear to auscultation bilaterally  Heart: regular rate and rhythm, S1, S2 normal, no murmur, click, rub or gallop  Abdomen: soft, non-tender; bowel sounds normal; no masses,  no organomegaly and dressing present on L side from previous abd perf surgery 10/2022  Extremities: extremities normal, atraumatic, no cyanosis or edema  Pulses: 2+ and symmetric  Skin: Skin color, texture, turgor normal. No rashes or lesions  Neurologic: Grossly normal    Spine:     Spine Tenderness ROM   Cervical 5 /10 Normal, pt in c-collar   Thoracic 4 /10 Normal   Lumbar 4 /10 Normal     Musculoskeletal    Joint Tenderness Swelling ROM   Right shoulder absent absent normal   Left shoulder absent absent normal   Right elbow absent absent normal   Left elbow absent absent normal   Right wrist absent absent normal   Left wrist absent absent normal   Right hand grasp absent absent normal   Left hand grasp absent absent normal   Right hip absent absent normal   Left hip absent absent normal   Right knee absent absent normal   Left knee absent absent normal   Right ankle absent absent normal   Left ankle absent absent normal   Right foot absent absent normal   Left foot absent absent normal         CONSULTS: Neurosurgery     PROCEDURES: N/A    INJURIES:  C2/4 Fx, L1 Fx, Possible T1 Wedge Fx, L scalp hematoma      Patient Active Problem List   Diagnosis    Hypertension, benign essential, goal below 140/90    Back pain, chronic    Hearing loss    Obesity (BMI 30.0-34. 9)    Hypothyroidism    Chronic atrial fibrillation ([facility])    History of breast cancer    Dementia with behavioral disturbance    Abdominal pain    BMI 28.0-28.9,adult    Hallucination, visual    Closed displaced fracture of middle phalanx of right middle finger    Perforated viscus    Bowel perforation ([facility])    Open abdominal wall wound    Cervical compression fracture, initial encounter ([facility])         Assessment/Plan:     C2/4 Fx, L1 Fx, Possible T1 Wedge Fx  - Pt in c-collar  - F/U on neurosurg recs  - CTA neck neg  - MRI cervical neg for ligamentous injury    Urinary Retention  - on bactrim outpt for UTI  - St. Cath done 12/11/22 06:30, 500cc urine output    Zuleima Osuna  12/11/22, 7:24 AM

## 2022-12-11 NOTE — DISCHARGE INSTRUCTIONS
Spinal Fracture Discharge Instructions     Thank you for choosing [facility] and [facility] for your recovery needs. The following instructions will help to ensure your comfort and that you are well prepared for recovery. Follow-up Visit:   The office is located at:    [address],  [address]    [facility], main floor    [address]    [phone]      [x] Please obtain upright x-rays of your Cervical spine 1-2 days prior to appointment. Please also call your primary care physician to schedule an appointment for further evaluation and care. Diet:   You may resume your regular diet. Be sure to eat a well-balanced diet. Protein promotes wound healing. Pain medication and decreased activity can cause constipation. Drink 8-10 glasses of water a day, eat fresh fruits and vegetables, and add prunes, raisins and bran cereals to your diet if you do become constipated. A stool softener taken 1-2 times a day is helpful. Dulcolax suppositories or Fleets enemas are also available without a prescription. Call our office if the problem continues. Activity and Exercise:   No driving until you are seen in the office. Avoid riding in a car for the first two weeks if possible, until you come to the office for your scheduled follow-up. Start taking short, frequent walks in the beginning. [place], more frequent walks throughout the day are more beneficial than one long walk each day. You may gradually increase the distance; as tolerated. Your brace will help give support to your muscles while you walk. If your pain increases, you may be walking too much or too far. Try backing off for a day or two and then resume slowly. No lifting greater than 5 lbs (gallon of milk). No bending at the waist. Instead, bend at the knees and squat to pick something up.    If physical therapy has been prescribed, you are not to perform range of motion, flexion, extension or lateral bending. Brace: Your Neck Brace should be worn at all times. Pain Management:   You will be given a prescription for pain medication. Our hope is that you will eventually be weaned off all pain medications. Try not take the pain medicine unless you need to. If you feel that you do not need something that strong, you may use regular or extra-strength Tylenol instead. DO NOT drink alcohol, drive or operate heavy machinery while taking your pain medications. Notify the office if your pain is not controlled or you need a medication refill before your appointment. YOU SHOULD CALL THE OFFICE [phone] IF YOU HAVE ANY OF THE FOLLOWING:   Increased pain or pain not relieved with current medications   New or increased pain, numbness or tingling in the legs, as well as new or increased balance or coordination issues. New difficulty with urinating or holding your bladder or your bowels     *If you are unable to contact someone at the office and your symptoms persist or increase, call 911 or go to the emergency department.

## 2022-12-12 ENCOUNTER — TELEPHONE (OUTPATIENT)
Dept: NEUROSURGERY | Age: 87
End: 2022-12-12

## 2022-12-12 ENCOUNTER — HOSPITAL ENCOUNTER (OUTPATIENT)
Dept: WOUND CARE | Age: 87
Discharge: HOME | End: 2022-12-12

## 2022-12-12 DIAGNOSIS — T14.8XXA WOUND INFECTION: Primary | ICD-10-CM

## 2022-12-12 DIAGNOSIS — L08.9 WOUND INFECTION: Primary | ICD-10-CM

## 2022-12-12 LAB
ABSOLUTE EOS #: 0.25 K/UL (ref 0–0.4)
ABSOLUTE IMMATURE GRANULOCYTE: 0 K/UL (ref 0–0.3)
ABSOLUTE LYMPH #: 1.8 K/UL (ref 1–4.8)
ABSOLUTE MONO #: 0.5 K/UL (ref 0.1–0.8)
ANION GAP SERPL CALCULATED.3IONS-SCNC: 11 MMOL/L (ref 9–17)
BASOPHILS # BLD: 1 % (ref 0–2)
BASOPHILS ABSOLUTE: 0.05 K/UL (ref 0–0.2)
BUN BLDV-MCNC: 16 MG/DL (ref 8–23)
CALCIUM SERPL-MCNC: 8.3 MG/DL (ref 8.6–10.4)
CHLORIDE BLD-SCNC: 101 MMOL/L (ref 98–107)
CO2: 14 MMOL/L (ref 20–31)
CREAT SERPL-MCNC: 0.89 MG/DL (ref 0.5–0.9)
EOSINOPHILS RELATIVE PERCENT: 5 % (ref 1–4)
GFR SERPL CREATININE-BSD FRML MDRD: >60 ML/MIN/1.73M2
GLUCOSE BLD-MCNC: 107 MG/DL (ref 65–105)
GLUCOSE BLD-MCNC: 67 MG/DL (ref 70–99)
GLUCOSE BLD-MCNC: 77 MG/DL (ref 65–105)
GLUCOSE BLD-MCNC: 95 MG/DL (ref 65–105)
HCT VFR BLD CALC: 38.2 % (ref 36.3–47.1)
HEMOGLOBIN: 10.9 G/DL (ref 11.9–15.1)
IMMATURE GRANULOCYTES: 0 %
LYMPHOCYTES # BLD: 36 % (ref 24–44)
MCH RBC QN AUTO: 31.6 PG (ref 25.2–33.5)
MCHC RBC AUTO-ENTMCNC: 28.5 G/DL (ref 28.4–34.8)
MCV RBC AUTO: 110.7 FL (ref 82.6–102.9)
MONOCYTES # BLD: 10 % (ref 1–7)
MORPHOLOGY: ABNORMAL
NRBC AUTOMATED: 0 PER 100 WBC
PDW BLD-RTO: 13.2 % (ref 11.8–14.4)
PLATELET # BLD: 133 K/UL (ref 138–453)
PMV BLD AUTO: 9.5 FL (ref 8.1–13.5)
POTASSIUM SERPL-SCNC: 4.7 MMOL/L (ref 3.7–5.3)
RBC # BLD: 3.45 M/UL (ref 3.95–5.11)
SEG NEUTROPHILS: 48 % (ref 36–66)
SEGMENTED NEUTROPHILS ABSOLUTE COUNT: 2.4 K/UL (ref 1.8–7.7)
SODIUM BLD-SCNC: 126 MMOL/L (ref 135–144)
WBC # BLD: 5 K/UL (ref 3.5–11.3)

## 2022-12-12 PROCEDURE — 6370000000 HC RX 637 (ALT 250 FOR IP)

## 2022-12-12 PROCEDURE — 6370000000 HC RX 637 (ALT 250 FOR IP): Performed by: NURSE PRACTITIONER

## 2022-12-12 PROCEDURE — 1200000000 HC SEMI PRIVATE

## 2022-12-12 PROCEDURE — 6360000002 HC RX W HCPCS: Performed by: NURSE PRACTITIONER

## 2022-12-12 PROCEDURE — 6360000002 HC RX W HCPCS

## 2022-12-12 PROCEDURE — 82947 ASSAY GLUCOSE BLOOD QUANT: CPT

## 2022-12-12 PROCEDURE — 85025 COMPLETE CBC W/AUTO DIFF WBC: CPT

## 2022-12-12 PROCEDURE — 97535 SELF CARE MNGMENT TRAINING: CPT

## 2022-12-12 PROCEDURE — 97163 PT EVAL HIGH COMPLEX 45 MIN: CPT

## 2022-12-12 PROCEDURE — 97530 THERAPEUTIC ACTIVITIES: CPT

## 2022-12-12 PROCEDURE — 51701 INSERT BLADDER CATHETER: CPT

## 2022-12-12 PROCEDURE — 36415 COLL VENOUS BLD VENIPUNCTURE: CPT

## 2022-12-12 PROCEDURE — 6370000000 HC RX 637 (ALT 250 FOR IP): Performed by: STUDENT IN AN ORGANIZED HEALTH CARE EDUCATION/TRAINING PROGRAM

## 2022-12-12 PROCEDURE — 51798 US URINE CAPACITY MEASURE: CPT

## 2022-12-12 PROCEDURE — 97166 OT EVAL MOD COMPLEX 45 MIN: CPT

## 2022-12-12 PROCEDURE — 80048 BASIC METABOLIC PNL TOTAL CA: CPT

## 2022-12-12 PROCEDURE — 2580000003 HC RX 258

## 2022-12-12 RX ORDER — SODIUM CHLORIDE 1000 MG
2 TABLET, SOLUBLE MISCELLANEOUS
Status: COMPLETED | OUTPATIENT
Start: 2022-12-12 | End: 2022-12-14

## 2022-12-12 RX ORDER — ENOXAPARIN SODIUM 100 MG/ML
30 INJECTION SUBCUTANEOUS 2 TIMES DAILY
Status: DISCONTINUED | OUTPATIENT
Start: 2022-12-12 | End: 2022-12-15

## 2022-12-12 RX ORDER — CIPROFLOXACIN 500 MG/1
250 TABLET, FILM COATED ORAL 2 TIMES DAILY
Qty: 20 TABLET | Refills: 0 | Status: SHIPPED | OUTPATIENT
Start: 2022-12-12 | End: 2022-12-22

## 2022-12-12 RX ADMIN — CEFTRIAXONE SODIUM 1000 MG: 10 INJECTION, POWDER, FOR SOLUTION INTRAVENOUS at 11:48

## 2022-12-12 RX ADMIN — SODIUM CHLORIDE TAB 1 GM 2 G: 1 TAB at 11:48

## 2022-12-12 RX ADMIN — TRAMADOL HYDROCHLORIDE 50 MG: 50 TABLET, COATED ORAL at 19:57

## 2022-12-12 RX ADMIN — DONEPEZIL HYDROCHLORIDE 5 MG: 5 TABLET, FILM COATED ORAL at 19:43

## 2022-12-12 RX ADMIN — SODIUM CHLORIDE TAB 1 GM 2 G: 1 TAB at 08:26

## 2022-12-12 RX ADMIN — ATENOLOL 50 MG: 50 TABLET ORAL at 08:26

## 2022-12-12 RX ADMIN — POLYETHYLENE GLYCOL 3350 17 G: 17 POWDER, FOR SOLUTION ORAL at 08:30

## 2022-12-12 RX ADMIN — GABAPENTIN 100 MG: 100 CAPSULE ORAL at 05:59

## 2022-12-12 RX ADMIN — SENNOSIDES 8.6 MG: 8.6 TABLET, COATED ORAL at 19:43

## 2022-12-12 RX ADMIN — SODIUM CHLORIDE TAB 1 GM 2 G: 1 TAB at 17:02

## 2022-12-12 RX ADMIN — OXYCODONE HYDROCHLORIDE AND ACETAMINOPHEN 500 MG: 500 TABLET ORAL at 08:26

## 2022-12-12 RX ADMIN — ENOXAPARIN SODIUM 30 MG: 100 INJECTION SUBCUTANEOUS at 20:53

## 2022-12-12 RX ADMIN — ENOXAPARIN SODIUM 30 MG: 100 INJECTION SUBCUTANEOUS at 08:26

## 2022-12-12 RX ADMIN — PANTOPRAZOLE SODIUM 40 MG: 40 TABLET, DELAYED RELEASE ORAL at 17:02

## 2022-12-12 RX ADMIN — Medication 1 TABLET: at 08:27

## 2022-12-12 RX ADMIN — TRAMADOL HYDROCHLORIDE 50 MG: 50 TABLET, COATED ORAL at 13:57

## 2022-12-12 RX ADMIN — MIRTAZAPINE 15 MG: 15 TABLET, FILM COATED ORAL at 19:43

## 2022-12-12 RX ADMIN — LEVOTHYROXINE SODIUM 100 MCG: 100 TABLET ORAL at 05:59

## 2022-12-12 RX ADMIN — DOXAZOSIN 1 MG: 1 TABLET ORAL at 08:26

## 2022-12-12 RX ADMIN — FERROUS SULFATE TAB EC 325 MG (65 MG FE EQUIVALENT) 325 MG: 325 (65 FE) TABLET DELAYED RESPONSE at 08:26

## 2022-12-12 RX ADMIN — PANTOPRAZOLE SODIUM 40 MG: 40 TABLET, DELAYED RELEASE ORAL at 05:59

## 2022-12-12 ASSESSMENT — PAIN SCALES - GENERAL
PAINLEVEL_OUTOF10: 0
PAINLEVEL_OUTOF10: 9
PAINLEVEL_OUTOF10: 0
PAINLEVEL_OUTOF10: 7

## 2022-12-12 ASSESSMENT — PAIN DESCRIPTION - ORIENTATION: ORIENTATION: MID

## 2022-12-12 ASSESSMENT — PAIN DESCRIPTION - LOCATION
LOCATION: NECK;BACK
LOCATION: NECK

## 2022-12-12 ASSESSMENT — PAIN DESCRIPTION - DESCRIPTORS: DESCRIPTORS: ACHING

## 2022-12-12 NOTE — TELEPHONE ENCOUNTER
----- Message from BYRON Mohamud - CNP sent at 12/12/2022  7:44 AM EST -----  Regarding: FW: Follow up 4-6 wekeven wiht cervical XR's will need bone growth stimultor  Can we please set up with bone growth stim per Dr Irma Pena? Thanks  ----- Message -----  From: BYRON Kingsley - TRENT  Sent: 12/11/2022   4:36 PM EST  To: BYRON Razo - CNP  Subject: Follow up 4-6 wekeven wiht cervical XR's will n#    Hi,    She will need seen 4-6 weeks with repeat imaging. I did not known how to order bone growth stimulator  wanted her to have this.  C2 and C4 Cervical fractures       Thanks     Edmond Araujo

## 2022-12-12 NOTE — DISCHARGE INSTR - COC
Continuity of Care Form    Patient Name: Elmira Collet   :  1932  MRN:  [id number]    Admit date:  12/10/2022  Discharge date:  2022    Code Status Order: DNR-CCA   Advance Directives:     Admitting Physician:  Dipti Jackson MD  PCP: Theresa Randall MD    Discharging Nurse: [facility] Unit/Room#: 2274/1741-73  Discharging Unit Phone Number: [phone]    Emergency Contact:   Extended Emergency Contact Information  Primary Emergency Contact: Maeve Love  Address: [address] [place] [address], [facility] [address] Mayra Bless of [address] [place] [address] Phone: [phone]  Mobile Phone: [phone]  Relation: Child  Secondary Emergency Contact: Kj  States of [address] [place] [address] Phone: [phone]  Mobile Phone: [phone]  Relation: Child    Past Surgical History:  Past Surgical History:   Procedure Laterality Date    BLADDER REPAIR      BREAST SURGERY      left mastectomy    CATARACT REMOVAL WITH IMPLANT      CHOLECYSTECTOMY      LAPAROTOMY N/A 10/17/2022    LAPAROTOMY EXPLORATORY WITH SMALL BOWL RESECTION AND CENTRAL LINE PLACEMENT performed by Jamal Tapia DO at [address] [facility].       Immunization History:   Immunization History   Administered Date(s) Administered    COVID-19, PFIZER GRAY top, DO NOT Dilute, (age 15 y+), IM, 30 mcg/0.3 mL 2022    COVID-19, PFIZER PURPLE top, DILUTE for use, (age 15 y+), 30mcg/0.3mL 2021, 2021, 10/02/2021    Influenza Virus Vaccine 2015, 2019    Influenza, AFLURIA (age 1 yrs+), FLUZONE, (age 10 mo+), MDV, 0.5mL 10/28/2016, 2017    Influenza, FLUARIX, FLULAVAL, FLUZONE (age 10 mo+) AND AFLURIA, (age 1 y+), PF, 0.5mL 2020, 2022    Influenza, FLUZONE (age 72 y+), High Dose, 0.7mL 10/02/2021    Influenza, Triv, inactivated, subunit, adjuvanted, IM (Fluad 65 yrs and older) 2019    Pneumococcal Conjugate 13-valent ([id number]) 02/15/2017    Pneumococcal Polysaccharide ([id number]) 2010 Active Problems:  Patient Active Problem List   Diagnosis Code    Hypertension, benign essential, goal below 140/90 I10    Back pain, chronic M54.9, G89.29    Hearing loss H91.90    Obesity (BMI 30.0-34. 9) E66.9    Hypothyroidism E03.9    Chronic atrial fibrillation ([facility]) I48.20    History of breast cancer Z85.3    Dementia with behavioral disturbance F03.918    Abdominal pain R10.9    BMI 28.0-28.9,adult Z68.28    Hallucination, visual R44.1    Closed displaced fracture of middle phalanx of right middle finger S62.622A    Perforated viscus R19.8    Bowel perforation ([facility]) K63.1    Open abdominal wall wound S31.109A    Cervical compression fracture, initial encounter ([facility]) S12. 9XXA       Isolation/Infection:   Isolation            No Isolation          Patient Infection Status       None to display            Nurse Assessment:  Last Vital Signs: BP (!) 146/92   Pulse 71   Temp 97.7 °F (36.5 °C) (Temporal)   Resp 15   Ht 4' 11\" (1.499 m)   Wt 110 lb (49.9 kg)   SpO2 94%   BMI 22.22 kg/m²     Last documented pain score (0-10 scale): Pain Level: 9  Last Weight:   Wt Readings from Last 1 Encounters:   12/10/22 110 lb (49.9 kg)     Mental Status:  alert    IV Access:  - None    Nursing Mobility/ADLs:  Walking   Dependent  Transfer  Dependent  Bathing  Dependent  Dressing  Dependent  Toileting  Dependent  Feeding  Dependent  Med Admin  Dependent  Med Delivery   whole    Wound Care Documentation and Therapy:  Wound 12/05/22 Abdomen Lower;Mid #1 (Active)   Wound Image   12/05/22 1333   Wound Etiology Non-Healing Surgical 12/12/22 0400   Dressing Status Clean;Dry; Intact 12/12/22 1228   Wound Cleansed Cleansed with saline 12/10/22 2100   Dressing/Treatment Moist to dry;Packing;ABD 12/12/22 0400   Wound Length (cm) 2.7 cm 12/05/22 1333   Wound Width (cm) 1.5 cm 12/05/22 1333   Wound Depth (cm) 2.1 cm 12/05/22 1333   Wound Surface Area (cm^2) 4.05 cm^2 12/05/22 1333   Wound Volume (cm^3) 8.505 cm^3 12/05/22 1333 Undermining Starts ___ O'Clock 1200 12/05/22 1333   Undermining Ends___ O'Clock 1200 12/05/22 1333   Undermining Maxium Distance (cm) Tom@Experiment 12/05/22 1333   Wound Assessment Granulation tissue;Pink/red 12/12/22 0400   Drainage Amount Small 12/12/22 0400   Drainage Description Yellow;Serosanguinous 12/12/22 0400   Odor None 12/12/22 0400   [place]-wound Assessment Blanchable erythema 12/12/22 0400   Margins Unattached edges 12/12/22 0400   Wound Thickness Description not for Pressure Injury Full thickness 12/05/22 1333   Number of days: 7       Incision 10/17/22 Abdomen Medial;Upper (Active)   Number of days: 56        Elimination:  Continence: Bowel: Yes  Bladder: Yes  Urinary Catheter: None   Colostomy/Ileostomy/Ileal Conduit: No       Date of Last BM: ***    Intake/Output Summary (Last 24 hours) at 12/12/2022 1409  Last data filed at 12/12/2022 1228  Gross per 24 hour   Intake 921.25 ml   Output 1250 ml   Net -328.75 ml     I/O last 3 completed shifts: In: 1778.8 [P.O.:50; I.V.:1728.8]  Out: 2650 [Urine:2650]    Safety Concerns: At Risk for Falls    Impairments/Disabilities:      None    Nutrition Therapy:  Current Nutrition Therapy:   Easy to chew     Routes of Feeding: Oral  Liquids: Thin Liquids  Daily Fluid Restriction: no  Last Modified Barium Swallow with Video (Video Swallowing Test): not done    Treatments at the Time of Hospital Discharge:   Respiratory Treatments: n/a  Oxygen Therapy:  is not on home oxygen therapy.   Ventilator:    - No ventilator support    Rehab Therapies: Physical Therapy and Occupational Therapy  Weight Bearing Status/Restrictions: No weight bearing restrictions  Other Medical Equipment (for information only, NOT a DME order):  Aspan C-collar n/a  Other Treatments:     Patient's personal belongings (please select all that are sent with patient):  {CHP DME Belongings:[id number]}    RN SIGNATURE:  {Esignature:[id number]}    CASE MANAGEMENT/SOCIAL WORK SECTION    Inpatient Status Date: ***    Readmission Risk Assessment Score:  Readmission Risk              Risk of Unplanned Readmission:  19           Discharging to Facility/ Agency   Name: Lan Moreno  Address:  Phone:  Fax:    Dialysis Facility (if applicable)   Name:  Address:  Dialysis Schedule:  Phone:  Fax:    / signature: Electronically signed by Marsh Landau, RN on 12/14/22 at 5:55 PM EST    PHYSICIAN SECTION    Prognosis: Good    Condition at Discharge: Stable    Rehab Potential (if transferring to Rehab): Good    Recommended Labs or Other Treatments After Discharge:     Straight cath as needed every 6 hours and/or if bladder scan is greater than 350mls. Your Neck Brace should be worn at all times. Follow up with Neurosurgery as directed in 4-6 weeks with repeat C-Spine xrays. Physician Certification: I certify the above information and transfer of Chip Ansari  is necessary for the continuing treatment of the diagnosis listed and that she requires [place] for less 30 days.      Update Admission H&P: No change in H&P    PHYSICIAN SIGNATURE:  Electronically signed by BYRON Sarmiento CNP on 12/12/22 at 2:56 PM EST

## 2022-12-12 NOTE — CARE COORDINATION
SBIRT-  Met with pt this date was awake and answered questions appropriately. Pt denies any drug or alcohol use. Pt also denies having any feelings of depression. No suicidal ideations. Alcohol Screening and Brief Intervention        No results for input(s): ALC in the last 72 hours. Alcohol Pre-screening     (WOMEN ONLY) How many times in the past year have you had 4 or more drinks in a day?: None    Alcohol Screening Audit       Drug Pre-Screening   How many times in the past year have you used a recreational drug or used a prescription medication for nonmedical reasons?: None    Drug Screening DAST       Mood Pre-Screening (PHQ-2)  During the past two weeks, have you been bothered by little interest or pleasure in doing things?: No  During the past two weeks, have you been bothered by feeling down, depressed, or hopeless?: No    Mood Pre-Screening (PHQ-9)         I have interviewed Jess Marques, [phone] regarding  Her alcohol consumption/drug use and risk for excessive use. Screenings were negative. Patient  N/A intervention at this time.      Deferred []    Completed on: 12/12/2022   [facility]

## 2022-12-12 NOTE — PROGRESS NOTES
Occupational Therapy  [facility] [address] [facility]  Occupational Therapy Initial Assessment    Name: Vitaliy Perdomo  : 1932  MRN: [id number]  Date of Service: 2022    Discharge Recommendations: Pt unsafe to return to PLOF even with some assistance from family, poor sitting tolerance, extreme neck pain, poor ADL/IADL performance, etc.  Patient would benefit from continued therapy after discharge          Patient Diagnosis(es): The primary encounter diagnosis was Fall, subsequent encounter. Diagnoses of Other closed nondisplaced fracture of second cervical vertebra, sequela, Other closed nondisplaced fracture of fourth cervical vertebra, initial encounter ([facility] and Cervical compression fracture, initial encounter [facility]) were also pertinent to this visit. Past Medical History:  has a past medical history of Atrial fibrillation, Back pain, chronic, [facility] GERD (gastroesophageal reflux disease), Hiatal hernia, History of breast cancer, History of glaucoma, Hypertension, benign essential, goal below 140/90, and Hypothyroid. Past Surgical History:  has a past surgical history that includes Cholecystectomy; Breast surgery; Cataract removal with implant; bladder repair; and laparotomy (N/A, 10/17/2022). Assessment   Performance deficits / Impairments: Decreased functional mobility ; Decreased endurance;Decreased ADL status; Decreased balance;Decreased high-level IADLs;Decreased cognition;Decreased safe awareness;Decreased strength;Decreased ROM; Decreased coordination;Decreased fine motor control  Prognosis: Fair  Decision Making: High Complexity  REQUIRES OT FOLLOW-UP: Yes  Activity Tolerance  Activity Tolerance: Patient limited by pain; Patient limited by fatigue;Treatment limited secondary to decreased cognition        Plan   Occupational Therapy Plan  Times Per Week: 3-4x     Restrictions  Restrictions/Precautions  Restrictions/Precautions: Fall Risk, General Precautions  Required Braces or Orthoses?: Yes  Required Braces or Orthoses  Cervical: c-collar (at all times)  Position Activity Restriction  Other position/activity restrictions: up with assist. acute C2, C4 fracture. C-collar at all times, DNR-CCA    Subjective   General  Patient assessed for rehabilitation services?: Yes  Family / Caregiver Present: No  Diagnosis: C2/4 fx's, Fall, L scalp hematoma, L1 comp fx-no NS intervention per chart  Subjective  Subjective: 10/10 pain when sitting upright unsupport per pt  General Comment  Comments: RN approved OT eval this pm, pt cooperative at times     Social/Functional History  Social/Functional History  Lives With: Daughter  Type of Home: House  Home Layout: Two level, Able to Live on Main level with bedroom/bathroom  Home Access: Level entry  Entrance Stairs - Number of Steps: 2 pt was able to amb the two stairs prior  Entrance Stairs - Rails: Both  Bathroom Shower/Tub: Walk-in shower  Bathroom Toilet: Standard  Bathroom Equipment:  (reports none)  Bathroom Accessibility: Accessible  Home Equipment: Francisca Cohen (pt reports using RW at a baseline.)  Has the patient had two or more falls in the past year or any fall with injury in the past year?: Yes  Receives Help From: Family  ADL Assistance: Independent  Bath: Independent  Dressing: Independent  Grooming: Independent  Feeding: Independent  Toileting: Independent  Homemaking Assistance: Needs assistance  Homemaking Responsibilities: No (dtr performs majority)  Ambulation Assistance: Independent  Transfer Assistance: Independent  Active : No  Patient's  Info: dtr drives  Mode of Transportation: [facility]  Occupation: Retired  Leisure & Hobbies: walking, cooking  Additional Comments: Pt reports dtr works but would be able to assist when not at work.  Grandchildren also assist.       Objective   SpO2: 94 %  O2 Device: None (Room air)             Safety Devices  Type of Devices: Left in bed;Call light within reach;Nurse notified; Patient at risk for falls  Restraints  Restraints Initially in Place: No  Bed Mobility Training  Bed Mobility Training: Yes  Supine to Sit: Maximum assistance  Sit to Supine: Maximum assistance  Scooting: Other (comment) (SACHI as pt only sat for ~1 second total)  Balance  Sitting: Impaired (As pt only sat up for ~1 second, max A given for assist level, limited by pain in neck)  Standing:  (SACHI)  Transfer Training  Transfer Training: Yes  Sit to Stand: Other (comment) (SACHI as pt demo'd extremely poor sitting tolerance)     AROM: Generally decreased, functional  PROM: Within functional limits  Coordination: Generally decreased, functional (Significant coordination deficits at BUE's, pt is R-handed, consistently missing mouth with utensils)  Tone: Normal  Sensation: Impaired (Numbness/pain in R hand at one point in session)  ADL  Feeding: Moderate assistance;Setup;Verbal cueing; Increased time to complete;Bringing food to mouth assist;Adaptive utensils (comment)  Feeding Skilled Clinical Factors: Pt attempting to feed self at end of session, pt is R-handed, major coordination deficits, pt often spilling food onto c-collar, lacking enough R shoulder/elbow flexion-[place] assist provided, built-up handle provided which improved grasp over spoon, pt often tipping cups of food towards lap with LUE  Grooming: Maximum assistance;Setup;Verbal cueing; Increased time to complete  UE Bathing: Maximum assistance;Setup;Verbal cueing; Increased time to complete  LE Bathing: Maximum assistance; Increased time to complete;Verbal cueing;Setup  UE Dressing: Maximum assistance;Setup; Increased time to complete;Verbal cueing  LE Dressing: Dependent/Total  Toileting: Dependent/Total  Additional Comments: Pt limited by advanced age/neck pain/BUE coordination deficits and weakness, pt often stubborn and requiring encouragement from therapist, sat EOB ~1 second total this date     Activity Tolerance  Activity Tolerance: Patient limited by pain  Activity Tolerance Comments: Limited secondary to 10/10 cervical pain. Vision  Vision: Impaired (Poor overall vision)  Hearing  Hearing: Exceptions to [facility]  Hearing Exceptions: Hard of hearing/hearing concerns  Cognition  Overall Cognitive Status: Exceptions  Following Commands: Follows multistep commands with increased time  Attention Span: Attends with cues to redirect  Safety Judgement: Decreased awareness of need for assistance  Problem Solving: Assistance required to identify errors made;Assistance required to generate solutions  Insights: Decreased awareness of deficits  Initiation: Requires cues for some  Sequencing: Requires cues for some  Cognition Comment: Pt overestimating abilities consistently  Orientation  Overall Orientation Status: Within Functional Limits                  Education Given To: Patient  Education Provided: Role of Therapy;Plan of Care;ADL Adaptive Strategies;Transfer Training  Education Provided Comments: Importance of OT/activity  Education Method: Verbal;Demonstration  Barriers to Learning: Cognition;Vision; Hearing  Education Outcome: Continued education needed                     AM-PAC Score        AM-PAC Inpatient Daily Activity Raw Score: 10 (12/12/22 1642)  AM-PAC Inpatient ADL T-Scale Score : 27.31 (12/12/22 1642)  ADL Inpatient CMS 0-100% Score: 74.7 (12/12/22 1642)  ADL Inpatient CMS G-Code Modifier : CL (12/12/22 1642)    Goals  Short Term Goals  Time Frame for Short Term Goals: Pt will by discharge  Short Term Goal 1: demo ADL UB bathing/dressing activity at min A, setup, increased time, including c-collar  Short Term Goal 2: demo ADL LB bathing/dressing activity at mod A, AE PRN, and increased time  Short Term Goal 3: demo dynamic unsupported sitting for 5 min+ during func activity at min A  Short Term Goal 4: demo [facility] func activity for 7 min+ with [place] assist-min A, with <3 errors       Therapy Time   Individual Concurrent Group Co-treatment Time In 1321         Time Out 1343         Minutes 22         Timed Code Treatment Minutes: 15 Minutes       Lisa Hernandez OTR/L

## 2022-12-12 NOTE — PROGRESS NOTES
Physical Therapy  Facility/Department: [address] [facility]  Physical Therapy Initial Assessment    Name: Sherine Velazquez  : 1932  MRN: [id number]  Date of Service: 2022  Chief Complaint   Patient presents with    Fall    Neck Injury      Discharge Recommendations:  Patient would benefit from continued therapy after discharge   PT Equipment Recommendations  Equipment Needed: No (CTA)      Patient Diagnosis(es): The primary encounter diagnosis was Fall, subsequent encounter. Diagnoses of Other closed nondisplaced fracture of second cervical vertebra, sequela, Other closed nondisplaced fracture of fourth cervical vertebra, initial encounter ([facility] and Cervical compression fracture, initial encounter [facility]) were also pertinent to this visit. Past Medical History:  has a past medical history of Atrial fibrillation, Back pain, chronic, Dementia ([facility] [address] [facility] GERD (gastroesophageal reflux disease), Hiatal hernia, History of breast cancer, History of glaucoma, Hypertension, benign essential, goal below 140/90, and Hypothyroid. Past Surgical History:  has a past surgical history that includes Cholecystectomy; Breast surgery; Cataract removal with implant; bladder repair; and laparotomy (N/A, 10/17/2022). Assessment   Body Structures, Functions, Activity Limitations Requiring Skilled Therapeutic Intervention: Decreased functional mobility ; Decreased strength;Decreased endurance;Decreased balance; Increased pain  Assessment: Pt with mobility deficits requiring max-A to perform bed mobility, pt only able to tolerate sitting at the EOB x~8 seconds this date before requiring return to supine secondary to 10/10 upper cervical pain. Pt would benefit from additional PT upon discharge to maximize functional independence. Pt would be unsafe to return to prior living arrangements upon discharge.   Therapy Prognosis: Good  Decision Making: High Complexity  Requires PT Follow-Up: Yes  Activity Tolerance  Activity Tolerance: Patient limited by pain  Activity Tolerance Comments: Limited secondary to 10/10 cervical pain. Plan   Physcial Therapy Plan  General Plan:  (5-6x/week)  Current Treatment Recommendations: Strengthening, Balance training, Functional mobility training, Transfer training, Endurance training, Home exercise program, Safety education & training, Patient/Caregiver education & training, Equipment evaluation, education, & procurement, Wheelchair mobility training, Therapeutic activities, Positioning  Safety Devices  Type of Devices: Left in bed, Call light within reach, Nurse notified  Restraints  Restraints Initially in Place: No     Restrictions  Restrictions/Precautions  Required Braces or Orthoses?: Yes  Required Braces or Orthoses  Cervical: c-collar (at all times)  Position Activity Restriction  Other position/activity restrictions: up with assist. acute C2, C4 fracture. C-collar at all times     Subjective   General  Patient assessed for rehabilitation services?: Yes  Response To Previous Treatment: Not applicable  Family / Caregiver Present: No  Follows Commands: Within Functional Limits  Subjective  Subjective: Pt supine in bed and agreeable to therapy, RN agreeable to therapy. Pt pleasant and cooperative throughout. Pt reports 10/10 neck pain with mobility this date.          Social/Functional History  Social/Functional History  Lives With: Daughter  Type of Home: House  Home Layout: Two level, Able to Live on Main level with bedroom/bathroom  Home Access: Level entry  Entrance Stairs - Number of Steps: 2 pt was able to amb the two stairs prior  Entrance Stairs - Rails: Both  Bathroom Shower/Tub: Walk-in shower  Bathroom Toilet: Standard  Bathroom Equipment:  (reports none)  Bathroom Accessibility: Accessible  Home Equipment: Taff[employer] Junk, rolling, Wheelchair-manual (pt reports using RW at a baseline.)  Has the patient had two or more falls in the past year or any fall with injury in the past year?: Yes  Manuelito Loera Help From: Family  ADL Assistance: Independent  Bath: Independent  Dressing: Independent  Grooming: Independent  Feeding: Independent  Toileting: Independent  Homemaking Assistance: Needs assistance  Homemaking Responsibilities: No (dtr performs majority)  Ambulation Assistance: Independent  Transfer Assistance: Independent  Active : No  Patient's  Info: dtr drives  Mode of Transportation: SUV  Occupation: Retired  Leisure & Hobbies: walking, cooking  Additional Comments: Pt reports dtr works but would be able to assist when not at work. Grandchildren also assist.  Vision/Hearing  Vision  Vision: Impaired (minimal vision)  Hearing  Hearing: Within functional limits    Cognition   Cognition  Overall Cognitive Status: Exceptions  Following Commands: Follows multistep commands with increased time  Attention Span: Attends with cues to redirect  Safety Judgement: Decreased awareness of need for assistance  Problem Solving: Assistance required to identify errors made;Assistance required to generate solutions  Insights: Decreased awareness of deficits  Initiation: Requires cues for some  Sequencing: Requires cues for some     Objective                 AROM RLE (degrees)  RLE AROM: WFL  AROM LLE (degrees)  LLE AROM : WFL  AROM RUE (degrees)  RUE AROM : WFL  RUE General AROM: able to achieve 90 degrees shoulder flexion. AROM LUE (degrees)  LUE AROM : WFL  LUE General AROM: able to achieve 90 degrees shoulder flexion.   Strength RLE  Strength RLE: WFL  Comment: Grossly 3+/5  Strength LLE  Strength LLE: WFL  Comment: Grossly 3+/5  Strength RUE  Strength RUE: WFL  Comment: Grossly 3+/5  Strength LUE  Strength LUE: WFL  Comment: Grossly 3+/5           Bed mobility  Supine to Sit: Maximum assistance  Sit to Supine: Maximum assistance  Scooting: Maximal assistance  Bed Mobility Comments: HOB elevated ~35 degrees, upon reaching supine, pt unable to maintain sitting EOB secondary to 10/10 upper cervical pain.  Transfers  Comment: SACHI d/t unable to tolerate sitting EOB. Ambulation  More Ambulation?: No  Stairs/Curb  Stairs?: No     Balance  Posture: Fair  Sitting - Static: Poor  Sitting - Dynamic: Poor  Comments: sitting balance assessed at the EOB. Pt requires max-A to maintain static sitting at the EOB                                                        AM-PAC Score  AM-PAC Inpatient Mobility Raw Score : 8 (12/12/22 1335)  AM-PAC Inpatient T-Scale Score : 28.52 (12/12/22 1335)  Mobility Inpatient CMS 0-100% Score: 86.62 (12/12/22 1335)  Mobility Inpatient CMS G-Code Modifier : CM (12/12/22 1335)            Goals  Short Term Goals  Time Frame for Short Term Goals: 14 visits  Short Term Goal 1: Pt will perform supine<>sit transfer with min-A. Short Term Goal 2: Pt will perform rolling R/L with SBA. Short Term Goal 3: Pt will perform a functional transfer with min-A. Short Term Goal 4: Pt will perform 300 feet of manual wheelchair propulsion with CGA. Short Term Goal 5: Pt will demonstrate fair sitting balance to decrease fall risk.   Additional Goals?: No       Education  Patient Education  Education Given To: Patient  Education Provided: Role of Therapy;Transfer Training;Plan of Care  Education Method: Verbal  Barriers to Learning: Cognition  Education Outcome: Verbalized understanding;Continued education needed      Therapy Time   Individual Concurrent Group Co-treatment   Time In 0836         Time Out 0902         Minutes 26         Timed Code Treatment Minutes: [address], PT

## 2022-12-12 NOTE — PROGRESS NOTES
Comprehensive Nutrition Assessment    Type and Reason for Visit:  Positive Nutrition Screen    Nutrition Recommendations/Plan:   Continue frozen ONS  Continue current diet  Monitor weight, labs and intake. Malnutrition Assessment:  Malnutrition Status: Moderate malnutrition (12/12/22 1505)    Context:  Acute Illness     Findings of the 6 clinical characteristics of malnutrition:  Energy Intake:  Mild decrease in energy intake (Comment)  Weight Loss:  Greater than 7.5% over 3 months     Body Fat Loss: Moderate body fat loss Orbital   Muscle Mass Loss:  Mild muscle mass loss Temples (temporalis), Clavicles (pectoralis & deltoids)  Fluid Accumulation:  No significant fluid accumulation     Strength:  Not Performed    Nutrition Assessment:    Patient seen for weight loss/poor appetite. Patient admitted for cervical fracture resulting from fall. PMH dementia, bowel resection. Patient not appropriately responding to questions, answering \"no\" to everything. Per EHR, patient with 13 lb (11%) weight loss x 2 months. Also per EHR, intake was 25-50% of breakfast and lunch. Only estimated weight, actual weight ordered. Nutrition Related Findings:    Labs/meds reviewed. Wound Type: Surgical Incision (nonhealing surgical wound to abdomen)       Current Nutrition Intake & Therapies:    Average Meal Intake: 26-50%  Average Supplements Intake: 26-50%  ADULT DIET; Easy to Chew  ADULT ORAL NUTRITION SUPPLEMENT; Breakfast, Lunch, Dinner; Frozen Oral Supplement    Anthropometric Measures:  Height: 4' 11\" (149.9 cm)  Ideal Body Weight (IBW): 95 lbs (43 kg)       Current Body Weight: 110 lb (49.9 kg), 115.8 % IBW.  Weight Source: Bed Scale  Current BMI (kg/m2): 22.2                          BMI Categories: Normal Weight (BMI 22.0 to 24.9) age over 72    Estimated Daily Nutrient Needs:  Energy Requirements Based On: Kcal/kg  Weight Used for Energy Requirements: Current  Energy (kcal/day): 0705-4427 kcal/day  Weight Used for Protein Requirements: Current  Protein (g/day): 60 gm/day  Method Used for Fluid Requirements: 1 ml/kcal  Fluid (ml/day): 1500 mL or per MD    Nutrition Diagnosis:   Inadequate oral intake related to cognitive or neurological impairment as evidenced by poor intake prior to admission, weight loss    Nutrition Interventions:   Food and/or Nutrient Delivery: Continue Current Diet, Continue Oral Nutrition Supplement  Nutrition Education/Counseling: No recommendation at this time  Coordination of Nutrition Care: Continue to monitor while inpatient       Goals:     Goals: Meet at least 75% of estimated needs, prior to discharge       Nutrition Monitoring and Evaluation:   Behavioral-Environmental Outcomes: None Identified  Food/Nutrient Intake Outcomes: Food and Nutrient Intake, Supplement Intake  Physical Signs/Symptoms Outcomes: Biochemical Data, GI Status, Skin, Weight    Discharge Planning:     Too soon to determine     Sharmin Marcano, [address]  Contact: [phone]

## 2022-12-12 NOTE — PROGRESS NOTES
Physician Progress Note      PATIENT:               De Peacock  CSN #:                  [id number]  :                       1932  ADMIT DATE:       12/10/2022 3:57 PM  DISCH DATE:  RESPONDING  PROVIDER #:        Laurie LOZANO APRN - CNP          QUERY TEXT:    Pt admitted with cervical fractures. Pt noted to have elevated creatinine on   admission. If possible, please document in the progress notes and discharge   summary if you are evaluating and/or treating any of the following: The medical record reflects the following:  Risk Factors: UTI, urinary retention  Clinical Indicators: creatinine on admission was 1.34 down to 0.89 on ,   last creat in Epic was 0.65 on 10/24  Treatment: IV fluids, IV abx, straight cath, labs, monitoring    Defined by Kidney Disease Improving Global Outcomes (KDIGO) clinical practice   guideline for acute kidney injury:  -Increase in SCr by greater than or equal to 0.3 mg/dl within 48 hours; or  -Increase or decrease in SCr to greater than or equal to 1.5 times baseline,   which is known or presumed to have occurred within the prior 7 days; or  -Urine volume < 0.5ml/kg/h for 6 hours    Thank you, Audra Saavedra, CDI  email - [email or web address]  cell- [phone]  office hours M-F-7A-3P  Options provided:  -- Acute kidney failure, resolved  -- Acute kidney injury, resolved  -- Other - I will add my own diagnosis  -- Disagree - Not applicable / Not valid  -- Disagree - Clinically unable to determine / Unknown  -- Refer to Clinical Documentation Reviewer    PROVIDER RESPONSE TEXT:    This patient has an Acute kidney injury which is now resolved. Query created by: Vineet Faria on 2022 8:43 AM      QUERY TEXT:    Pt admitted with C2 C4 fx s/p [facility]. Pt noted to have osteopenia. If possible,   please document in progress notes and discharge summary if you are evaluating   and/or treating any of the following:     The medical record reflects the following:  Risk Factors: nondisplaced C2 and C4 fractures following fall from standing  Clinical Indicators: per trauma notes fall from standing, no LOC, nondisplaced   C2 and C4 fractures, per XR report \"The bones are osteopenic\"  Treatment: CTA, MRI, neurosurg consult, Aspen collar, strict bedrest, HOB to   30 degrees, Will need bone growth stimulator    Thank you, DAVID Mckeon  email - [email or web address]  cell- [phone]  office hours M-F-7A-3P  Options provided:  -- Pathological cervical fracture  -- Pathological cervical fracture due to osteopenia  -- Pathological cervical fracture due to osteopenia following fall which would   not usually break a normal, healthy bone  -- Osteoporotic cervical Fracture  -- Osteoporotic cervical fracture following fall which would not usually break   a normal, healthy bone  -- Traumatic cervical fracture  -- Other - I will add my own diagnosis  -- Disagree - Not applicable / Not valid  -- Disagree - Clinically unable to determine / Unknown  -- Refer to Clinical Documentation Reviewer    PROVIDER RESPONSE TEXT:    This patient has a traumatic cervical fracture.     Query created by: La Officer on 12/12/2022 8:49 AM      Electronically signed by:  Aakash Rivera CNP 12/12/2022 1:01 PM

## 2022-12-12 NOTE — PROGRESS NOTES
Patient bladder scanned for more than 500. Patient straight cathed and 635 ml clear yellow urine returned. Patient tolerated well. Patient daughter at bedside and requesting to speak with MD trauma resident paged to come answer familys questions.      [address] resident here speaking with family and question answered/

## 2022-12-12 NOTE — CARE COORDINATION
Post Acute Facility/Agency List     Provided child Garret Elder with the following list, the list includes the overall star ratings obtained from CMS per the Medicare Web site (www.Medicare.gov):     [] [address]  [] Acute Inpatient Rehabilitation Facilities  [x] Skilled Nursing Facilities  [] Home Care    Provided verbal instructions on how to utilize the QR Code to obtain additional detailed star ratings from www. Medicare. gov     offered to print and provide the detailed list:    []Accepted   [x]Declined    Spoke with Garret Elder regarding SNF choice. Garret Elder [place] to [place] and [facility]. Referrals sent as requested.

## 2022-12-12 NOTE — PLAN OF CARE
Neurosurgery    Was paged regarding L1 compression fracture found on CT lumbar spine   Patient seen this morning  She has not been out of bed though she did sit at [facility] with therapy  She denies any lumbar tenderness on palpation but is reporting cervical tenderness and pain  5/5 BLE    No further interventions for L1 compression fracture  Activity as tolerated   Patient can follow up as scheduled       Electronically signed by BYRON Moreira NP on 12/12/2022 at 12:31 PM

## 2022-12-12 NOTE — PLAN OF CARE
Problem: Discharge Planning  Goal: Discharge to home or other facility with appropriate resources  Outcome: Progressing     Problem: Pain  Goal: Verbalizes/displays adequate comfort level or baseline comfort level  Outcome: Progressing     Problem: Skin/Tissue Integrity  Goal: Absence of new skin breakdown  Description: 1. Monitor for areas of redness and/or skin breakdown  2. Assess vascular access sites hourly  3. Every 4-6 hours minimum:  Change oxygen saturation probe site  4. Every 4-6 hours:  If on nasal continuous positive airway pressure, respiratory therapy assess nares and determine need for appliance change or resting period.   Outcome: Progressing     Problem: Safety - Adult  Goal: Free from fall injury  Outcome: Progressing  Flowsheets (Taken 12/11/2022 2000)  Free From Fall Injury: Instruct family/caregiver on patient safety     Problem: ABCDS Injury Assessment  Goal: Absence of physical injury  Outcome: Progressing  Flowsheets (Taken 12/11/2022 2000)  Absence of Physical Injury: Implement safety measures based on patient assessment

## 2022-12-12 NOTE — CARE COORDINATION
Transitional planning note: wanda from [facility] called and states they can accept and will start precert. Notified patient's daughter Irvin Brunson and she is agreeable to plan.

## 2022-12-12 NOTE — PROGRESS NOTES
PROGRESS NOTE          PATIENT NAME: Franca Mora  MEDICAL RECORD NO. [id number]  DATE: 12/12/2022    HD: # 2      Patient Active Problem List   Diagnosis    Hypertension, benign essential, goal below 140/90    Back pain, chronic    Hearing loss    Obesity (BMI 30.0-34. 9)    Hypothyroidism    Chronic atrial fibrillation (HCC)    History of breast cancer    Dementia with behavioral disturbance    Abdominal pain    BMI 28.0-28.9,adult    Hallucination, visual    Closed displaced fracture of middle phalanx of right middle finger    Perforated viscus    Bowel perforation (HCC)    Open abdominal wall wound    Cervical compression fracture, initial encounter ([facility])       DIAGNOSIS AND PLAN  C2/4 Fx, L1 Fx, Possible T1 Wedge Fx   UTI w/ Urinary Retention chronic  L Scalp Hematoma  Hyponatremia   Home meds resumed   Ot pt ordered   DNR [facility]   Has chronic wound   UTI was PTA  PLAN:   Up with collar   Dispo planning- SNIF   Fluid restrict for hyponatremia    Add salt tabs    Bmp x 3 days   Dc IVF   Rocephin completion 12/13   Wound care abd    Add dvt prophylaxis   Resume eliquis as OP per her primary cardiologist given frequent falls      Chief Complaint: \"feeling well\"    SUBJECTIVE    Neck pain wihen up with therapy    OBJECTIVE  VITALS:   Vitals:    12/12/22 0703   BP: (!) 141/70   Pulse: 63   Resp: 16   Temp: 97 °F (36.1 °C)   SpO2: 95%     Physical Exam  Constitutional:       Appearance: Normal appearance. Eyes:      Pupils: Pupils are equal, round, and reactive to light. Neck:      Comments: Cervical collar, describes pain when mobile  Cardiovascular:      Pulses: Normal pulses. Pulmonary:      Effort: Pulmonary effort is normal.   Chest:      Chest wall: No tenderness. Abdominal:      General: There is no distension. Palpations: Abdomen is soft. Tenderness: There is no abdominal tenderness.       Comments: Wound packing removed and replaced, wound is small with minimal exudate and no signs of infection   Musculoskeletal:         General: No tenderness (T11-L1 region). Normal range of motion. Cervical back: Tenderness: C3-C4. Right lower leg: No edema. Left lower leg: No edema. Skin:     General: Skin is warm and dry. Neurological:      General: No focal deficit present. Mental Status: She is alert.          LAB:  CBC:   Recent Labs     12/10/22  1620 12/11/22  0323 12/12/22  0625   WBC 8.1 6.2 5.0   HGB 11.3* 10.0* 10.9*   HCT 38.0 32.8* 38.2   .7* 105.1* 110.7*    146 133*       BMP:

## 2022-12-13 LAB
ANION GAP SERPL CALCULATED.3IONS-SCNC: 10 MMOL/L (ref 9–17)
BUN BLDV-MCNC: 11 MG/DL (ref 8–23)
CALCIUM SERPL-MCNC: 8.5 MG/DL (ref 8.6–10.4)
CHLORIDE BLD-SCNC: 104 MMOL/L (ref 98–107)
CO2: 16 MMOL/L (ref 20–31)
CREAT SERPL-MCNC: 0.83 MG/DL (ref 0.5–0.9)
EKG ATRIAL RATE: 83 BPM
EKG P AXIS: 56 DEGREES
EKG P-R INTERVAL: 168 MS
EKG Q-T INTERVAL: 356 MS
EKG QRS DURATION: 64 MS
EKG QTC CALCULATION (BAZETT): 418 MS
EKG R AXIS: 3 DEGREES
EKG T AXIS: 80 DEGREES
EKG VENTRICULAR RATE: 83 BPM
GFR SERPL CREATININE-BSD FRML MDRD: >60 ML/MIN/1.73M2
GLUCOSE BLD-MCNC: 103 MG/DL (ref 65–105)
GLUCOSE BLD-MCNC: 104 MG/DL (ref 65–105)
GLUCOSE BLD-MCNC: 88 MG/DL (ref 70–99)
GLUCOSE BLD-MCNC: 91 MG/DL (ref 65–105)
POTASSIUM SERPL-SCNC: 4.4 MMOL/L (ref 3.7–5.3)
SODIUM BLD-SCNC: 130 MMOL/L (ref 135–144)

## 2022-12-13 PROCEDURE — 82947 ASSAY GLUCOSE BLOOD QUANT: CPT

## 2022-12-13 PROCEDURE — 2580000003 HC RX 258

## 2022-12-13 PROCEDURE — 36415 COLL VENOUS BLD VENIPUNCTURE: CPT

## 2022-12-13 PROCEDURE — 51701 INSERT BLADDER CATHETER: CPT

## 2022-12-13 PROCEDURE — 1200000000 HC SEMI PRIVATE

## 2022-12-13 PROCEDURE — 80048 BASIC METABOLIC PNL TOTAL CA: CPT

## 2022-12-13 PROCEDURE — 51798 US URINE CAPACITY MEASURE: CPT

## 2022-12-13 PROCEDURE — 6370000000 HC RX 637 (ALT 250 FOR IP)

## 2022-12-13 PROCEDURE — 6370000000 HC RX 637 (ALT 250 FOR IP): Performed by: NURSE PRACTITIONER

## 2022-12-13 PROCEDURE — 6360000002 HC RX W HCPCS: Performed by: NURSE PRACTITIONER

## 2022-12-13 PROCEDURE — 6370000000 HC RX 637 (ALT 250 FOR IP): Performed by: STUDENT IN AN ORGANIZED HEALTH CARE EDUCATION/TRAINING PROGRAM

## 2022-12-13 PROCEDURE — 6360000002 HC RX W HCPCS

## 2022-12-13 RX ORDER — IBUPROFEN 800 MG/1
400 TABLET ORAL EVERY 6 HOURS PRN
Status: DISCONTINUED | OUTPATIENT
Start: 2022-12-13 | End: 2022-12-15 | Stop reason: HOSPADM

## 2022-12-13 RX ADMIN — PANTOPRAZOLE SODIUM 40 MG: 40 TABLET, DELAYED RELEASE ORAL at 16:39

## 2022-12-13 RX ADMIN — FERROUS SULFATE TAB EC 325 MG (65 MG FE EQUIVALENT) 325 MG: 325 (65 FE) TABLET DELAYED RESPONSE at 09:31

## 2022-12-13 RX ADMIN — LEVOTHYROXINE SODIUM 100 MCG: 100 TABLET ORAL at 05:01

## 2022-12-13 RX ADMIN — Medication 1 TABLET: at 09:31

## 2022-12-13 RX ADMIN — CEFTRIAXONE SODIUM 1000 MG: 10 INJECTION, POWDER, FOR SOLUTION INTRAVENOUS at 12:03

## 2022-12-13 RX ADMIN — SODIUM CHLORIDE TAB 1 GM 2 G: 1 TAB at 12:06

## 2022-12-13 RX ADMIN — SODIUM CHLORIDE TAB 1 GM 2 G: 1 TAB at 09:31

## 2022-12-13 RX ADMIN — SODIUM CHLORIDE TAB 1 GM 2 G: 1 TAB at 16:39

## 2022-12-13 RX ADMIN — DONEPEZIL HYDROCHLORIDE 5 MG: 5 TABLET, FILM COATED ORAL at 20:48

## 2022-12-13 RX ADMIN — ENOXAPARIN SODIUM 30 MG: 100 INJECTION SUBCUTANEOUS at 20:48

## 2022-12-13 RX ADMIN — SENNOSIDES 8.6 MG: 8.6 TABLET, COATED ORAL at 20:48

## 2022-12-13 RX ADMIN — OXYCODONE HYDROCHLORIDE AND ACETAMINOPHEN 500 MG: 500 TABLET ORAL at 09:32

## 2022-12-13 RX ADMIN — TRAMADOL HYDROCHLORIDE 50 MG: 50 TABLET, COATED ORAL at 10:43

## 2022-12-13 RX ADMIN — ATENOLOL 50 MG: 50 TABLET ORAL at 09:31

## 2022-12-13 RX ADMIN — ENOXAPARIN SODIUM 30 MG: 100 INJECTION SUBCUTANEOUS at 09:32

## 2022-12-13 RX ADMIN — TRAMADOL HYDROCHLORIDE 50 MG: 50 TABLET, COATED ORAL at 04:15

## 2022-12-13 RX ADMIN — POLYETHYLENE GLYCOL 3350 17 G: 17 POWDER, FOR SOLUTION ORAL at 09:32

## 2022-12-13 RX ADMIN — DOXAZOSIN 1 MG: 1 TABLET ORAL at 09:31

## 2022-12-13 RX ADMIN — PANTOPRAZOLE SODIUM 40 MG: 40 TABLET, DELAYED RELEASE ORAL at 05:01

## 2022-12-13 RX ADMIN — MIRTAZAPINE 15 MG: 15 TABLET, FILM COATED ORAL at 20:48

## 2022-12-13 RX ADMIN — TRAMADOL HYDROCHLORIDE 50 MG: 50 TABLET, COATED ORAL at 16:39

## 2022-12-13 ASSESSMENT — PAIN DESCRIPTION - LOCATION: LOCATION: NECK;SHOULDER

## 2022-12-13 ASSESSMENT — PAIN DESCRIPTION - DESCRIPTORS: DESCRIPTORS: ACHING

## 2022-12-13 ASSESSMENT — PAIN - FUNCTIONAL ASSESSMENT: PAIN_FUNCTIONAL_ASSESSMENT: PREVENTS OR INTERFERES SOME ACTIVE ACTIVITIES AND ADLS

## 2022-12-13 ASSESSMENT — PAIN DESCRIPTION - ORIENTATION: ORIENTATION: RIGHT

## 2022-12-13 ASSESSMENT — PAIN SCALES - GENERAL
PAINLEVEL_OUTOF10: 9
PAINLEVEL_OUTOF10: 7
PAINLEVEL_OUTOF10: 7

## 2022-12-13 NOTE — PROGRESS NOTES
PROGRESS NOTE          PATIENT NAME: [address] RECORD NO. [id number]  DATE: 12/13/2022    HD: # 3      Patient Active Problem List   Diagnosis    Hypertension, benign essential, goal below 140/90    Back pain, chronic    Hearing loss    Obesity (BMI 30.0-34. 9)    Hypothyroidism    Chronic atrial fibrillation (HCC)    History of breast cancer    Dementia with behavioral disturbance    Abdominal pain    BMI 28.0-28.9,adult    Hallucination, visual    Closed displaced fracture of middle phalanx of right middle finger    Perforated viscus    Bowel perforation (HCC)    Open abdominal wall wound    Cervical compression fracture, initial encounter ([facility])       DIAGNOSIS AND PLAN  C2/4 Fx, L1 Fx, Possible T1 Wedge Fx   UTI on admission  Chronic urinary retention  L Scalp Hematoma  Hyponatremia  Chronic abdominal wound    PLAN:  -Up with collar  -Fluid restriction  -Salt tabs  -Bmp x 3 days  -Rocephin completion 12/13  -Wound care abd   -VTE ppx  -Resume eliquis as OP per her primary cardiologist given frequent falls    Dispo: DNR [facility]. Planning for SNF. Chief Complaint: \"feeling well\"    SUBJECTIVE    Patient seen and evaluated. No acute events overnight. Afebrile, hemodynamically stable, satting greater than 95% on room air. 2380 cc urine charted yesterday. Tolerating diet. Sodium improved to 130 today. OBJECTIVE  VITALS:   Vitals:    12/13/22 0445   BP:    Pulse:    Resp: 16   Temp:    SpO2:      Physical Exam  Constitutional:       Appearance: Normal appearance. Eyes:      Conjunctiva/sclera: Conjunctivae normal.      Pupils: Pupils are equal, round, and reactive to light. Neck:      Comments: C collar in place  Cardiovascular:      Pulses: Normal pulses. Pulmonary:      Effort: Pulmonary effort is normal.   Chest:      Chest wall: No tenderness. Abdominal:      Palpations: Abdomen is soft. Comments: Abdominal wound with packing in place.    Musculoskeletal:         General: Tenderness: T11-L1 region. Normal range of motion. Cervical back: Tenderness: C3-C4. Skin:     General: Skin is warm and dry. Neurological:      General: No focal deficit present. Mental Status: She is alert. LAB:  CBC:   Recent Labs     12/10/22  1620 12/11/22  0323 12/12/22  0625   WBC 8.1 6.2 5.0   HGB 11.3* 10.0* 10.9*   HCT 38.0 32.8* 38.2   .7* 105.1* 110.7*    146 133*       BMP:   Attestation signed by Ashleigh Izaguirre MD    I personally evaluated the patient and directed the medical decision making with Resident/CONCHITA after the physical/radiologic exam and laboratory values were reviewed and confirmed.       Ashleigh Izaguirre MD

## 2022-12-13 NOTE — CONSULTS
[facility] Inpatient Psychotherapy Note  JOCELIN Harris   12/13/2022  10:08 AM  [place] Bensonini  7/30/1932  [id number]      Time spent with Patient: 16 minutes   9:38 AM to 9:54 AM      Presenting Patient Report:  Patient was assessed at the request of the Trauma Team.    Patient presents as a 80year old female subsequent to hospital admission from Emergency Department following mechanical fall resulting in injury. Patient reports distress at being held in hospital and appeared to forget that she had been injured but when reminded of her injury indicated distress at her amount of pain. Patient reports pain is not managed by medications. During session, patient declined food and stated that she refused to order food when staff came in to ask. Patient stated that she is \"broken hearted\" that her daughter is not getting her out of the hospital.  Patient describes her time in the hospital as \"3 days in shelter\" which indicates orientation to time but had difficulty remembering the date. Patient had difficulty answering most questions and was unable to stay on topic.       MSE:     Appearance    cooperative, moderate distress  Appetite abnormal: refusing to eat  Sleep disturbance unable to determine   Fatigue Yes  Loss of pleasure Yes  Impulsive behavior No  Speech    spontaneous, normal rate, normal volume, and interrupting  Mood    Depressed  Affect    depressed affect  Thought Content    possible delerium  Thought Process    loose associations  Associations    loose associations  Insight    Poor  Judgment    Impaired  Orientation    oriented to person, place, time/date, and month of year  Memory    global memory impairment noted  Attention/Concentration    impaired  Morbid ideation No  Suicide Assessment    no suicidal ideation    (See C-SSRS in flow sheets if suicidal ideations are present)  (PCL-5, PHQ-9, MERLYN-7 available in flow sheets)    Assessment:  Patient exhibits cognitive decline with symptoms of depression  Patient's score of 8 on Geriatric Depression Scale (short form) is indicative of clinical level of depression and follow up would be appropriate. Screening Scale Results:  Geriatric Depression Scale (short form) Score = 8  Score ? 5 is indicative of depression     Diagnoses: The following Diagnoses are based on currently available information and may change as additional information becomes available. F32. A Unspecified Depressive disorder   R41.9 Unspecified Neurocognitive disorder      Recommendations / Plan:  Recommend outpatient follow up for depression and dementia concerns. This writer will attempt to see patient for follow up if still admitted to hospital at later time. Pt interventions:  Conducted functional assessment: Geriatric Depression Scale (short form)         Were changes or additions made to the treatment plan today?   YES []   NO [x]  Noted changes:

## 2022-12-14 LAB
ANION GAP SERPL CALCULATED.3IONS-SCNC: 13 MMOL/L (ref 9–17)
BUN BLDV-MCNC: 8 MG/DL (ref 8–23)
CALCIUM SERPL-MCNC: 8.5 MG/DL (ref 8.6–10.4)
CHLORIDE BLD-SCNC: 105 MMOL/L (ref 98–107)
CO2: 15 MMOL/L (ref 20–31)
CREAT SERPL-MCNC: 0.73 MG/DL (ref 0.5–0.9)
GFR SERPL CREATININE-BSD FRML MDRD: >60 ML/MIN/1.73M2
GLUCOSE BLD-MCNC: 90 MG/DL (ref 70–99)
POTASSIUM SERPL-SCNC: 4.3 MMOL/L (ref 3.7–5.3)
SODIUM BLD-SCNC: 133 MMOL/L (ref 135–144)

## 2022-12-14 PROCEDURE — 2580000003 HC RX 258: Performed by: STUDENT IN AN ORGANIZED HEALTH CARE EDUCATION/TRAINING PROGRAM

## 2022-12-14 PROCEDURE — 1200000000 HC SEMI PRIVATE

## 2022-12-14 PROCEDURE — 6370000000 HC RX 637 (ALT 250 FOR IP): Performed by: NURSE PRACTITIONER

## 2022-12-14 PROCEDURE — 36415 COLL VENOUS BLD VENIPUNCTURE: CPT

## 2022-12-14 PROCEDURE — 6370000000 HC RX 637 (ALT 250 FOR IP): Performed by: STUDENT IN AN ORGANIZED HEALTH CARE EDUCATION/TRAINING PROGRAM

## 2022-12-14 PROCEDURE — 80048 BASIC METABOLIC PNL TOTAL CA: CPT

## 2022-12-14 PROCEDURE — 51798 US URINE CAPACITY MEASURE: CPT

## 2022-12-14 PROCEDURE — 6370000000 HC RX 637 (ALT 250 FOR IP)

## 2022-12-14 PROCEDURE — 6360000002 HC RX W HCPCS: Performed by: NURSE PRACTITIONER

## 2022-12-14 PROCEDURE — 97530 THERAPEUTIC ACTIVITIES: CPT

## 2022-12-14 PROCEDURE — 97535 SELF CARE MNGMENT TRAINING: CPT

## 2022-12-14 RX ADMIN — DOXAZOSIN 1 MG: 1 TABLET ORAL at 07:37

## 2022-12-14 RX ADMIN — TRAMADOL HYDROCHLORIDE 50 MG: 50 TABLET, COATED ORAL at 13:32

## 2022-12-14 RX ADMIN — PANTOPRAZOLE SODIUM 40 MG: 40 TABLET, DELAYED RELEASE ORAL at 05:46

## 2022-12-14 RX ADMIN — ATENOLOL 50 MG: 50 TABLET ORAL at 07:37

## 2022-12-14 RX ADMIN — OXYCODONE HYDROCHLORIDE AND ACETAMINOPHEN 500 MG: 500 TABLET ORAL at 07:37

## 2022-12-14 RX ADMIN — PANTOPRAZOLE SODIUM 40 MG: 40 TABLET, DELAYED RELEASE ORAL at 17:14

## 2022-12-14 RX ADMIN — TRAMADOL HYDROCHLORIDE 50 MG: 50 TABLET, COATED ORAL at 20:21

## 2022-12-14 RX ADMIN — POLYETHYLENE GLYCOL 3350 17 G: 17 POWDER, FOR SOLUTION ORAL at 07:38

## 2022-12-14 RX ADMIN — IBUPROFEN 400 MG: 800 TABLET, FILM COATED ORAL at 16:36

## 2022-12-14 RX ADMIN — Medication 1 TABLET: at 07:38

## 2022-12-14 RX ADMIN — ENOXAPARIN SODIUM 30 MG: 100 INJECTION SUBCUTANEOUS at 20:18

## 2022-12-14 RX ADMIN — TRAMADOL HYDROCHLORIDE 50 MG: 50 TABLET, COATED ORAL at 07:29

## 2022-12-14 RX ADMIN — ENOXAPARIN SODIUM 30 MG: 100 INJECTION SUBCUTANEOUS at 07:38

## 2022-12-14 RX ADMIN — SODIUM CHLORIDE TAB 1 GM 2 G: 1 TAB at 07:37

## 2022-12-14 RX ADMIN — SODIUM CHLORIDE, PRESERVATIVE FREE 5 ML: 5 INJECTION INTRAVENOUS at 20:21

## 2022-12-14 RX ADMIN — FERROUS SULFATE TAB EC 325 MG (65 MG FE EQUIVALENT) 325 MG: 325 (65 FE) TABLET DELAYED RESPONSE at 07:37

## 2022-12-14 RX ADMIN — MIRTAZAPINE 15 MG: 15 TABLET, FILM COATED ORAL at 21:07

## 2022-12-14 RX ADMIN — DONEPEZIL HYDROCHLORIDE 5 MG: 5 TABLET, FILM COATED ORAL at 20:18

## 2022-12-14 RX ADMIN — LEVOTHYROXINE SODIUM 100 MCG: 100 TABLET ORAL at 05:46

## 2022-12-14 ASSESSMENT — PAIN SCALES - GENERAL
PAINLEVEL_OUTOF10: 9
PAINLEVEL_OUTOF10: 8

## 2022-12-14 ASSESSMENT — PAIN DESCRIPTION - LOCATION
LOCATION: NECK

## 2022-12-14 ASSESSMENT — PAIN DESCRIPTION - ORIENTATION: ORIENTATION: RIGHT

## 2022-12-14 NOTE — CARE COORDINATION
Spoke with Luis Watts at [address] [place] [address], who relates they are still awaiting precert. They are able to accommodate straight cath, but requesting we send a few with her because they'll have to order some. Robson Mauricio, HARRISON updated    2706 received call from Luis Watts at [address] [place] [address]. They have precert. Transportation arranged for 1:30pm pickup on 12/15/2022 per Kady Dunham at [facility] Luis Watts at [address] [place] [address] updated. Patients daughter Kati updated at bedside, agreeable.   Robson Mauricio, RN updated, reminded that patient needs to be sent with straight cath supplies

## 2022-12-14 NOTE — PROGRESS NOTES
PROGRESS NOTE          PATIENT NAME: [address] RECORD NO. [id number]  DATE: 12/14/2022    HD: # 4      Patient Active Problem List   Diagnosis    Hypertension, benign essential, goal below 140/90    Back pain, chronic    Hearing loss    Obesity (BMI 30.0-34. 9)    Hypothyroidism    Chronic atrial fibrillation (HCC)    History of breast cancer    Dementia with behavioral disturbance    Abdominal pain    BMI 28.0-28.9,adult    Hallucination, visual    Closed displaced fracture of middle phalanx of right middle finger    Perforated viscus    Bowel perforation (HCC)    Open abdominal wall wound    Cervical compression fracture, initial encounter ([facility]       DIAGNOSIS AND PLAN  C2/4 Fx, L1 Fx, Possible T1 Wedge Fx  - Up with collar  - Neurosurgery signed off. No neurosurgical interventions at this time. Follow-up in 4 to 6 weeks with repeat upright cervical x-rays. She will need bone growth stimulator which is being arranged by neurosurgery  - CTA neck neg  - MRI cervical neg for ligamentous injury     UTI, POA  -S/p Rocephin completed on 12/13/2022  - St. Cath done 12/11/22 06:30, 500cc urine output  - WBC 6.2 on 12/11/22    Hyponatremia  -Fluid restrict, salt tabs    Chronic abdominal wound  L Scalp Hematoma  Hyperkalemia, Resolved        Chief Complaint: \"feeling well\"    SUBJECTIVE    Patient seen resting comfortably in bed. No acute events overnight. Medically stable for discharge to SNF. No changes in care. Awaiting placement    OBJECTIVE  VITALS:   Vitals:    12/13/22 2228   BP: (!) 148/98   Pulse: 95   Resp:    Temp:    SpO2:      Physical Exam  Constitutional:       Appearance: Normal appearance. HENT:      Head: Normocephalic and atraumatic. Nose: Nose normal.   Eyes:      Extraocular Movements: Extraocular movements intact. Pupils: Pupils are equal, round, and reactive to light. Cardiovascular:      Rate and Rhythm: Normal rate and regular rhythm. Pulses: Normal pulses. Heart sounds: Normal heart sounds. Pulmonary:      Effort: Pulmonary effort is normal.      Breath sounds: Normal breath sounds. Chest:      Chest wall: No tenderness. Abdominal:      General: Abdomen is flat. Bowel sounds are normal. There is no distension. Palpations: Abdomen is soft. Tenderness: There is no abdominal tenderness. Musculoskeletal:         General: Tenderness (T11-L1 region) present. Normal range of motion. Cervical back: Normal range of motion. Tenderness: C3-C4. Right lower leg: No edema. Left lower leg: No edema. Skin:     General: Skin is warm and dry. Neurological:      General: No focal deficit present. Mental Status: She is alert. LAB:  CBC:   Recent Labs     12/12/22  0625   WBC 5.0   HGB 10.9*   HCT 38.2   .7*   *     BMP:   Recent Labs     12/12/22  0625 12/13/22  0527 12/14/22  0528   * 130* 133*   K 4.7 4.4 4.3    104 105   CO2 14* 16* 15*   BUN 16 11 8   CREATININE 0.89 0.83 0.73   GLUCOSE 67* 88 90       RADIOLOGY:  CT Cspine w/o Contrast --   Acute nondisplaced fractures of C2 and C4, as detailed above. 2. Possible mild acute wedge compression fracture of the superior endplate of   T1.   CT Head w/o Contrast --  1. No acute intracranial abnormality. 2. Small left parietal scalp hematoma, without acute skull fracture or acute   intracranial hemorrhage. 3. Stable age-appropriate atrophy and chronic small vessel ischemic white   matter disease. 4. Paranasal sinusitis, as detailed above. Clif Newell DO  12/14/2022, 7:05 AM         Attestation signed by Saloni Pacheco MD    I personally evaluated the patient and directed the medical decision making with Resident/CONCHITA after the physical/radiologic exam and laboratory values were reviewed and confirmed.       Saloni Pacheco MD

## 2022-12-14 NOTE — PROGRESS NOTES
Physical Therapy  Facility/Department: [facility] RENAL//MED SURG  Daily Treatment Note  NAME: Leandro Santo  : 1932  MRN: [id number]    Date of Service: 2022    Discharge Recommendations:  Patient would benefit from continued therapy after discharge   PT Equipment Recommendations  Equipment Needed: No    Patient Diagnosis(es): The primary encounter diagnosis was Fall, subsequent encounter. Diagnoses of Other closed nondisplaced fracture of second cervical vertebra, sequela, Other closed nondisplaced fracture of fourth cervical vertebra, initial encounter ([facility] and Cervical compression fracture, initial encounter [facility]) were also pertinent to this visit. Assessment   Pt agreeable to dangle with collar on, but once upright, fighting to lie back down; pushing her buttocks forward to the EOB; able to stand with max A+1 but pushing posteriorly and trying to sit back down; granddaughters present and encouraging pt to continue standing with PT. Pt refused to continue wearing her c-collar once supine. Discussion with pt and granddaughters re: importance of c-collar to stabilize c-spine d/t fractures; they indicated understanding but pt still declined to wear c-collar after discussion; I also discussed with pt and granddaughters the importance of pt not moving her head/neck when collar off; all indicated understanding (but pt has history of dementia). Once supine pt stated \"I need my walker so I can walk\"; reeducated pt on the fact that she needs to be able to tolerate sitting and standing a lot better before we can work on ambulating. Activity Tolerance: Patient limited by pain  Equipment Needed: No     Plan    Physcial Therapy Plan  General Plan:  (5-6 visits weekly)  Current Treatment Recommendations: Strengthening;Balance training;Functional mobility training;Transfer training; Endurance training;Home exercise program;Safety education & training;Patient/Caregiver education & training;Equipment evaluation, education, & procurement; Therapeutic activities; Positioning;ROM;Gait training;Neuromuscular re-education  PT Plan of Care: Daily     Restrictions  Restrictions/Precautions  Restrictions/Precautions: Fall Risk, General Precautions  Required Braces or Orthoses?: Yes  Required Braces or Orthoses  Cervical: c-collar (at all times)  Position Activity Restriction  Other position/activity restrictions: up with assist. acute C2, C4 fracture. C-collar at all times, DNR-CCA     Subjective    Subjective  Pain: 8/10 posterior neck pain at rest; \"more than\" 10/10 with mobility  Orientation  Overall Orientation Status: Within Functional Limits (she knew she was in [facility], thought she was at [facility])  Cognition  Overall Cognitive Status: Exceptions  Following Commands: Follows multistep commands with increased time; Follows multistep commands with repitition  Attention Span: Attends with cues to redirect  Safety Judgement: Decreased awareness of need for assistance;Decreased awareness of need for safety (pt unwilling to keep c-collar on after done mobilizing; educated pt and granddaughters re: importance of c-collar use)  Insights: Decreased awareness of deficits  Initiation: Requires cues for some  Sequencing: Requires cues for some     Objective   Vitals  O2 Device: None (Room air)  Bed Mobility Training  Bed Mobility Training: Yes  Overall Level of Assistance: Maximum assistance;Assist X2  Interventions: Verbal cues; Safety awareness training  Rolling: Maximum assistance;Assist X2  Supine to Sit: Maximum assistance;Assist X2  Sit to Supine: Maximum assistance;Assist X2  Scooting:  Total assistance;Assist X2  Duration:  (pt able to dangle EOB ~1 minute) but moving hips foreward, in danger of sliding forward off the bed--max A to prevent  Balance  Sitting: With support  Standing: With support  Transfer Training  Transfer Training: Yes  Overall Level of Assistance: Maximum assistance;Assist X1 (pt actually stood with mod A+1, but max A to maintain standing ~30 seconds; limited by severe posterior neck pain)  Interventions: Verbal cues; Visual cues;Demonstration;Manual cues; Safety awareness training  Sit to Stand: Assist X1  Stand to Sit: Maximum assistance  Gait Training  Gait Training: No  Wheelchair Management  Wheelchair Management: No     PT Exercises  A/AROM Exercises: AROM BLEs pt supine: heel slides, hip ab/adduction, SAQs, ankle pumps x 10 reps     Safety Devices  Type of Devices: Bed alarm in place;Call light within reach;Gait belt;Patient at risk for falls; Left in bed;Nurse notified  Restraints  Restraints Initially in Place: No       Goals  Short Term Goals  Time Frame for Short Term Goals: 14 visits  Short Term Goal 1: Pt will perform supine<>sit transfer with min-A. Short Term Goal 2: Pt will perform rolling R/L with SBA. Short Term Goal 3: Pt will perform a functional transfer with min-A. Short Term Goal 4: Pt will perform 300 feet of manual wheelchair propulsion with CGA. Short Term Goal 5: Pt will demonstrate fair sitting balance to decrease fall risk. Additional Goals?: No    Education  Patient Education  Education Given To: Patient; Family  Education Provided: Role of Therapy;Transfer Training;Plan of Care;Precautions; Family Education;Equipment; Fall Prevention Strategies  Education Provided Comments: educated pt and granddaughters re: reason for c-collar use and importance of pt wearing it; pt stated understanding and wore it for mobility, but insisted taking it off upon returning to supine; educated pt and granddaughters of importance of pt not moving her head/neck with collar off  Education Method: Verbal  Barriers to Learning: Cognition  Education Outcome: Continued education needed    Therapy Time   Individual Concurrent Group Co-treatment   Time In 1413         Time Out 1440         Minutes 27         Timed Code Treatment Minutes: [address], PT

## 2022-12-14 NOTE — PROGRESS NOTES
Occupational Therapy  Facility/Department: [facility] RENAL//MED SURG  Occupational Therapy Daily Treatment Note      Name: Nick Jin  : 1932  MRN: [id number]  Date of Service: 2022    Discharge Recommendations:  Patient would benefit from continued therapy after discharge        Patient Diagnosis(es): The primary encounter diagnosis was Fall, subsequent encounter. Diagnoses of Other closed nondisplaced fracture of second cervical vertebra, sequela, Other closed nondisplaced fracture of fourth cervical vertebra, initial encounter ([facility] and Cervical compression fracture, initial encounter [facility]) were also pertinent to this visit. Past Medical History:  has a past medical history of Atrial fibrillation, Back pain, chronic, Dementia ([facility] GERD (gastroesophageal reflux disease), Hiatal hernia, History of breast cancer, History of glaucoma, Hypertension, benign essential, goal below 140/90, and Hypothyroid. Past Surgical History:  has a past surgical history that includes Cholecystectomy; Breast surgery; Cataract removal with implant; bladder repair; and laparotomy (N/A, 10/17/2022). Treatment Diagnosis: Fall      Assessment   Performance deficits / Impairments: Decreased functional mobility ; Decreased endurance;Decreased ADL status; Decreased balance;Decreased high-level IADLs;Decreased cognition;Decreased safe awareness;Decreased strength;Decreased ROM; Decreased coordination;Decreased fine motor control  Assessment: Pt would benefit from continued acute care and post acute care OT to address above listed deficits. Treatment Diagnosis: Fall  Prognosis: Fair  Activity Tolerance  Activity Tolerance: Patient limited by pain; Patient limited by fatigue;Treatment limited secondary to decreased cognition        Plan   Occupational Therapy Plan  Times Per Week: 3-4x     Restrictions  Restrictions/Precautions  Restrictions/Precautions: Fall Risk, General Precautions, Up as Tolerated  Required Braces or Orthoses?: Yes  Required Braces or Orthoses  Cervical: c-collar  Position Activity Restriction  Other position/activity restrictions: up with assist. acute C2, C4 fracture. C-collar at all times, DNR-CCA    Subjective   General  Patient assessed for rehabilitation services?: Yes  Family / Caregiver Present: No  Diagnosis: C2/4 fx's, Fall, L scalp hematoma, L1 comp fx-no NS intervention per chart  Subjective  Subjective: 10/10 pain when sitting upright per pt  General Comment  Comments: RN approved for OT tx this pm, pt cooperative at times  8/10 posterior neck pain at rest; \"more than\" 10/10 with movement    Objective   O2 Device: None (Room air)  Safety Devices  Type of Devices: Bed alarm in place;Call light within reach;Gait belt;Patient at risk for falls; Left in bed;Nurse notified  Restraints  Restraints Initially in Place: No  Bed Mobility Training: Yes  Overall Level of Assistance: Maximum assistance;Assist X2;Additional time  Interventions: Verbal cues; Safety awareness training; Tactile cues  Rolling: Maximum assistance;Assist X2;Additional time  Supine to Sit: Maximum assistance;Assist X2;Additional time  Sit to Supine: Maximum assistance;Assist X2;Additional time  Scooting: Total assistance;Assist X2;Additional time  Duration:  (pt able to dangle EOB ~1-2 minute)  Balance  Sitting: With support (seated EOB w/max assist ~1-2 min w/max assist)  Standing: With support (stood at EOB ~15-20 sec to straighten linen in bed w/max x1 face to face transfer)  Transfer Training  Transfer Training: Yes  Overall Level of Assistance: Maximum assistance;Assist X1  Interventions: Verbal cues; Visual cues;Demonstration;Manual cues; Safety awareness training  Sit to Stand: Assist X1;Maximum assistance  Stand to Sit: Maximum assistance;Assist X1  Gait Training: No     ADL  Feeding: Setup;Verbal cueing; Increased time to complete;Minimal assistance (assist to open containers, cut food, able to take cup to mouth)  Grooming: Setup;Verbal cueing; Increased time to complete;Stand by assistance (wash face, brush hair, apply lip balm)  UE Bathing: Setup;Verbal cueing; Increased time to complete;Stand by assistance (assist to wash back)  LE Bathing: Increased time to complete;Verbal cueing;Setup; Moderate assistance (able to wash tops of legs, assist to wash lower legs and feet)  UE Dressing: Stand by assistance;Setup;Verbal cueing; Increased time to complete (assist to snap, thread arms and tie gown)  LE Dressing: Maximum assistance;Setup;Verbal cueing; Increased time to complete (assist to don footies)  Toileting: Unable to assess(comment)  Additional Comments: Pt limited by advanced age/neck pain and weakness, Pt often stubborn and requiring encouragement from therapist, sat EOB ~1-2 min total this date    Pt in bed upon arrival. Pt HOB slightly elevated and pt was setup to complete grooming and UB bathing/dressing (see above for LOF). C-collar donned and transferred to EOB w/max assist x2. Pt required max assist for sitting balance d/t wanting to lay back down d/t pain and pt was pushing posteriorly while sitting EOB. Pt sat ~1-2 min and then stood for ~20 sec at EOB w/face to face transfer w/PT. Linens straightened and pt retired back to supine in bed. Pt refuses to wear c-collar at all times and only agreed to wear sitting EOB. PT w/pt at end of tx. RN notified. Activity Tolerance  Activity Tolerance: Patient limited by pain  Cognition  Overall Cognitive Status: Exceptions  Following Commands: Follows multistep commands with increased time; Follows multistep commands with repitition  Attention Span: Attends with cues to redirect  Safety Judgement: Decreased awareness of need for assistance;Decreased awareness of need for safety  Problem Solving: Assistance required to identify errors made;Assistance required to generate solutions  Insights: Decreased awareness of deficits  Initiation: Requires cues for some  Sequencing: Requires cues for some  Cognition Comment: Pt overestimating abilities consistently  Orientation  Overall Orientation Status: Within Functional Limits  Orientation Level: Oriented to place;Oriented to situation;Oriented to person;Disoriented to time  Education Given To: Patient  Education Provided: Role of Therapy;Plan of Care;ADL Adaptive Strategies;Transfer Training  Education Provided Comments: Importance of OT/activity  Education Method: Verbal;Demonstration  Barriers to Learning: Cognition;Vision; Hearing  Education Outcome: Continued education needed    AM-PAC Score  AM-PAC Inpatient Daily Activity Raw Score: 15 (12/14/22 1607)  AM-PAC Inpatient ADL T-Scale Score : 34.69 (12/14/22 1607)  ADL Inpatient CMS 0-100% Score: 56.46 (12/14/22 1607)  ADL Inpatient CMS G-Code Modifier : CK (12/14/22 1607)     Goals  Short Term Goals  Time Frame for Short Term Goals: Pt will by discharge  Short Term Goal 1: demo ADL UB bathing/dressing activity at min A, setup, increased time, including c-collar  Short Term Goal 2: demo ADL LB bathing/dressing activity at mod A, AE PRN, and increased time  Short Term Goal 3: demo dynamic unsupported sitting for 5 min+ during func activity at min A  Short Term Goal 4: demo [address] func activity for 7 min+ with [place] assist-min A, with <3 errors     Therapy Time   Individual Concurrent Group Co-treatment   Time In 1350         Time Out 1420         Minutes 30      7 min   Timed Code Treatment Minutes: 23 Minutes       [address], MCKNIGHT/L

## 2022-12-15 VITALS
HEIGHT: 59 IN | DIASTOLIC BLOOD PRESSURE: 79 MMHG | SYSTOLIC BLOOD PRESSURE: 157 MMHG | BODY MASS INDEX: 20.59 KG/M2 | HEART RATE: 62 BPM | OXYGEN SATURATION: 92 % | TEMPERATURE: 97.5 F | RESPIRATION RATE: 18 BRPM | WEIGHT: 102.12 LBS

## 2022-12-15 LAB
ANION GAP SERPL CALCULATED.3IONS-SCNC: 14 MMOL/L (ref 9–17)
BUN BLDV-MCNC: 15 MG/DL (ref 8–23)
CALCIUM SERPL-MCNC: 9.1 MG/DL (ref 8.6–10.4)
CHLORIDE BLD-SCNC: 104 MMOL/L (ref 98–107)
CO2: 17 MMOL/L (ref 20–31)
CREAT SERPL-MCNC: 1.08 MG/DL (ref 0.5–0.9)
GFR SERPL CREATININE-BSD FRML MDRD: 49 ML/MIN/1.73M2
GLUCOSE BLD-MCNC: 90 MG/DL (ref 70–99)
HCT VFR BLD CALC: 37.2 % (ref 36.3–47.1)
HEMOGLOBIN: 11.5 G/DL (ref 11.9–15.1)
MAGNESIUM: 1.9 MG/DL (ref 1.6–2.6)
MCH RBC QN AUTO: 30.8 PG (ref 25.2–33.5)
MCHC RBC AUTO-ENTMCNC: 30.9 G/DL (ref 28.4–34.8)
MCV RBC AUTO: 99.7 FL (ref 82.6–102.9)
NRBC AUTOMATED: 0 PER 100 WBC
PDW BLD-RTO: 13.4 % (ref 11.8–14.4)
PLATELET # BLD: 209 K/UL (ref 138–453)
PMV BLD AUTO: 9.3 FL (ref 8.1–13.5)
POTASSIUM SERPL-SCNC: 4.3 MMOL/L (ref 3.7–5.3)
RBC # BLD: 3.73 M/UL (ref 3.95–5.11)
SODIUM BLD-SCNC: 135 MMOL/L (ref 135–144)
WBC # BLD: 5.7 K/UL (ref 3.5–11.3)

## 2022-12-15 PROCEDURE — 6370000000 HC RX 637 (ALT 250 FOR IP)

## 2022-12-15 PROCEDURE — 2580000003 HC RX 258: Performed by: STUDENT IN AN ORGANIZED HEALTH CARE EDUCATION/TRAINING PROGRAM

## 2022-12-15 PROCEDURE — 85027 COMPLETE CBC AUTOMATED: CPT

## 2022-12-15 PROCEDURE — 6360000002 HC RX W HCPCS: Performed by: NURSE PRACTITIONER

## 2022-12-15 PROCEDURE — 80048 BASIC METABOLIC PNL TOTAL CA: CPT

## 2022-12-15 PROCEDURE — 51701 INSERT BLADDER CATHETER: CPT

## 2022-12-15 PROCEDURE — 36415 COLL VENOUS BLD VENIPUNCTURE: CPT

## 2022-12-15 PROCEDURE — 83735 ASSAY OF MAGNESIUM: CPT

## 2022-12-15 PROCEDURE — 6370000000 HC RX 637 (ALT 250 FOR IP): Performed by: STUDENT IN AN ORGANIZED HEALTH CARE EDUCATION/TRAINING PROGRAM

## 2022-12-15 RX ORDER — ENOXAPARIN SODIUM 100 MG/ML
30 INJECTION SUBCUTANEOUS DAILY
Status: DISCONTINUED | OUTPATIENT
Start: 2022-12-16 | End: 2022-12-15 | Stop reason: HOSPADM

## 2022-12-15 RX ADMIN — Medication 1 TABLET: at 09:10

## 2022-12-15 RX ADMIN — POLYETHYLENE GLYCOL 3350 17 G: 17 POWDER, FOR SOLUTION ORAL at 09:11

## 2022-12-15 RX ADMIN — OXYCODONE HYDROCHLORIDE AND ACETAMINOPHEN 500 MG: 500 TABLET ORAL at 09:10

## 2022-12-15 RX ADMIN — SODIUM CHLORIDE, PRESERVATIVE FREE 10 ML: 5 INJECTION INTRAVENOUS at 09:11

## 2022-12-15 RX ADMIN — LEVOTHYROXINE SODIUM 100 MCG: 100 TABLET ORAL at 06:55

## 2022-12-15 RX ADMIN — IBUPROFEN 400 MG: 800 TABLET, FILM COATED ORAL at 00:25

## 2022-12-15 RX ADMIN — PANTOPRAZOLE SODIUM 40 MG: 40 TABLET, DELAYED RELEASE ORAL at 06:55

## 2022-12-15 RX ADMIN — ATENOLOL 50 MG: 50 TABLET ORAL at 09:10

## 2022-12-15 RX ADMIN — ENOXAPARIN SODIUM 30 MG: 100 INJECTION SUBCUTANEOUS at 09:11

## 2022-12-15 RX ADMIN — TRAMADOL HYDROCHLORIDE 50 MG: 50 TABLET, COATED ORAL at 06:49

## 2022-12-15 RX ADMIN — FERROUS SULFATE TAB EC 325 MG (65 MG FE EQUIVALENT) 325 MG: 325 (65 FE) TABLET DELAYED RESPONSE at 09:10

## 2022-12-15 RX ADMIN — TRAMADOL HYDROCHLORIDE 50 MG: 50 TABLET, COATED ORAL at 13:00

## 2022-12-15 RX ADMIN — DOXAZOSIN 1 MG: 1 TABLET ORAL at 09:10

## 2022-12-15 ASSESSMENT — PAIN SCALES - GENERAL
PAINLEVEL_OUTOF10: 7
PAINLEVEL_OUTOF10: 3

## 2022-12-15 ASSESSMENT — PAIN DESCRIPTION - LOCATION
LOCATION: NECK

## 2022-12-15 ASSESSMENT — PAIN DESCRIPTION - DESCRIPTORS
DESCRIPTORS: ACHING
DESCRIPTORS: ACHING

## 2022-12-15 NOTE — PROGRESS NOTES
Patient was screaming and terrified. Patient said she cannot breath well because of neck brace. Writer checked Patient's saturation is 93~94% in room air. Writer removed her neck brace and put O2 3L/min via N/C for her comfort. Writer messaged about event to Dr. Amena Ochoa. After getting oxygen,writer checked patient is sleeping. Writer left secured message to neurosurgery that she cannot keep her neck brace for night. Writer will put her neck brace in the morning.

## 2022-12-15 NOTE — PROGRESS NOTES
Comprehensive Nutrition Assessment    Type and Reason for Visit:  Reassess    Nutrition Recommendations/Plan:   Start Ensure TID to aid in meeting nutrition needs for weight maintenance. Continue to monitor intakes, weights, labs, and follow up. Malnutrition Assessment:  Malnutrition Status: Moderate malnutrition (12/12/22 1505)    Context:  Acute Illness     Findings of the 6 clinical characteristics of malnutrition:  Energy Intake:  Mild decrease in energy intake (Comment)  Weight Loss:  Greater than 7.5% over 3 months     Body Fat Loss: Moderate body fat loss Orbital   Muscle Mass Loss:  Mild muscle mass loss Temples (temporalis), Clavicles (pectoralis & deltoids)  Fluid Accumulation:  No significant fluid accumulation     Strength:  Not Performed    Nutrition Assessment:    Patient seen today for follow up regarding weight loss/poor appetite. Observed breakfast tray on bedside table, 0% consumed. Documented intakes 25-50%. Granddaughter in room at time of visit. Patient alert to self only. Granddaughter reported that patient consumes Ensure TID daily (chocolate/vanilla). Per granddaughter, patient usual body weight is 201 lbs as of 12/12. Unsure of accuracy related to weight upon admission 12/10 was 110 lbs, current weight 12/15 102 lbs, showing undesired weight loss. Patient remains on mechanically altered diet textures. Edentulous. LBM 12/15. Will recommend to start Ensure TID to aid in meeting nutrition needs.     Nutrition Related Findings:    Meds: vit C, Iron, senokot, synthroid, remeron, glycolax, MVI with min; Labs: 12/15 Cr 1.08 mg/dL, GFR 49 mL/min, CO2 17 mmol/L Wound Type: Surgical Incision (nonhealing surgical wound to abdomen)       Current Nutrition Intake & Therapies:    Average Meal Intake: 26-50%  Average Supplements Intake: 26-50%  ADULT DIET; Easy to Chew  ADULT ORAL NUTRITION SUPPLEMENT; Breakfast, Lunch, Dinner; Frozen Oral Supplement    Anthropometric Measures:  Height: 4' 11\" (149.9 cm)  Ideal Body Weight (IBW): 95 lbs (43 kg)    Admission Body Weight: 110 lb (49.9 kg)  Current Body Weight: 102 lb 1.9 oz (46.3 kg), 115.8 % IBW. Weight Source: Bed Scale  Current BMI (kg/m2): 20.6  Weight Adjustment For: No Adjustment  BMI Categories: Underweight (BMI less than 22) age over 72    Estimated Daily Nutrient Needs:  Energy Requirements Based On: Kcal/kg  Weight Used for Energy Requirements: Current  Energy (kcal/day): 1400-1620kcal/day  Weight Used for Protein Requirements: Current  Protein (g/day): 55-70gmsPRO/day  Method Used for Fluid Requirements: 1 ml/kcal  Fluid (ml/day): per MD    Nutrition Diagnosis:   Inadequate oral intake related to cognitive or neurological impairment as evidenced by poor intake prior to admission, intake 26-50%, weight loss    Nutrition Interventions:   Food and/or Nutrient Delivery: Continue Current Diet, Start Oral Nutrition Supplement, Continue Oral Nutrition Supplement  Nutrition Education/Counseling: No recommendation at this time  Coordination of Nutrition Care: Continue to monitor while inpatient  Plan of Care discussed with: Granddaughter    Goals:  Previous Goal Met: No Progress toward Goal(s)  Goals: Meet at least 75% of estimated needs, within 7 days, PO intake 50% or greater       Nutrition Monitoring and Evaluation:   Behavioral-Environmental Outcomes: None Identified  Food/Nutrient Intake Outcomes: Food and Nutrient Intake, Supplement Intake  Physical Signs/Symptoms Outcomes: Biochemical Data, Skin, Weight, Chewing or Swallowing, GI Status, Fluid Status or Edema    Discharge Planning:     Too soon to determine     Gilford Raspberry, RD  Contact: [phone]

## 2022-12-15 NOTE — DISCHARGE SUMMARY
DISCHARGE SUMMARY:    PATIENT NAME:  Elmira Collet  YOB: 1932  MEDICAL RECORD NO. [id number]  DATE: 12/15/22  PRIMARY CARE PHYSICIAN: Theresa Randall MD  ADMIT DATE:  12/10/2022    DISCHARGE DATE:    DISPOSITION:  SNF  ADMITTING DIAGNOSIS:   C2/4 Fx, L1 Fx, Possible T1 Wedge Fx    DIAGNOSIS:   Patient Active Problem List   Diagnosis    Hypertension, benign essential, goal below 140/90    Back pain, chronic    Hearing loss    Obesity (BMI 30.0-34. 9)    Hypothyroidism    Chronic atrial fibrillation (HCC)    History of breast cancer    Dementia with behavioral disturbance    Abdominal pain    BMI 28.0-28.9,adult    Hallucination, visual    Closed displaced fracture of middle phalanx of right middle finger    Perforated viscus    Bowel perforation (HCC)    Open abdominal wall wound    C[facility]ical compression fracture, [facility]       CONSULTANTS:  neurosurgery, PT/OT/SLP    PROCEDURES:   N/a    HOSPITAL COURSE:   Elmira Collet is a 80 y.o. female who was admitted on 12/10/2022  Hospital Course:  60-year-old female with PMH including dementia who originally presented to the hospital on 12/10/2022 after a fall with cervical fractures. Patient had a UTI which was present on admission and treated with Rocephin. Neurosurgery consulted at time of arrival.  No neurosurgical interventions at this time. C-collar in place and up with collar okayed by neurosurgery. They would like to see the patient outpatient in 1 month with repeat CT head and cervical x-rays. She will need a bone growth stimulator which is being arranged by neurosurgery. CTA neck negative. MRI cervical spine negative for ligamentous injury. Additionally she had a chronic abdominal wound and left scalp hematoma which were present on admission and managed during hospitalization. She had mild electrolyte abnormalities including hyperkalemia which resolved and hyponatremia which resolved with fluid restriction and salt tabs. Patient medically stable for discharge to SNF at this time    Labs and imaging were followed daily. On day of discharge Marya Wong  was tolerating a regular diet  had adequate analgeia on oral medications  had no signs of complication. She was deemed medically stable for discharged to SNF        PHYSICAL EXAMINATION:        Discharge Vitals:  height is 4' 11\" (1.499 m) and weight is 102 lb 1.9 oz (46.3 kg). Her oral temperature is 97.5 °F (36.4 °C). Her blood pressure is 157/79 (abnormal) and her pulse is 62. Her respiration is 18 and oxygen saturation is 92%. Exam on day of discharge:      LABS:     Recent Labs     12/13/22 0527 12/14/22  0528 12/15/22  0746   WBC  --   --  5.7   HGB  --   --  11.5*   HCT  --   --  37.2   PLT  --   --  209   * 133* 135   K 4.4 4.3 4.3    105 104   CO2 16* 15* 17*   BUN 11 8 15   CREATININE 0.83 0.73 1.08*       DIAGNOSTIC TESTS:    No results found. DISCHARGE INSTRUCTIONS     Discharge Medications:        Medication List        START taking these medications      ciprofloxacin 500 MG tablet  Commonly known as: CIPRO  Take 0.5 tablets by mouth 2 times daily for 10 days            CHANGE how you take these medications      traMADol 50 MG tablet  Commonly known as: Ultram  Take 1 tablet by mouth every 4 hours as needed for Pain for up to 3 days. Intended supply: 3 days. Take lowest dose possible to manage pain  What changed: Another medication with the same name was removed. Continue taking this medication, and follow the directions you see here.             CONTINUE taking these medications      atenolol 50 MG tablet  Commonly known as: TENORMIN  TAKE 1 TABLET BY MOUTH EVERY DAY     donepezil 5 MG tablet  Commonly known as: ARICEPT  TAKE 1 TABLET BY MOUTH NIGHTLY     doxazosin 1 MG tablet  Commonly known as: CARDURA  TAKE 1 TABLET BY MOUTH EVERY DAY     ferrous sulfate 325 (65 Fe) MG tablet  Commonly known as: FeroSul  TAKE 1 TABLET BY MOUTH DAILY WITH BREAKFAST     Handicap Placard Misc  by Does not apply route 5 years, cannot walk over 200 ft.     levothyroxine 100 MCG tablet  Commonly known as: SYNTHROID  Take 1 tablet by mouth Daily     mirtazapine 15 MG tablet  Commonly known as: REMERON  TAKE 1 TABLET BY MOUTH NIGHTLY     omeprazole 20 MG delayed release capsule  Commonly known as: PRILOSEC  TAKE 1 CAPSULE BY MOUTH ONCE DAILY BEFORE A MEAL     potassium chloride 10 MEQ extended release capsule  Commonly known as: MICRO-K  TAKE 1 CAPSULE BY MOUTH TWICE DAILY WITH FOOD     PreserVision AREDS 2 Caps  Take 1 capsule by mouth daily     vitamin C 500 MG tablet  Commonly known as: ASCORBIC ACID  TAKE 1 TABLET BY MOUTH EVERY DAY            STOP taking these medications      sulfamethoxazole-trimethoprim 800-160 MG per tablet  Commonly known as: BACTRIM DS;SEPTRA DS               Where to Get Your Medications        These medications were sent to [address] [place] [address] [address], [address] [place] [address] Drive  [address] [place] [address], [address] [place] [address]      Phone: [phone]   ciprofloxacin 500 MG tablet       Diet: ADULT DIET; Easy to Chew  ADULT ORAL NUTRITION SUPPLEMENT; Breakfast, Lunch, Dinner; Frozen Oral Supplement diet as tolerated  Activity: As instructed WEIGHT BEARING STATUS: Weight bearing as tolerated. Up with c-collar  Wound Care: Daily and as needed. DISPOSITION: Skilled Facility    Follow-up:  Adrienne Nj, APRN - CNP  [facility], [address]  [phone]    Follow up  Follow up in 4-6 wees with cervical upright XR's. She will need a bone growth stimulator    Lvaelle Serrano MD  [place] [address]  [phone]    Schedule an appointment as soon as possible for a visit in 2 week(s)  Post Hospitilaztion follow up.     [address] [facility] [address] [phone]            SIGNED:  Ike Harden DO   12/15/2022, 10:40 AM  Time Spent for discharge: 35 minutes

## 2022-12-15 NOTE — PROGRESS NOTES
Pharmacy Note     Renal Dose Adjustment    Neto Gupta is a 80 y.o. female. Pharmacist assessment of renally cleared medications. Recent Labs     12/14/22  0528 12/15/22  0746   BUN 8 15       Recent Labs     12/14/22  0528 12/15/22  0746   CREATININE 0.73 1.08*       Estimated Creatinine Clearance: 25 mL/min (A) (based on SCr of 1.08 mg/dL (H)). Estimated CrCl using Ideal Body Weight: 25 mL/min (based on IBW  kg)    Height:   Ht Readings from Last 1 Encounters:   12/12/22 4' 11\" (1.499 m)     Weight:  Wt Readings from Last 1 Encounters:   12/15/22 102 lb 1.9 oz (46.3 kg)       The following medication dose has been adjusted based upon renal function per P&T Guidelines:             LOVENOX DOSE ADJUSTED DOWN TO 30 MG DAILY FOR CrCl < 30 ml/min.     Thank you  Sammy Velazquez, PharmD, [facility]  Inpatient Clinical Pharmacist  [phone]

## 2022-12-15 NOTE — PROGRESS NOTES
PROGRESS NOTE          PATIENT NAME: Terrence [address] RECORD NO. [id number]  DATE: 12/15/2022    HD: # 5      Patient Active Problem List   Diagnosis    Hypertension, benign essential, goal below 140/90    Back pain, chronic    Hearing loss    Obesity (BMI 30.0-34. 9)    Hypothyroidism    Chronic atrial fibrillation (HCC)    History of breast cancer    Dementia with behavioral disturbance    Abdominal pain    BMI 28.0-28.9,adult    Hallucination, visual    Closed displaced fracture of middle phalanx of right middle finger    Perforated viscus    Bowel perforation (HCC)    Open abdominal wall wound    Cervical compression fracture, initial encounter ([facility] [address]       DIAGNOSIS AND PLAN  C2/4 Fx, L1 Fx, Possible T1 Wedge Fx  - Up with collar. Okay'ed by NS  - Neurosurgery signed off. No neurosurgical interventions at this time. Follow-up in 4 to 6 weeks with repeat upright cervical x-rays. She will need bone growth stimulator which is being arranged by neurosurgery  - CTA neck neg  - MRI cervical neg for ligamentous injury  - medically stable for discharge to SNF     UTI, POA  -S/p Rocephin completed on 12/13/2022  -afebrile without leukocytosis    Chronic abdominal wound  L Scalp Hematoma  Hyperkalemia, Resolved  Hyponatremia, resolved with fluid restrict and salt tabs        Chief Complaint: \"feeling good\"    SUBJECTIVE    Patient seen resting comfortably in c-collar. No acute events overnight. No complaints. Discussed with family at bedside. She has transportation to her SNF today. Medically stable for discharge. OBJECTIVE  VITALS:   Vitals:    12/15/22 0649   BP: (!) 130/94   Pulse:    Resp:    Temp:    SpO2:      Physical Exam  Constitutional:       Appearance: Normal appearance. HENT:      Head: Normocephalic and atraumatic. Nose: Nose normal.   Eyes:      Extraocular Movements: Extraocular movements intact. Pupils: Pupils are equal, round, and reactive to light.    Cardiovascular:      Rate and Rhythm: Normal rate and regular rhythm. Pulses: Normal pulses. Heart sounds: Normal heart sounds. Pulmonary:      Effort: Pulmonary effort is normal.      Breath sounds: Normal breath sounds. Chest:      Chest wall: No tenderness. Abdominal:      General: Abdomen is flat. Bowel sounds are normal. There is no distension. Palpations: Abdomen is soft. Tenderness: There is no abdominal tenderness. Musculoskeletal:         General: Tenderness (T11-L1 region) present. Normal range of motion. Cervical back: Normal range of motion. Tenderness: C3-C4. Right lower leg: No edema. Left lower leg: No edema. Skin:     General: Skin is warm and dry. Neurological:      General: No focal deficit present. Mental Status: She is alert. LAB:  CBC:   No results for input(s): WBC, HGB, HCT, MCV, PLT in the last 72 hours. BMP:   Recent Labs     12/13/22  0527 12/14/22  0528   * 133*   K 4.4 4.3    105   CO2 16* 15*   BUN 11 8   CREATININE 0.83 0.73   GLUCOSE 88 90         RADIOLOGY:  XR CERVICAL SPINE (2-3 VIEWS)    Result Date: 12/13/2022  Unchanged alignment. Diffuse idiopathic skeletal hyperostosis with acute C4 fracture through a fused segment. Acute C2 fracture. XR SHOULDER RIGHT (MIN 2 VIEWS)    Result Date: 12/10/2022  No acute abnormality. AC joint degenerative changes     CT Head W/O Contrast    Result Date: 12/12/2022  1. No acute intracranial abnormality. 2. Small left parietal scalp hematoma, without acute skull fracture or acute intracranial hemorrhage. 3. Stable age-appropriate atrophy and chronic small vessel ischemic white matter disease. 4. Paranasal sinusitis, as detailed above. CT CSpine W/O Contrast    Result Date: 12/10/2022  1. Acute nondisplaced fractures of C2 and C4, as detailed above. 2. Possible mild acute wedge compression fracture of the superior endplate of T1.  Findings were discussed with Soni Krishnan at 1:57 pm on 12/10/2022. MRI CERVICAL SPINE WO CONTRAST    Result Date: 12/10/2022  Images are suboptimal due to patient motion At the expected location of the fracture of right lateral mass of C2 there is marrow edema. There is an acute superior endplate compression fracture of C4 and and T1 with 5-10% height loss. No associated ligamentous injury is noted. Axial images are significantly degraded by patient motion and neural foraminal narrowing cannot be reliably evaluated. There is moderate canal stenosis at the level of C5-C6 and C6-C7. No significant posterior disc pathology. CTA HEAD NECK W CONTRAST    Result Date: 12/10/2022  Motion limited exam.  No evidence of arterial injury in the head and neck. RECOMMENDATIONS: Unavailable     CT CHEST ABDOMEN PELVIS WO CONTRAST Additional Contrast? None    Result Date: 12/10/2022  Thoracic CT: No acute osseous abnormality of thoracic spine. No fracture. Lumbar CT: Unchanged chronic fracture deformity of superior endplate of L3 and L4 and L5 with with 20-40% height loss and 2-4 mm retropulsion of superior endplate into the spinal canal. 20-30% height loss of L1 vertebral body which was not present on prior examination. Finding is likely suggestive of acute fracture however no discrete fracture line is visualized. For further evaluation lumbar spine MRI can be obtained. CT of the chest abdomen and pelvis: No acute traumatic injury within the chest abdomen and pelvis. Changes related to prior anterior abdominal wall surgery. Significant distention of urinary bladder extending into the abdominal cavity. Diverticulosis with no evidence of diverticulitis. CT LUMBAR SPINE TRAUMA RECONSTRUCTION    Result Date: 12/10/2022  Thoracic CT: No acute osseous abnormality of thoracic spine. No fracture.  Lumbar CT: Unchanged chronic fracture deformity of superior endplate of L3 and L4 and L5 with with 20-40% height loss and 2-4 mm retropulsion of superior endplate into the spinal canal. 20-30% height loss of L1 vertebral body which was not present on prior examination. Finding is likely suggestive of acute fracture however no discrete fracture line is visualized. For further evaluation lumbar spine MRI can be obtained. CT of the chest abdomen and pelvis: No acute traumatic injury within the chest abdomen and pelvis. Changes related to prior anterior abdominal wall surgery. Significant distention of urinary bladder extending into the abdominal cavity. Diverticulosis with no evidence of diverticulitis. CT THORACIC SPINE TRAUMA RECONSTRUCTION    Result Date: 12/10/2022  Thoracic CT: No acute osseous abnormality of thoracic spine. No fracture. Lumbar CT: Unchanged chronic fracture deformity of superior endplate of L3 and L4 and L5 with with 20-40% height loss and 2-4 mm retropulsion of superior endplate into the spinal canal. 20-30% height loss of L1 vertebral body which was not present on prior examination. Finding is likely suggestive of acute fracture however no discrete fracture line is visualized. For further evaluation lumbar spine MRI can be obtained. CT of the chest abdomen and pelvis: No acute traumatic injury within the chest abdomen and pelvis. Changes related to prior anterior abdominal wall surgery. Significant distention of urinary bladder extending into the abdominal cavity. Diverticulosis with no evidence of diverticulitis.            Yash Crow DO  12/15/2022, 7:32 AM

## 2023-04-25 NOTE — ED PROVIDER NOTES
[id number] [facility]  [address] [facility] [address] [facility] [address]  Phone: [phone]  Fax: [phone]      eMERGENCY dEPARTMENT eNCOUnter      Pt Name: Cuco Norwood  MRN: [id number]  [place] 7/30/1932  Date of evaluation: 5/1/21      CHIEF COMPLAINT:  Chief Complaint   Patient presents with    Hand Injury     right hand smashed in car door, bruising to right hand, occured around 2130 last night       Livier Kayla Thurston is a 80 y.o. female who presents with evaluation for orthopedic pain:    Location/Symptom:   Right hand injury/bruising  Timing/Onset:  5 hrs ago  Context/Setting:    Hand/fingers caught in door accidentally. Increasing bruising/swelilng, presently on Coumadin. No other injuries. No paresthesias/focal weakness. Quality:   Achy, sharp  Duration:   constant  Modifying Factors: Worse with movement and weightbearing, better with rest  Severity:   Mild-moderate    Nursing Notes were reviewed. REVIEW OF SYSTEMS       Constitutional: Denies recent fever, chills. Eyes: No vision changes. Neck: No neck pain. Respiratory: Denies recent shortness of breath. Cardiac:  Denies recent chest pain. GI:  Denies abdominal pain/nausea/vomiting/diarrhea. : Denies dysuria. Musculoskeletal:   Per HPI  Neurologic:  No headache. No focal weakness. No paresthesias. Skin:  Denies any rash. Negative in 10 essential Systems except as mentioned above and in the HPI. PAST MEDICAL HISTORY   PMH:  has a past medical history of Atrial fibrillation, Back pain, chronic, GERD (gastroesophageal reflux disease), Hiatal hernia, History of breast cancer, History of glaucoma, Hypertension, benign essential, goal below 140/90, and Hypothyroid. Surgical History:  has a past surgical history that includes Cholecystectomy; Breast surgery; Cataract removal with implant; and bladder repair. Social History:  reports that she has never smoked.  She has never used smokeless disposition diagnosis with the patient and or their family/guardian. I have answered their questions and given discharge instructions. They voiced understanding of these instructions and did not have any further questions or complaints. Orders Placed This Encounter   Medications    traMADol (ULTRAM) tablet 50 mg       CONSULTS:  None      FINAL IMPRESSION      1. Closed displaced fracture of middle phalanx of right middle finger, initial encounter    2. Closed nondisplaced fracture of middle phalanx of right ring finger, initial encounter          DISPOSITION/PLAN:  DISPOSITION Decision To Discharge 05/01/2021 08:02:06 PM        PATIENT REFERRED TO:  Ana Laura Saha MD  [address] [address]  [phone]    Schedule an appointment as soon as possible for a visit   For fracture management      DISCHARGE MEDICATIONS:  Discharge Medication List as of 5/1/2021  8:15 PM          (Please note that portions of this note were completed with a voice recognition program.  Efforts were made to edit the dictations but occasionally words are mis-transcribed.)    JUVE Benoit PA-C  05/01/21 2020 Other

## (undated) DEVICE — KIT CATH 7FR L16CM CTRL VEN POLYUR 3 LUMN PRSS INJ BLU

## (undated) DEVICE — SUTURE PROL SZ 1 L60IN NONABSORBABLE BLU L65MM TP-1 3/8 CIR 8824G

## (undated) DEVICE — GAUZE,SPONGE,FLUFF,6"X6.75",STRL,5/TRAY: Brand: MEDLINE

## (undated) DEVICE — GAUZE,SPONGE,4"X4",12PLY,STERILE,LF,2'S: Brand: MEDLINE

## (undated) DEVICE — SPONGE LAP W18XL18IN WHT COT 4 PLY FLD STRUNG RADPQ DISP ST

## (undated) DEVICE — SUTURE PDS II SZ 0 L60IN ABSRB VLT L65MM TP-1 1/2 CIR Z991G

## (undated) DEVICE — SUTURE VCRL SZ 3-0 L27IN ABSRB UD L26MM SH 1/2 CIR J416H

## (undated) DEVICE — TRAY URIN CATH 16FR DRNGE BG STATLOK STBL DEV F SURSTP

## (undated) DEVICE — RELOAD STPL L55MM OPN H3.8MM CLS H1.5MM WIRE DIA0.2MM REG

## (undated) DEVICE — APPLICATOR MEDICATED 26 CC SOLUTION HI LT ORNG CHLORAPREP

## (undated) DEVICE — BLANKET WRM W29.9XL79.1IN UP BODY FORC AIR MISTRAL-AIR

## (undated) DEVICE — Device

## (undated) DEVICE — SUTURE CHROMIC GUT SZ 3-0 L27IN ABSRB BRN L26MM SH 1/2 CIR G122H

## (undated) DEVICE — SUTURE PERMAHAND SZ 3-0 L18IN NONABSORBABLE BLK L26MM SH C013D

## (undated) DEVICE — TOWEL,OR,DSP,ST,BLUE,STD,4/PK,20PK/CS: Brand: MEDLINE

## (undated) DEVICE — GLOVE ORANGE PI 7   MSG9070

## (undated) DEVICE — PAD,ABDOMINAL,5"X9",ST,LF,25/BX: Brand: MEDLINE INDUSTRIES, INC.

## (undated) DEVICE — GOWN,AURORA,NONRNF,XL,30/CS: Brand: MEDLINE

## (undated) DEVICE — KIT ART LN 20GA L12CM FEP RADPQ 0.025X13.75IN SPR GWIRE

## (undated) DEVICE — MHPB GEN MINOR PACK: Brand: MEDLINE INDUSTRIES, INC.

## (undated) DEVICE — STAPLER INT L55MM CUT LN L53MM STPL LN L57MM BLU B FRM 8

## (undated) DEVICE — TUBING, SUCTION, 3/16" X 20', STRAIGHT: Brand: MEDLINE

## (undated) DEVICE — STRAP,POSITIONING,KNEE/BODY,FOAM,4X60": Brand: MEDLINE

## (undated) DEVICE — SEALER REPROC LAPSCP DIV L18CM OPN TISS LIGASURE RENEWAL

## (undated) DEVICE — PROTECTOR ULN NRV PUR FOAM HK LOOP STRP ANATOMICALLY

## (undated) DEVICE — BLANKET WRM W40.2XL55.9IN IORT LO BODY + MISTRAL AIR

## (undated) DEVICE — DISPOSABLE EQUIPMENT COVER: Brand: SLUSH DRAPE

## (undated) DEVICE — SOLUTION IV IRRIG POUR BRL 0.9% SODIUM CHL 2F7124